# Patient Record
Sex: MALE | Race: BLACK OR AFRICAN AMERICAN | NOT HISPANIC OR LATINO
[De-identification: names, ages, dates, MRNs, and addresses within clinical notes are randomized per-mention and may not be internally consistent; named-entity substitution may affect disease eponyms.]

---

## 2017-01-25 PROBLEM — Z00.00 ENCOUNTER FOR PREVENTIVE HEALTH EXAMINATION: Status: ACTIVE | Noted: 2017-01-25

## 2017-03-23 ENCOUNTER — APPOINTMENT (OUTPATIENT)
Dept: OPHTHALMOLOGY | Facility: CLINIC | Age: 77
End: 2017-03-23

## 2017-05-16 ENCOUNTER — INPATIENT (INPATIENT)
Facility: HOSPITAL | Age: 77
LOS: 1 days | Discharge: HOME HEALTH | End: 2017-05-18
Attending: INTERNAL MEDICINE | Admitting: INTERNAL MEDICINE

## 2017-05-16 VITALS
RESPIRATION RATE: 16 BRPM | DIASTOLIC BLOOD PRESSURE: 96 MMHG | OXYGEN SATURATION: 100 % | SYSTOLIC BLOOD PRESSURE: 165 MMHG | TEMPERATURE: 98 F | HEART RATE: 80 BPM

## 2017-05-16 DIAGNOSIS — I50.9 HEART FAILURE, UNSPECIFIED: ICD-10-CM

## 2017-05-16 DIAGNOSIS — Z98.890 OTHER SPECIFIED POSTPROCEDURAL STATES: Chronic | ICD-10-CM

## 2017-05-16 DIAGNOSIS — I25.10 ATHEROSCLEROTIC HEART DISEASE OF NATIVE CORONARY ARTERY WITHOUT ANGINA PECTORIS: ICD-10-CM

## 2017-05-16 DIAGNOSIS — E78.5 HYPERLIPIDEMIA, UNSPECIFIED: ICD-10-CM

## 2017-05-16 DIAGNOSIS — E11.9 TYPE 2 DIABETES MELLITUS WITHOUT COMPLICATIONS: ICD-10-CM

## 2017-05-16 DIAGNOSIS — I48.0 PAROXYSMAL ATRIAL FIBRILLATION: ICD-10-CM

## 2017-05-16 DIAGNOSIS — Z29.9 ENCOUNTER FOR PROPHYLACTIC MEASURES, UNSPECIFIED: ICD-10-CM

## 2017-05-16 DIAGNOSIS — I10 ESSENTIAL (PRIMARY) HYPERTENSION: ICD-10-CM

## 2017-05-16 DIAGNOSIS — D50.9 IRON DEFICIENCY ANEMIA, UNSPECIFIED: ICD-10-CM

## 2017-05-16 DIAGNOSIS — Z95.5 PRESENCE OF CORONARY ANGIOPLASTY IMPLANT AND GRAFT: Chronic | ICD-10-CM

## 2017-05-16 DIAGNOSIS — I50.43 ACUTE ON CHRONIC COMBINED SYSTOLIC (CONGESTIVE) AND DIASTOLIC (CONGESTIVE) HEART FAILURE: ICD-10-CM

## 2017-05-16 LAB
ALBUMIN SERPL ELPH-MCNC: 3.6 G/DL — SIGNIFICANT CHANGE UP (ref 3.3–5)
ALP SERPL-CCNC: 53 U/L — SIGNIFICANT CHANGE UP (ref 40–120)
ALT FLD-CCNC: 21 U/L — SIGNIFICANT CHANGE UP (ref 4–41)
APTT BLD: 56.4 SEC — HIGH (ref 27.5–37.4)
AST SERPL-CCNC: 53 U/L — HIGH (ref 4–40)
BASOPHILS # BLD AUTO: 0.01 K/UL — SIGNIFICANT CHANGE UP (ref 0–0.2)
BASOPHILS NFR BLD AUTO: 0.1 % — SIGNIFICANT CHANGE UP (ref 0–2)
BILIRUB SERPL-MCNC: 0.6 MG/DL — SIGNIFICANT CHANGE UP (ref 0.2–1.2)
BUN SERPL-MCNC: 8 MG/DL — SIGNIFICANT CHANGE UP (ref 7–23)
CALCIUM SERPL-MCNC: 8 MG/DL — LOW (ref 8.4–10.5)
CHLORIDE SERPL-SCNC: 105 MMOL/L — SIGNIFICANT CHANGE UP (ref 98–107)
CK MB BLD-MCNC: 2.63 NG/ML — SIGNIFICANT CHANGE UP (ref 1–6.6)
CK MB BLD-MCNC: 2.76 NG/ML — SIGNIFICANT CHANGE UP (ref 1–6.6)
CK MB BLD-MCNC: SIGNIFICANT CHANGE UP (ref 0–2.5)
CK SERPL-CCNC: 109 U/L — SIGNIFICANT CHANGE UP (ref 30–200)
CK SERPL-CCNC: 74 U/L — SIGNIFICANT CHANGE UP (ref 30–200)
CO2 SERPL-SCNC: 22 MMOL/L — SIGNIFICANT CHANGE UP (ref 22–31)
CREAT SERPL-MCNC: 0.96 MG/DL — SIGNIFICANT CHANGE UP (ref 0.5–1.3)
EOSINOPHIL # BLD AUTO: 0.11 K/UL — SIGNIFICANT CHANGE UP (ref 0–0.5)
EOSINOPHIL NFR BLD AUTO: 1.1 % — SIGNIFICANT CHANGE UP (ref 0–6)
GLUCOSE SERPL-MCNC: 106 MG/DL — HIGH (ref 70–99)
HCT VFR BLD CALC: 37.2 % — LOW (ref 39–50)
HGB BLD-MCNC: 11.6 G/DL — LOW (ref 13–17)
IMM GRANULOCYTES NFR BLD AUTO: 0.2 % — SIGNIFICANT CHANGE UP (ref 0–1.5)
INR BLD: 3.4 — HIGH (ref 0.88–1.17)
LYMPHOCYTES # BLD AUTO: 4.28 K/UL — HIGH (ref 1–3.3)
LYMPHOCYTES # BLD AUTO: 43.9 % — SIGNIFICANT CHANGE UP (ref 13–44)
MCHC RBC-ENTMCNC: 25.3 PG — LOW (ref 27–34)
MCHC RBC-ENTMCNC: 31.2 % — LOW (ref 32–36)
MCV RBC AUTO: 81 FL — SIGNIFICANT CHANGE UP (ref 80–100)
MONOCYTES # BLD AUTO: 0.6 K/UL — SIGNIFICANT CHANGE UP (ref 0–0.9)
MONOCYTES NFR BLD AUTO: 6.2 % — SIGNIFICANT CHANGE UP (ref 2–14)
NEUTROPHILS # BLD AUTO: 4.72 K/UL — SIGNIFICANT CHANGE UP (ref 1.8–7.4)
NEUTROPHILS NFR BLD AUTO: 48.5 % — SIGNIFICANT CHANGE UP (ref 43–77)
NT-PROBNP SERPL-SCNC: 3210 PG/ML — SIGNIFICANT CHANGE UP
PLATELET # BLD AUTO: 325 K/UL — SIGNIFICANT CHANGE UP (ref 150–400)
PMV BLD: 10.9 FL — SIGNIFICANT CHANGE UP (ref 7–13)
POTASSIUM SERPL-MCNC: 5.4 MMOL/L — HIGH (ref 3.5–5.3)
POTASSIUM SERPL-SCNC: 5.4 MMOL/L — HIGH (ref 3.5–5.3)
PROT SERPL-MCNC: 7.7 G/DL — SIGNIFICANT CHANGE UP (ref 6–8.3)
PROTHROM AB SERPL-ACNC: 39 SEC — HIGH (ref 9.8–13.1)
RBC # BLD: 4.59 M/UL — SIGNIFICANT CHANGE UP (ref 4.2–5.8)
RBC # FLD: 16.8 % — HIGH (ref 10.3–14.5)
SODIUM SERPL-SCNC: 141 MMOL/L — SIGNIFICANT CHANGE UP (ref 135–145)
TROPONIN T SERPL-MCNC: < 0.06 NG/ML — SIGNIFICANT CHANGE UP (ref 0–0.06)
TROPONIN T SERPL-MCNC: < 0.06 NG/ML — SIGNIFICANT CHANGE UP (ref 0–0.06)
WBC # BLD: 9.74 K/UL — SIGNIFICANT CHANGE UP (ref 3.8–10.5)
WBC # FLD AUTO: 9.74 K/UL — SIGNIFICANT CHANGE UP (ref 3.8–10.5)

## 2017-05-16 RX ORDER — FUROSEMIDE 40 MG
40 TABLET ORAL ONCE
Qty: 0 | Refills: 0 | Status: COMPLETED | OUTPATIENT
Start: 2017-05-16 | End: 2017-05-16

## 2017-05-16 RX ORDER — LORATADINE 10 MG/1
10 TABLET ORAL DAILY
Qty: 0 | Refills: 0 | Status: DISCONTINUED | OUTPATIENT
Start: 2017-05-16 | End: 2017-05-18

## 2017-05-16 RX ORDER — CARVEDILOL PHOSPHATE 80 MG/1
6.25 CAPSULE, EXTENDED RELEASE ORAL EVERY 12 HOURS
Qty: 0 | Refills: 0 | Status: DISCONTINUED | OUTPATIENT
Start: 2017-05-16 | End: 2017-05-18

## 2017-05-16 RX ORDER — CLOPIDOGREL BISULFATE 75 MG/1
75 TABLET, FILM COATED ORAL DAILY
Qty: 0 | Refills: 0 | Status: DISCONTINUED | OUTPATIENT
Start: 2017-05-16 | End: 2017-05-18

## 2017-05-16 RX ORDER — GLUCAGON INJECTION, SOLUTION 0.5 MG/.1ML
1 INJECTION, SOLUTION SUBCUTANEOUS ONCE
Qty: 0 | Refills: 0 | Status: DISCONTINUED | OUTPATIENT
Start: 2017-05-16 | End: 2017-05-18

## 2017-05-16 RX ORDER — DEXTROSE 50 % IN WATER 50 %
25 SYRINGE (ML) INTRAVENOUS ONCE
Qty: 0 | Refills: 0 | Status: DISCONTINUED | OUTPATIENT
Start: 2017-05-16 | End: 2017-05-18

## 2017-05-16 RX ORDER — FERROUS SULFATE 325(65) MG
325 TABLET ORAL DAILY
Qty: 0 | Refills: 0 | Status: DISCONTINUED | OUTPATIENT
Start: 2017-05-16 | End: 2017-05-18

## 2017-05-16 RX ORDER — INSULIN LISPRO 100/ML
VIAL (ML) SUBCUTANEOUS
Qty: 0 | Refills: 0 | Status: DISCONTINUED | OUTPATIENT
Start: 2017-05-16 | End: 2017-05-18

## 2017-05-16 RX ORDER — CARVEDILOL PHOSPHATE 80 MG/1
6.25 CAPSULE, EXTENDED RELEASE ORAL ONCE
Qty: 0 | Refills: 0 | Status: COMPLETED | OUTPATIENT
Start: 2017-05-16 | End: 2017-05-16

## 2017-05-16 RX ORDER — DEXTROSE 50 % IN WATER 50 %
1 SYRINGE (ML) INTRAVENOUS ONCE
Qty: 0 | Refills: 0 | Status: DISCONTINUED | OUTPATIENT
Start: 2017-05-16 | End: 2017-05-18

## 2017-05-16 RX ORDER — ATORVASTATIN CALCIUM 80 MG/1
80 TABLET, FILM COATED ORAL AT BEDTIME
Qty: 0 | Refills: 0 | Status: DISCONTINUED | OUTPATIENT
Start: 2017-05-16 | End: 2017-05-18

## 2017-05-16 RX ORDER — SODIUM CHLORIDE 9 MG/ML
1000 INJECTION, SOLUTION INTRAVENOUS
Qty: 0 | Refills: 0 | Status: DISCONTINUED | OUTPATIENT
Start: 2017-05-16 | End: 2017-05-18

## 2017-05-16 RX ORDER — DEXTROSE 50 % IN WATER 50 %
12.5 SYRINGE (ML) INTRAVENOUS ONCE
Qty: 0 | Refills: 0 | Status: DISCONTINUED | OUTPATIENT
Start: 2017-05-16 | End: 2017-05-18

## 2017-05-16 RX ORDER — LISINOPRIL 2.5 MG/1
20 TABLET ORAL DAILY
Qty: 0 | Refills: 0 | Status: DISCONTINUED | OUTPATIENT
Start: 2017-05-16 | End: 2017-05-17

## 2017-05-16 RX ORDER — INSULIN LISPRO 100/ML
VIAL (ML) SUBCUTANEOUS AT BEDTIME
Qty: 0 | Refills: 0 | Status: DISCONTINUED | OUTPATIENT
Start: 2017-05-16 | End: 2017-05-18

## 2017-05-16 RX ORDER — ASPIRIN/CALCIUM CARB/MAGNESIUM 324 MG
81 TABLET ORAL DAILY
Qty: 0 | Refills: 0 | Status: DISCONTINUED | OUTPATIENT
Start: 2017-05-16 | End: 2017-05-18

## 2017-05-16 RX ORDER — FUROSEMIDE 40 MG
40 TABLET ORAL EVERY 12 HOURS
Qty: 0 | Refills: 0 | Status: DISCONTINUED | OUTPATIENT
Start: 2017-05-16 | End: 2017-05-18

## 2017-05-16 RX ADMIN — CARVEDILOL PHOSPHATE 6.25 MILLIGRAM(S): 80 CAPSULE, EXTENDED RELEASE ORAL at 21:33

## 2017-05-16 RX ADMIN — Medication 81 MILLIGRAM(S): at 18:18

## 2017-05-16 RX ADMIN — CLOPIDOGREL BISULFATE 75 MILLIGRAM(S): 75 TABLET, FILM COATED ORAL at 18:18

## 2017-05-16 RX ADMIN — Medication 40 MILLIGRAM(S): at 18:18

## 2017-05-16 RX ADMIN — ATORVASTATIN CALCIUM 80 MILLIGRAM(S): 80 TABLET, FILM COATED ORAL at 21:33

## 2017-05-16 RX ADMIN — Medication 325 MILLIGRAM(S): at 18:18

## 2017-05-16 RX ADMIN — CARVEDILOL PHOSPHATE 6.25 MILLIGRAM(S): 80 CAPSULE, EXTENDED RELEASE ORAL at 06:39

## 2017-05-16 RX ADMIN — Medication 40 MILLIGRAM(S): at 06:08

## 2017-05-16 RX ADMIN — LORATADINE 10 MILLIGRAM(S): 10 TABLET ORAL at 18:18

## 2017-05-16 RX ADMIN — LISINOPRIL 20 MILLIGRAM(S): 2.5 TABLET ORAL at 18:18

## 2017-05-16 NOTE — ED PROVIDER NOTE - ATTENDING CONTRIBUTION TO CARE
I was physically present for the E/M service provided. I agree with above history, physical, and plan which I have reviewed and edited where appropriate. I was physically present for the key portions of the service provided.    -NAD, comfortable respirations on NC, no peripheral edema, CXR report pulmonary edema  -No acute ischemia on EKG, troponin WNL x1  -Will need admission for diuresis, Lasix given in ED

## 2017-05-16 NOTE — H&P ADULT. - NEGATIVE GASTROINTESTINAL SYMPTOMS
no vomiting/no flatulence/no diarrhea/no constipation/no change in bowel habits/no abdominal pain/no melena/no hematochezia/no nausea

## 2017-05-16 NOTE — ED ADULT TRIAGE NOTE - CHIEF COMPLAINT QUOTE
Pt arrives w/ c/o shortness of breath since yesterday worsening since last night. Denies chest pain, nausea or vomiting. Pt arrives w/ c/o shortness of breath since yesterday worsening since last night. Denies chest pain, nausea or vomiting. EKG evaluated by attending and abnormal.

## 2017-05-16 NOTE — H&P ADULT. - ATTENDING COMMENTS
Pt seen and examined at bedside. Agree with assessment and plan as above  Admitted with acute on chronic systolic heart failure  IV diuresis  Tele   Trend CE/Trops  Home meds

## 2017-05-16 NOTE — ED PROVIDER NOTE - PROGRESS NOTE DETAILS
Pt stable. VSS. Labs, imaging reviewed. Pt's cardiologist does not come here, will admit to tele doc. Dr. Young and Tele PA aware.  Robert Finley MD PGY-3

## 2017-05-16 NOTE — H&P ADULT. - PROBLEM SELECTOR PLAN 1
Admit to telemetry, serial cardiac enzymes, serial EKGs  Check CBC, CMP, TSH, FLP, HgA1C, BNP  Continue Coreg, Lisinopril  Start Lasix 40mg IVP BID  Strict I&Os, monitor daily weights, 1500 cc fluid restriction, sodium restriction  Echocardiogram ordered  F/U MD note

## 2017-05-16 NOTE — H&P ADULT. - RS GEN PE MLT RESP DETAILS PC
good air movement/rales/no rhonchi/no intercostal retractions/airway patent/no wheezes/respirations non-labored

## 2017-05-16 NOTE — H&P ADULT. - PMH
CAD (coronary artery disease)    CHF (congestive heart failure)    Chronic diastolic congestive heart failure, NYHA class 3    DM (diabetes mellitus)    HLD (hyperlipidemia)    HTN (hypertension)    KIMMY (iron deficiency anemia)    PAF (paroxysmal atrial fibrillation)

## 2017-05-16 NOTE — H&P ADULT. - OTHER
Echo from April 2017 ([facility]): EF 45%. LVH. Mild decreased LV systolic function. Grade III (severe ) diastolic dysfunction. Moderate MR. Moderate pulm HTN.

## 2017-05-16 NOTE — ED PROVIDER NOTE - PMH
CAD (coronary artery disease)    CHF (congestive heart failure)    Diabetes    HLD (hyperlipidemia)    HTN (hypertension)

## 2017-05-16 NOTE — ED PROVIDER NOTE - OBJECTIVE STATEMENT
75yo male h/o DM, asthma, Afib on coumadin, CAD s/p PCI in 4/2017 [facility] on asa and plavix, CHF EF 45% on lasix, p/w increasing SOB since last night. Pt states he ran out of lasix 3 days ago and missed 3 days doses. + cough with blood tinged sputum, No chest pain, no fevers, no LE edema, no leg pain. no h/o dvt/pe  SOB worse with exertion and talking, cannot complete sentence w/o SOB  meds: asa, plavix, warfarin, lasix, coreg, lisinopril, amiodarone, 77yo male h/o DM, asthma, Afib on coumadin, CAD s/p PCI in 4/2017 [facility] on asa and plavix, CHF EF 45% on lasix, p/w increasing SOB since last night. Pt states he ran out of lasix 3 days ago and missed 3 days doses. Called PCP for refill but reports pharmacy did not receive it. + cough with blood tinged sputum, No chest pain, no fevers, no LE edema, no leg pain. no h/o dvt/pe  SOB worse with exertion and talking, cannot complete sentence w/o SOB  meds: asa, plavix, warfarin, lasix, coreg, lisinopril, amiodarone,

## 2017-05-16 NOTE — H&P ADULT. - NEGATIVE OPHTHALMOLOGIC SYMPTOMS
no loss of vision L/no blurred vision L/no blurred vision R/no diplopia/no pain L/no photophobia/no pain R/no loss of vision R

## 2017-05-16 NOTE — H&P ADULT. - PROBLEM SELECTOR PLAN 3
Monitor on telemetry, serial cardiac enzymes, serial EKGs  No active signs of ischemia  Continue ASA, Plavix, Lipitor, Lisinopril, Coreg  DASH diet

## 2017-05-16 NOTE — H&P ADULT. - ASSESSMENT
75 y/o male with a PMHx of CAD S/P stent placement to mRCA and OM1 (at [facility] in April 2017), ischemic cardiomyopathy with mild LV dysfunction (EF 45%), grade III diastolic dysfunction, HTN, HLD, DM, and KIMMY presents to ED with dyspnea on exertion, orthopnea and weight gain secondary to acute on chronic combined systolic and diastolic heart failure in the setting of missed Lasix doses.

## 2017-05-16 NOTE — H&P ADULT. - NEUROLOGICAL DETAILS
responds to verbal commands/normal strength/alert and oriented x 3/responds to pain/sensation intact/cranial nerves intact

## 2017-05-16 NOTE — H&P ADULT. - NEGATIVE NEUROLOGICAL SYMPTOMS
no vertigo/no focal seizures/no transient paralysis/no weakness/no difficulty walking/no paresthesias/no confusion/no loss of consciousness/no hemiparesis/no tremors/no loss of sensation/no syncope/no generalized seizures/no headache

## 2017-05-16 NOTE — ED ADULT NURSE NOTE - CHIEF COMPLAINT QUOTE
Pt arrives w/ c/o shortness of breath since yesterday worsening since last night. Denies chest pain, nausea or vomiting. EKG evaluated by attending and abnormal.

## 2017-05-16 NOTE — ED ADULT NURSE NOTE - OBJECTIVE STATEMENT
Pt received in #15, aaox3 with c/o sob and cough. Pt states that he missed his doses of lasix for the past 3 days, "I went to the pharmacy for my medicine that my doctor said he sent but they said they didn't get it". Pt denies cp, nausea, vomiting, dizziness or diaphoresis. Pt on CM in SR, IV established, labs sent.

## 2017-05-17 LAB
BUN SERPL-MCNC: 10 MG/DL — SIGNIFICANT CHANGE UP (ref 7–23)
CALCIUM SERPL-MCNC: 9.4 MG/DL — SIGNIFICANT CHANGE UP (ref 8.4–10.5)
CHLORIDE SERPL-SCNC: 104 MMOL/L — SIGNIFICANT CHANGE UP (ref 98–107)
CHOLEST SERPL-MCNC: 118 MG/DL — LOW (ref 120–199)
CO2 SERPL-SCNC: 26 MMOL/L — SIGNIFICANT CHANGE UP (ref 22–31)
CREAT SERPL-MCNC: 0.92 MG/DL — SIGNIFICANT CHANGE UP (ref 0.5–1.3)
GLUCOSE SERPL-MCNC: 109 MG/DL — HIGH (ref 70–99)
HBA1C BLD-MCNC: 6.2 % — HIGH (ref 4–5.6)
HCT VFR BLD CALC: 37.8 % — LOW (ref 39–50)
HDLC SERPL-MCNC: 37 MG/DL — SIGNIFICANT CHANGE UP (ref 35–55)
HGB BLD-MCNC: 11.8 G/DL — LOW (ref 13–17)
INR BLD: 2.93 — HIGH (ref 0.88–1.17)
LIPID PNL WITH DIRECT LDL SERPL: 70 MG/DL — SIGNIFICANT CHANGE UP
MAGNESIUM SERPL-MCNC: 1.8 MG/DL — SIGNIFICANT CHANGE UP (ref 1.6–2.6)
MCHC RBC-ENTMCNC: 25.1 PG — LOW (ref 27–34)
MCHC RBC-ENTMCNC: 31.2 % — LOW (ref 32–36)
MCV RBC AUTO: 80.4 FL — SIGNIFICANT CHANGE UP (ref 80–100)
PLATELET # BLD AUTO: 300 K/UL — SIGNIFICANT CHANGE UP (ref 150–400)
PMV BLD: 10.4 FL — SIGNIFICANT CHANGE UP (ref 7–13)
POTASSIUM SERPL-MCNC: 3.5 MMOL/L — SIGNIFICANT CHANGE UP (ref 3.5–5.3)
POTASSIUM SERPL-SCNC: 3.5 MMOL/L — SIGNIFICANT CHANGE UP (ref 3.5–5.3)
PROTHROM AB SERPL-ACNC: 33.6 SEC — HIGH (ref 9.8–13.1)
RBC # BLD: 4.7 M/UL — SIGNIFICANT CHANGE UP (ref 4.2–5.8)
RBC # FLD: 16.6 % — HIGH (ref 10.3–14.5)
SODIUM SERPL-SCNC: 146 MMOL/L — HIGH (ref 135–145)
TRIGL SERPL-MCNC: 68 MG/DL — SIGNIFICANT CHANGE UP (ref 10–149)
TSH SERPL-MCNC: 1.25 UIU/ML — SIGNIFICANT CHANGE UP (ref 0.27–4.2)
WBC # BLD: 8.51 K/UL — SIGNIFICANT CHANGE UP (ref 3.8–10.5)
WBC # FLD AUTO: 8.51 K/UL — SIGNIFICANT CHANGE UP (ref 3.8–10.5)

## 2017-05-17 RX ORDER — LISINOPRIL 2.5 MG/1
40 TABLET ORAL DAILY
Qty: 0 | Refills: 0 | Status: DISCONTINUED | OUTPATIENT
Start: 2017-05-17 | End: 2017-05-18

## 2017-05-17 RX ORDER — POTASSIUM CHLORIDE 20 MEQ
20 PACKET (EA) ORAL ONCE
Qty: 0 | Refills: 0 | Status: COMPLETED | OUTPATIENT
Start: 2017-05-17 | End: 2017-05-17

## 2017-05-17 RX ORDER — LISINOPRIL 2.5 MG/1
40 TABLET ORAL DAILY
Qty: 0 | Refills: 0 | Status: DISCONTINUED | OUTPATIENT
Start: 2017-05-17 | End: 2017-05-17

## 2017-05-17 RX ORDER — WARFARIN SODIUM 2.5 MG/1
3 TABLET ORAL ONCE
Qty: 0 | Refills: 0 | Status: COMPLETED | OUTPATIENT
Start: 2017-05-17 | End: 2017-05-17

## 2017-05-17 RX ADMIN — Medication 40 MILLIGRAM(S): at 05:43

## 2017-05-17 RX ADMIN — Medication 20 MILLIEQUIVALENT(S): at 12:08

## 2017-05-17 RX ADMIN — LISINOPRIL 20 MILLIGRAM(S): 2.5 TABLET ORAL at 05:43

## 2017-05-17 RX ADMIN — Medication 40 MILLIGRAM(S): at 17:34

## 2017-05-17 RX ADMIN — CLOPIDOGREL BISULFATE 75 MILLIGRAM(S): 75 TABLET, FILM COATED ORAL at 13:56

## 2017-05-17 RX ADMIN — Medication 325 MILLIGRAM(S): at 12:08

## 2017-05-17 RX ADMIN — CARVEDILOL PHOSPHATE 6.25 MILLIGRAM(S): 80 CAPSULE, EXTENDED RELEASE ORAL at 17:34

## 2017-05-17 RX ADMIN — CARVEDILOL PHOSPHATE 6.25 MILLIGRAM(S): 80 CAPSULE, EXTENDED RELEASE ORAL at 05:43

## 2017-05-17 RX ADMIN — ATORVASTATIN CALCIUM 80 MILLIGRAM(S): 80 TABLET, FILM COATED ORAL at 21:32

## 2017-05-17 RX ADMIN — WARFARIN SODIUM 3 MILLIGRAM(S): 2.5 TABLET ORAL at 17:34

## 2017-05-17 RX ADMIN — LISINOPRIL 40 MILLIGRAM(S): 2.5 TABLET ORAL at 13:56

## 2017-05-17 RX ADMIN — LORATADINE 10 MILLIGRAM(S): 10 TABLET ORAL at 12:08

## 2017-05-17 RX ADMIN — Medication 81 MILLIGRAM(S): at 12:08

## 2017-05-18 VITALS
DIASTOLIC BLOOD PRESSURE: 94 MMHG | RESPIRATION RATE: 18 BRPM | TEMPERATURE: 98 F | OXYGEN SATURATION: 100 % | SYSTOLIC BLOOD PRESSURE: 133 MMHG | HEART RATE: 74 BPM

## 2017-05-18 LAB
BASOPHILS # BLD AUTO: 0.01 K/UL — SIGNIFICANT CHANGE UP (ref 0–0.2)
BASOPHILS NFR BLD AUTO: 0.1 % — SIGNIFICANT CHANGE UP (ref 0–2)
BUN SERPL-MCNC: 14 MG/DL — SIGNIFICANT CHANGE UP (ref 7–23)
CALCIUM SERPL-MCNC: 9.3 MG/DL — SIGNIFICANT CHANGE UP (ref 8.4–10.5)
CHLORIDE SERPL-SCNC: 104 MMOL/L — SIGNIFICANT CHANGE UP (ref 98–107)
CO2 SERPL-SCNC: 27 MMOL/L — SIGNIFICANT CHANGE UP (ref 22–31)
CREAT SERPL-MCNC: 0.93 MG/DL — SIGNIFICANT CHANGE UP (ref 0.5–1.3)
EOSINOPHIL # BLD AUTO: 0.11 K/UL — SIGNIFICANT CHANGE UP (ref 0–0.5)
EOSINOPHIL NFR BLD AUTO: 1.5 % — SIGNIFICANT CHANGE UP (ref 0–6)
GLUCOSE SERPL-MCNC: 105 MG/DL — HIGH (ref 70–99)
HCT VFR BLD CALC: 38.3 % — LOW (ref 39–50)
HGB BLD-MCNC: 11.9 G/DL — LOW (ref 13–17)
IMM GRANULOCYTES NFR BLD AUTO: 0.1 % — SIGNIFICANT CHANGE UP (ref 0–1.5)
INR BLD: 2.04 — HIGH (ref 0.88–1.17)
LYMPHOCYTES # BLD AUTO: 3.16 K/UL — SIGNIFICANT CHANGE UP (ref 1–3.3)
LYMPHOCYTES # BLD AUTO: 42.2 % — SIGNIFICANT CHANGE UP (ref 13–44)
MAGNESIUM SERPL-MCNC: 1.7 MG/DL — SIGNIFICANT CHANGE UP (ref 1.6–2.6)
MCHC RBC-ENTMCNC: 24.9 PG — LOW (ref 27–34)
MCHC RBC-ENTMCNC: 31.1 % — LOW (ref 32–36)
MCV RBC AUTO: 80.3 FL — SIGNIFICANT CHANGE UP (ref 80–100)
MONOCYTES # BLD AUTO: 0.7 K/UL — SIGNIFICANT CHANGE UP (ref 0–0.9)
MONOCYTES NFR BLD AUTO: 9.3 % — SIGNIFICANT CHANGE UP (ref 2–14)
NEUTROPHILS # BLD AUTO: 3.5 K/UL — SIGNIFICANT CHANGE UP (ref 1.8–7.4)
NEUTROPHILS NFR BLD AUTO: 46.8 % — SIGNIFICANT CHANGE UP (ref 43–77)
PLATELET # BLD AUTO: 295 K/UL — SIGNIFICANT CHANGE UP (ref 150–400)
PMV BLD: 10.6 FL — SIGNIFICANT CHANGE UP (ref 7–13)
POTASSIUM SERPL-MCNC: 3.1 MMOL/L — LOW (ref 3.5–5.3)
POTASSIUM SERPL-SCNC: 3.1 MMOL/L — LOW (ref 3.5–5.3)
PROTHROM AB SERPL-ACNC: 23.2 SEC — HIGH (ref 9.8–13.1)
RBC # BLD: 4.77 M/UL — SIGNIFICANT CHANGE UP (ref 4.2–5.8)
RBC # FLD: 16.7 % — HIGH (ref 10.3–14.5)
SODIUM SERPL-SCNC: 143 MMOL/L — SIGNIFICANT CHANGE UP (ref 135–145)
WBC # BLD: 7.49 K/UL — SIGNIFICANT CHANGE UP (ref 3.8–10.5)
WBC # FLD AUTO: 7.49 K/UL — SIGNIFICANT CHANGE UP (ref 3.8–10.5)

## 2017-05-18 RX ORDER — CARVEDILOL PHOSPHATE 80 MG/1
1 CAPSULE, EXTENDED RELEASE ORAL
Qty: 60 | Refills: 0 | OUTPATIENT
Start: 2017-05-18

## 2017-05-18 RX ORDER — WARFARIN SODIUM 2.5 MG/1
1 TABLET ORAL
Qty: 0 | Refills: 0 | COMMUNITY

## 2017-05-18 RX ORDER — LORATADINE 10 MG/1
1 TABLET ORAL
Qty: 0 | Refills: 0 | COMMUNITY

## 2017-05-18 RX ORDER — ASPIRIN/CALCIUM CARB/MAGNESIUM 324 MG
1 TABLET ORAL
Qty: 0 | Refills: 0 | COMMUNITY

## 2017-05-18 RX ORDER — CLOPIDOGREL BISULFATE 75 MG/1
1 TABLET, FILM COATED ORAL
Qty: 30 | Refills: 0 | OUTPATIENT
Start: 2017-05-18

## 2017-05-18 RX ORDER — ATORVASTATIN CALCIUM 80 MG/1
1 TABLET, FILM COATED ORAL
Qty: 30 | Refills: 0
Start: 2017-05-18

## 2017-05-18 RX ORDER — WARFARIN SODIUM 2.5 MG/1
1 TABLET ORAL
Qty: 7 | Refills: 0 | OUTPATIENT
Start: 2017-05-18 | End: 2017-06-17

## 2017-05-18 RX ORDER — ASPIRIN/CALCIUM CARB/MAGNESIUM 324 MG
1 TABLET ORAL
Qty: 30 | Refills: 0 | OUTPATIENT
Start: 2017-05-18

## 2017-05-18 RX ORDER — LINAGLIPTIN 5 MG/1
1 TABLET, FILM COATED ORAL
Qty: 0 | Refills: 0 | COMMUNITY

## 2017-05-18 RX ORDER — FUROSEMIDE 40 MG
40 TABLET ORAL
Qty: 30 | Refills: 0 | OUTPATIENT
Start: 2017-05-18

## 2017-05-18 RX ORDER — POTASSIUM CHLORIDE 20 MEQ
40 PACKET (EA) ORAL EVERY 4 HOURS
Qty: 0 | Refills: 0 | Status: DISCONTINUED | OUTPATIENT
Start: 2017-05-18 | End: 2017-05-18

## 2017-05-18 RX ORDER — LISINOPRIL 2.5 MG/1
1 TABLET ORAL
Qty: 0 | Refills: 0 | COMMUNITY
Start: 2017-05-18

## 2017-05-18 RX ORDER — FERROUS SULFATE 325(65) MG
1 TABLET ORAL
Qty: 30 | Refills: 0 | OUTPATIENT
Start: 2017-05-18

## 2017-05-18 RX ORDER — FUROSEMIDE 40 MG
0 TABLET ORAL
Qty: 0 | Refills: 0 | COMMUNITY

## 2017-05-18 RX ORDER — CLOPIDOGREL BISULFATE 75 MG/1
1 TABLET, FILM COATED ORAL
Qty: 0 | Refills: 0 | COMMUNITY

## 2017-05-18 RX ORDER — CARVEDILOL PHOSPHATE 80 MG/1
1 CAPSULE, EXTENDED RELEASE ORAL
Qty: 0 | Refills: 0 | COMMUNITY

## 2017-05-18 RX ORDER — LINAGLIPTIN 5 MG/1
1 TABLET, FILM COATED ORAL
Qty: 30 | Refills: 0
Start: 2017-05-18

## 2017-05-18 RX ORDER — LISINOPRIL 2.5 MG/1
1 TABLET ORAL
Qty: 0 | Refills: 0 | COMMUNITY

## 2017-05-18 RX ORDER — WARFARIN SODIUM 2.5 MG/1
1 TABLET ORAL
Qty: 7 | Refills: 0 | OUTPATIENT
Start: 2017-05-18 | End: 2017-05-25

## 2017-05-18 RX ORDER — WARFARIN SODIUM 2.5 MG/1
5 TABLET ORAL ONCE
Qty: 0 | Refills: 0 | Status: DISCONTINUED | OUTPATIENT
Start: 2017-05-18 | End: 2017-05-18

## 2017-05-18 RX ORDER — FERROUS SULFATE 325(65) MG
1 TABLET ORAL
Qty: 0 | Refills: 0 | COMMUNITY

## 2017-05-18 RX ORDER — MAGNESIUM OXIDE 400 MG ORAL TABLET 241.3 MG
400 TABLET ORAL
Qty: 0 | Refills: 0 | Status: DISCONTINUED | OUTPATIENT
Start: 2017-05-18 | End: 2017-05-18

## 2017-05-18 RX ORDER — LISINOPRIL 2.5 MG/1
1 TABLET ORAL
Qty: 30 | Refills: 0 | OUTPATIENT
Start: 2017-05-18

## 2017-05-18 RX ORDER — ATORVASTATIN CALCIUM 80 MG/1
1 TABLET, FILM COATED ORAL
Qty: 0 | Refills: 0 | COMMUNITY

## 2017-05-18 RX ADMIN — LORATADINE 10 MILLIGRAM(S): 10 TABLET ORAL at 11:29

## 2017-05-18 RX ADMIN — Medication 325 MILLIGRAM(S): at 11:28

## 2017-05-18 RX ADMIN — CARVEDILOL PHOSPHATE 6.25 MILLIGRAM(S): 80 CAPSULE, EXTENDED RELEASE ORAL at 05:44

## 2017-05-18 RX ADMIN — Medication 81 MILLIGRAM(S): at 11:28

## 2017-05-18 RX ADMIN — Medication 40 MILLIEQUIVALENT(S): at 14:54

## 2017-05-18 RX ADMIN — Medication 1: at 11:29

## 2017-05-18 RX ADMIN — CLOPIDOGREL BISULFATE 75 MILLIGRAM(S): 75 TABLET, FILM COATED ORAL at 11:28

## 2017-05-18 RX ADMIN — LISINOPRIL 40 MILLIGRAM(S): 2.5 TABLET ORAL at 05:44

## 2017-05-18 RX ADMIN — Medication 40 MILLIGRAM(S): at 05:44

## 2017-05-18 NOTE — DISCHARGE NOTE ADULT - CARE PLAN
Principal Discharge DX:	Acute on chronic combined systolic (congestive) and diastolic (congestive) heart failure  Goal:	to prevent future episodes of exacerbation  Instructions for follow-up, activity and diet:	pt will continue Lasix and other medications as prescribed, pt will follow up with PCP in  week;  --< follow up with cardiology in 1 week  Secondary Diagnosis:	Type 2 diabetes mellitus without complication, without long-term current use of insulin  Goal:	continue current management  Instructions for follow-up, activity and diet:	ADA approved diabetic diet  Secondary Diagnosis:	Essential hypertension  Goal:	continue current management  Secondary Diagnosis:	PAF (paroxysmal atrial fibrillation)  Goal:	continue current medications,  Instructions for follow-up, activity and diet:	follow up with PCP within  week to check blood level/INR  Secondary Diagnosis:	Coronary artery disease involving native coronary artery of native heart without angina pectoris  Goal:	continue current medications, follow up with cardiology and PCP in  week

## 2017-05-18 NOTE — DISCHARGE NOTE ADULT - PATIENT PORTAL LINK FT
“You can access the FollowHealth Patient Portal, offered by [facility], by registering with the following website: [email or web address]”

## 2017-05-18 NOTE — DISCHARGE NOTE ADULT - MEDICATION SUMMARY - MEDICATIONS TO TAKE
I will START or STAY ON the medications listed below when I get home from the hospital:    aspirin 81 mg oral delayed release tablet  -- 1 tab(s) by mouth once a day  -- Indication: For Cardioprotective agent     lisinopril 40 mg oral tablet  -- 1 tab(s) by mouth once a day  -- Indication: For Hypertension, heart failure     warfarin 4 mg oral tablet  -- 1 tab(s) by mouth once a day  -- Indication: For Atrial fibrillation    Tradjenta 5 mg oral tablet  -- 1 tab(s) by mouth once a day  -- Indication: For Diabetes mellitus     atorvastatin 80 mg oral tablet  -- 1 tab(s) by mouth once a day (at bedtime)  -- Indication: For HLD (hyperlipidemia)    clopidogrel 75 mg oral tablet  -- 1 tab(s) by mouth once a day  -- Indication: For CAD (coronary artery disease)    carvedilol 6.25 mg oral tablet  -- 1 tab(s) by mouth every 12 hours  -- Indication: For CAD (coronary artery disease)    Lasix 40 mg oral tablet  -- 40 tab(s) by mouth once a day  -- Indication: For CHF (congestive heart failure)    ferrous sulfate 325 mg (65 mg elemental iron) oral delayed release tablet  -- 1 tab(s) by mouth once a day  -- Indication: For Iron supplement

## 2017-05-18 NOTE — DISCHARGE NOTE ADULT - MEDICATION SUMMARY - MEDICATIONS TO CHANGE
I will SWITCH the dose or number of times a day I take the medications listed below when I get home from the hospital:    Coumadin 5 mg oral tablet  -- 1 tab(s) by mouth once a day

## 2017-05-18 NOTE — DISCHARGE NOTE ADULT - PROVIDER TOKENS
FREE:[LAST:[Román Grossman],FIRST:[-- PCP],PHONE:[(   )    -],FAX:[(   )    -]],TOKEN:'[id number]'

## 2017-05-18 NOTE — DISCHARGE NOTE ADULT - SECONDARY DIAGNOSIS.
Type 2 diabetes mellitus without complication, without long-term current use of insulin Essential hypertension PAF (paroxysmal atrial fibrillation) Coronary artery disease involving native coronary artery of native heart without angina pectoris

## 2017-05-18 NOTE — DISCHARGE NOTE ADULT - NS AS DC HF EDUCATION INSTRUCTIONS
Call Primary Care Provider for follow-up after discharge/Monitor Weight Daily/Low salt diet/Report weight gain of 2 or more pounds in one day or 3 or more pounds in one week, worsening shortness of breath, fatigue, weakness, increased swelling of hands and feet to primary care provider/Activities as tolerated

## 2017-05-18 NOTE — DISCHARGE NOTE ADULT - HOSPITAL COURSE
75 y/o male with a PMHx of CAD S/P stent placement to mRCA and OM1 (at [facility] in April 2017), ischemic cardiomyopathy with mild LV dysfunction (EF 45%), grade III diastolic dysfunction, HTN, HLD, DM, and KIMMY presents to ED with dyspnea on exertion and orthopnea for the past 4 days. Pt went to his PCP on Friday 5/12 for a routine follow up and asked for a prescription refill on his Lasix. Pt reports that his PCP reportedly forgot to send a prescription to his pharmacy so he has been unable to take his Lasix since then. Since Saturday 5/13, pt now complains of dyspnea on minimal exertion, two pillow orthopnea, and three pound weight gain. Pt also admits to cough productive of red, frothy sputum. Pt says he feels congested. Pt does report sticking to a low salt and fluid restricted diet. Pt denies fever, chills, headache, dizziness, visual deficits, chest pain, pleuritic chest pain, palpitations, abdominal pain, N/V/D/C, hematochezia, melena, dysuria, hematuria, LOC, syncope, peripheral edema, weight gain/loss. Upon arrival to ED, EKG: NSR at 75 bpm, LAD, LAE. CE x1: Negative. CXR: Bilateral airspace opacities with small right pleural effusion but no cardiomegaly. Questionable pulmonary edema. H/H: 11.6/37.2. Glucose: 106. AST: 53. Pro BNP: 3210. Pt was given Lasix 40mg IVP and admitted to telemetry.

## 2017-05-18 NOTE — DISCHARGE NOTE ADULT - OTHER SIGNIFICANT FINDINGS
Echo from April 2017 ([facility]): EF 45%. LVH. Mild decreased LV systolic function. Grade III (severe ) diastolic dysfunction. Moderate MR. Moderate pulm HTN.   ----------  EKG: NSR at 75 bpm, LAD, LAE  CE x1: Negative  CXR: Bilateral airspace opacities with small right pleural effusion but no cardiomegaly. Questionable pulmonary edema.   H/H: 11.6/37.2  Glucose: 106  AST: 53  ProBNP: 3210  ECHO-- will be done outpatient, last ECHO was done in April 2017 in [facility].

## 2017-05-18 NOTE — DISCHARGE NOTE ADULT - MEDICATION SUMMARY - MEDICATIONS TO STOP TAKING
I will STOP taking the medications listed below when I get home from the hospital:    loratadine 10 mg oral capsule  -- 1 cap(s) by mouth once a day

## 2017-05-18 NOTE — DISCHARGE NOTE ADULT - CARE PROVIDER_API CALL
Román Grossman, -- PCP  Phone: (   )    -  Fax: (   )    -    Jerad Jesus (MARTHA), Cardiovascular Disease; Internal Medicine; Nuclear Cardiology  [address] [place] [address]  Phone: [phone]  Fax: [phone]

## 2017-05-18 NOTE — DISCHARGE NOTE ADULT - PLAN OF CARE
to prevent future episodes of exacerbation pt will continue Lasix and other medications as prescribed, pt will follow up with PCP in  week;  --< follow up with cardiology in 1 week continue current management ADA approved diabetic diet continue current medications, follow up with PCP within  week to check blood level/INR continue current medications, follow up with cardiology and PCP in  week

## 2017-06-03 ENCOUNTER — EMERGENCY (EMERGENCY)
Facility: HOSPITAL | Age: 77
LOS: 1 days | Discharge: HOME | End: 2017-06-03
Attending: EMERGENCY MEDICINE | Admitting: EMERGENCY MEDICINE
Payer: MEDICARE

## 2017-06-03 VITALS
OXYGEN SATURATION: 96 % | SYSTOLIC BLOOD PRESSURE: 176 MMHG | TEMPERATURE: 98 F | RESPIRATION RATE: 24 BRPM | DIASTOLIC BLOOD PRESSURE: 98 MMHG | HEART RATE: 83 BPM

## 2017-06-03 VITALS
OXYGEN SATURATION: 98 % | HEART RATE: 61 BPM | DIASTOLIC BLOOD PRESSURE: 79 MMHG | RESPIRATION RATE: 16 BRPM | SYSTOLIC BLOOD PRESSURE: 137 MMHG | TEMPERATURE: 98 F

## 2017-06-03 DIAGNOSIS — Z98.890 OTHER SPECIFIED POSTPROCEDURAL STATES: Chronic | ICD-10-CM

## 2017-06-03 DIAGNOSIS — Z95.5 PRESENCE OF CORONARY ANGIOPLASTY IMPLANT AND GRAFT: Chronic | ICD-10-CM

## 2017-06-03 PROBLEM — I50.9 HEART FAILURE, UNSPECIFIED: Chronic | Status: ACTIVE | Noted: 2017-05-16

## 2017-06-03 PROBLEM — E78.5 HYPERLIPIDEMIA, UNSPECIFIED: Chronic | Status: ACTIVE | Noted: 2017-05-16

## 2017-06-03 PROBLEM — I10 ESSENTIAL (PRIMARY) HYPERTENSION: Chronic | Status: ACTIVE | Noted: 2017-05-16

## 2017-06-03 PROBLEM — I25.10 ATHEROSCLEROTIC HEART DISEASE OF NATIVE CORONARY ARTERY WITHOUT ANGINA PECTORIS: Chronic | Status: ACTIVE | Noted: 2017-05-16

## 2017-06-03 LAB
ALBUMIN SERPL ELPH-MCNC: 3.6 G/DL — SIGNIFICANT CHANGE UP (ref 3.3–5)
ALP SERPL-CCNC: 63 U/L — SIGNIFICANT CHANGE UP (ref 40–120)
ALT FLD-CCNC: 17 U/L — SIGNIFICANT CHANGE UP (ref 4–41)
AST SERPL-CCNC: 26 U/L — SIGNIFICANT CHANGE UP (ref 4–40)
BASOPHILS # BLD AUTO: 0.01 K/UL — SIGNIFICANT CHANGE UP (ref 0–0.2)
BASOPHILS NFR BLD AUTO: 0.1 % — SIGNIFICANT CHANGE UP (ref 0–2)
BILIRUB SERPL-MCNC: 0.5 MG/DL — SIGNIFICANT CHANGE UP (ref 0.2–1.2)
BUN SERPL-MCNC: 10 MG/DL — SIGNIFICANT CHANGE UP (ref 7–23)
CALCIUM SERPL-MCNC: 9.2 MG/DL — SIGNIFICANT CHANGE UP (ref 8.4–10.5)
CHLORIDE SERPL-SCNC: 107 MMOL/L — SIGNIFICANT CHANGE UP (ref 98–107)
CK MB BLD-MCNC: 2.35 NG/ML — SIGNIFICANT CHANGE UP (ref 1–6.6)
CK SERPL-CCNC: 87 U/L — SIGNIFICANT CHANGE UP (ref 30–200)
CO2 SERPL-SCNC: 22 MMOL/L — SIGNIFICANT CHANGE UP (ref 22–31)
CREAT SERPL-MCNC: 0.94 MG/DL — SIGNIFICANT CHANGE UP (ref 0.5–1.3)
EOSINOPHIL # BLD AUTO: 0.12 K/UL — SIGNIFICANT CHANGE UP (ref 0–0.5)
EOSINOPHIL NFR BLD AUTO: 1.5 % — SIGNIFICANT CHANGE UP (ref 0–6)
GLUCOSE SERPL-MCNC: 124 MG/DL — HIGH (ref 70–99)
HCT VFR BLD CALC: 38.9 % — LOW (ref 39–50)
HGB BLD-MCNC: 12 G/DL — LOW (ref 13–17)
IMM GRANULOCYTES NFR BLD AUTO: 0.2 % — SIGNIFICANT CHANGE UP (ref 0–1.5)
LYMPHOCYTES # BLD AUTO: 2.66 K/UL — SIGNIFICANT CHANGE UP (ref 1–3.3)
LYMPHOCYTES # BLD AUTO: 32.3 % — SIGNIFICANT CHANGE UP (ref 13–44)
MCHC RBC-ENTMCNC: 25.1 PG — LOW (ref 27–34)
MCHC RBC-ENTMCNC: 30.8 % — LOW (ref 32–36)
MCV RBC AUTO: 81.4 FL — SIGNIFICANT CHANGE UP (ref 80–100)
MONOCYTES # BLD AUTO: 0.52 K/UL — SIGNIFICANT CHANGE UP (ref 0–0.9)
MONOCYTES NFR BLD AUTO: 6.3 % — SIGNIFICANT CHANGE UP (ref 2–14)
NEUTROPHILS # BLD AUTO: 4.91 K/UL — SIGNIFICANT CHANGE UP (ref 1.8–7.4)
NEUTROPHILS NFR BLD AUTO: 59.6 % — SIGNIFICANT CHANGE UP (ref 43–77)
NT-PROBNP SERPL-SCNC: 2273 PG/ML — SIGNIFICANT CHANGE UP
PLATELET # BLD AUTO: 261 K/UL — SIGNIFICANT CHANGE UP (ref 150–400)
PMV BLD: 11.6 FL — SIGNIFICANT CHANGE UP (ref 7–13)
POTASSIUM SERPL-MCNC: 4 MMOL/L — SIGNIFICANT CHANGE UP (ref 3.5–5.3)
POTASSIUM SERPL-SCNC: 4 MMOL/L — SIGNIFICANT CHANGE UP (ref 3.5–5.3)
PROT SERPL-MCNC: 7.5 G/DL — SIGNIFICANT CHANGE UP (ref 6–8.3)
RBC # BLD: 4.78 M/UL — SIGNIFICANT CHANGE UP (ref 4.2–5.8)
RBC # FLD: 15.8 % — HIGH (ref 10.3–14.5)
SODIUM SERPL-SCNC: 145 MMOL/L — SIGNIFICANT CHANGE UP (ref 135–145)
TROPONIN T SERPL-MCNC: < 0.06 NG/ML — SIGNIFICANT CHANGE UP (ref 0–0.06)
WBC # BLD: 8.24 K/UL — SIGNIFICANT CHANGE UP (ref 3.8–10.5)
WBC # FLD AUTO: 8.24 K/UL — SIGNIFICANT CHANGE UP (ref 3.8–10.5)

## 2017-06-03 PROCEDURE — 99053 MED SERV 10PM-8AM 24 HR FAC: CPT

## 2017-06-03 PROCEDURE — 71010: CPT | Mod: 26

## 2017-06-03 PROCEDURE — 99285 EMERGENCY DEPT VISIT HI MDM: CPT | Mod: 25

## 2017-06-03 RX ORDER — FUROSEMIDE 40 MG
40 TABLET ORAL ONCE
Qty: 0 | Refills: 0 | Status: COMPLETED | OUTPATIENT
Start: 2017-06-03 | End: 2017-06-03

## 2017-06-03 RX ORDER — NITROGLYCERIN 6.5 MG
0.4 CAPSULE, EXTENDED RELEASE ORAL ONCE
Qty: 0 | Refills: 0 | Status: COMPLETED | OUTPATIENT
Start: 2017-06-03 | End: 2017-06-03

## 2017-06-03 RX ORDER — NITROGLYCERIN 6.5 MG
0.3 CAPSULE, EXTENDED RELEASE ORAL ONCE
Qty: 0 | Refills: 0 | Status: DISCONTINUED | OUTPATIENT
Start: 2017-06-03 | End: 2017-06-03

## 2017-06-03 RX ADMIN — Medication 40 MILLIGRAM(S): at 06:21

## 2017-06-03 RX ADMIN — Medication 0.4 MILLIGRAM(S): at 06:21

## 2017-06-03 NOTE — ED ADULT TRIAGE NOTE - CHIEF COMPLAINT QUOTE
Pt st" I can't breath started last night 2am I began coughing and bringing up pink sputum....getting worse...I was just here 2 weeks ago for the same...I have heart failure." Hx of Afib, COPD, HTN, DM

## 2017-06-03 NOTE — ED PROVIDER NOTE - OBJECTIVE STATEMENT
76M hx of CAD s/p stents ([facility] April 2017), CHF, HTN, DM and HLD presents to ED with LOBO and orthopnea that started this morning. Pt was admitted to hospital for similar sxs 2 weeks ago. Pt has been coughing for past 5 days, productive clear sputum with specs of blood. No chest pain, back pain or abdominal pain. No d/c/n/v/f or chills. No calf swelling, no hx of blood clots, no recent surgery. 76M hx of CAD s/p stents ([facility] April 2017), CHF, HTN, DM and HLD presents to ED with LOBO and orthopnea that started this morning. Pt was admitted to hospital for similar sxs 2 weeks ago. Pt has been coughing for past 5 days, productive clear sputum with specs of blood. No chest pain, back pain or abdominal pain. No d/c/n/v/f or chills. No calf swelling, no hx of blood clots, no recent surgery.    Cardiologist - Dr. Jesus [phone]

## 2017-06-03 NOTE — ED ADULT NURSE NOTE - OBJECTIVE STATEMENT
Pt received A&Ox3, NAD to ED Tr B with c/o intermit SOB & productive cough x few weeks worsening today. States blood tinged sputum with bad coughing spell yesterday. States hx of CHF, COPD. RR equal & unlabored. Labs sent. Spouse at bedside. Report to primary RN.

## 2017-06-03 NOTE — ED PROVIDER NOTE - PROGRESS NOTE DETAILS
Cardiologist called and left msg. Cardiologist was informed of the discharge plan and outpatient follow up. The cardiologist was upset about the call early in the morning and hanged up. Kelvin: Assumed care at sign out. Pt resting comfortably. BNP and CXR improved from prior. Okay for dc. Will follow up with his cardiologist.

## 2017-06-03 NOTE — ED PROVIDER NOTE - ATTENDING CONTRIBUTION TO CARE
agree with resident note   76M hx of CAD s/p stents ([facility] April 2017), CHF, HTN, DM and HLD who presents for leg swelling and mild SOB.  Was recently discharged for similar symptoms.  Denies fever, chest pain    PE: well appearing, VSS, CTAB/L; s1 s2 no m/r/g abd soft/NT/ND exT: 2+ pitting edema

## 2017-06-13 ENCOUNTER — INPATIENT (INPATIENT)
Facility: HOSPITAL | Age: 77
LOS: 2 days | Discharge: HOME HEALTH | End: 2017-06-16
Attending: INTERNAL MEDICINE | Admitting: INTERNAL MEDICINE
Payer: MEDICARE

## 2017-06-13 VITALS
TEMPERATURE: 98 F | SYSTOLIC BLOOD PRESSURE: 182 MMHG | DIASTOLIC BLOOD PRESSURE: 110 MMHG | RESPIRATION RATE: 24 BRPM | OXYGEN SATURATION: 97 % | HEART RATE: 93 BPM

## 2017-06-13 DIAGNOSIS — Z95.5 PRESENCE OF CORONARY ANGIOPLASTY IMPLANT AND GRAFT: Chronic | ICD-10-CM

## 2017-06-13 DIAGNOSIS — I48.0 PAROXYSMAL ATRIAL FIBRILLATION: ICD-10-CM

## 2017-06-13 DIAGNOSIS — Z98.890 OTHER SPECIFIED POSTPROCEDURAL STATES: Chronic | ICD-10-CM

## 2017-06-13 DIAGNOSIS — E11.9 TYPE 2 DIABETES MELLITUS WITHOUT COMPLICATIONS: ICD-10-CM

## 2017-06-13 DIAGNOSIS — I50.43 ACUTE ON CHRONIC COMBINED SYSTOLIC (CONGESTIVE) AND DIASTOLIC (CONGESTIVE) HEART FAILURE: ICD-10-CM

## 2017-06-13 DIAGNOSIS — I25.10 ATHEROSCLEROTIC HEART DISEASE OF NATIVE CORONARY ARTERY WITHOUT ANGINA PECTORIS: ICD-10-CM

## 2017-06-13 LAB
ALBUMIN SERPL ELPH-MCNC: 4.1 G/DL — SIGNIFICANT CHANGE UP (ref 3.3–5)
ALP SERPL-CCNC: 111 U/L — SIGNIFICANT CHANGE UP (ref 40–120)
ALT FLD-CCNC: 55 U/L — HIGH (ref 4–41)
APPEARANCE UR: CLEAR — SIGNIFICANT CHANGE UP
APTT BLD: 47.6 SEC — HIGH (ref 27.5–37.4)
AST SERPL-CCNC: 48 U/L — HIGH (ref 4–40)
BASE EXCESS BLDV CALC-SCNC: -4.5 MMOL/L — SIGNIFICANT CHANGE UP
BASE EXCESS BLDV CALC-SCNC: 1.9 MMOL/L — SIGNIFICANT CHANGE UP
BASOPHILS # BLD AUTO: 0.02 K/UL — SIGNIFICANT CHANGE UP (ref 0–0.2)
BASOPHILS NFR BLD AUTO: 0.1 % — SIGNIFICANT CHANGE UP (ref 0–2)
BILIRUB SERPL-MCNC: 0.4 MG/DL — SIGNIFICANT CHANGE UP (ref 0.2–1.2)
BILIRUB UR-MCNC: NEGATIVE — SIGNIFICANT CHANGE UP
BLOOD GAS VENOUS - CREATININE: 0.79 MG/DL — SIGNIFICANT CHANGE UP (ref 0.5–1.3)
BLOOD GAS VENOUS - CREATININE: 0.93 MG/DL — SIGNIFICANT CHANGE UP (ref 0.5–1.3)
BLOOD UR QL VISUAL: NEGATIVE — SIGNIFICANT CHANGE UP
BUN SERPL-MCNC: 11 MG/DL — SIGNIFICANT CHANGE UP (ref 7–23)
CALCIUM SERPL-MCNC: 8.3 MG/DL — LOW (ref 8.4–10.5)
CHLORIDE BLDV-SCNC: 111 MMOL/L — HIGH (ref 96–108)
CHLORIDE BLDV-SCNC: 114 MMOL/L — HIGH (ref 96–108)
CHLORIDE SERPL-SCNC: 106 MMOL/L — SIGNIFICANT CHANGE UP (ref 98–107)
CK MB BLD-MCNC: 3.09 NG/ML — SIGNIFICANT CHANGE UP (ref 1–6.6)
CK MB BLD-MCNC: SIGNIFICANT CHANGE UP (ref 0–2.5)
CK SERPL-CCNC: 93 U/L — SIGNIFICANT CHANGE UP (ref 30–200)
CK SERPL-CCNC: 95 U/L — SIGNIFICANT CHANGE UP (ref 30–200)
CO2 SERPL-SCNC: 24 MMOL/L — SIGNIFICANT CHANGE UP (ref 22–31)
COLOR SPEC: SIGNIFICANT CHANGE UP
CREAT SERPL-MCNC: 0.88 MG/DL — SIGNIFICANT CHANGE UP (ref 0.5–1.3)
EOSINOPHIL # BLD AUTO: 0.18 K/UL — SIGNIFICANT CHANGE UP (ref 0–0.5)
EOSINOPHIL NFR BLD AUTO: 1.3 % — SIGNIFICANT CHANGE UP (ref 0–6)
GAS PNL BLDV: 139 MMOL/L — SIGNIFICANT CHANGE UP (ref 136–146)
GAS PNL BLDV: 142 MMOL/L — SIGNIFICANT CHANGE UP (ref 136–146)
GLUCOSE BLDV-MCNC: 174 — HIGH (ref 70–99)
GLUCOSE BLDV-MCNC: 98 — SIGNIFICANT CHANGE UP (ref 70–99)
GLUCOSE SERPL-MCNC: 113 MG/DL — HIGH (ref 70–99)
GLUCOSE UR-MCNC: NEGATIVE — SIGNIFICANT CHANGE UP
HCO3 BLDV-SCNC: 17 MMOL/L — LOW (ref 20–27)
HCO3 BLDV-SCNC: 25 MMOL/L — SIGNIFICANT CHANGE UP (ref 20–27)
HCT VFR BLD CALC: 45.4 % — SIGNIFICANT CHANGE UP (ref 39–50)
HCT VFR BLDV CALC: 39.5 % — SIGNIFICANT CHANGE UP (ref 39–51)
HCT VFR BLDV CALC: 44.4 % — SIGNIFICANT CHANGE UP (ref 39–51)
HGB BLD-MCNC: 13.9 G/DL — SIGNIFICANT CHANGE UP (ref 13–17)
HGB BLDV-MCNC: 12.8 G/DL — LOW (ref 13–17)
HGB BLDV-MCNC: 14.5 G/DL — SIGNIFICANT CHANGE UP (ref 13–17)
IMM GRANULOCYTES NFR BLD AUTO: 0.1 % — SIGNIFICANT CHANGE UP (ref 0–1.5)
INR BLD: 1.81 — HIGH (ref 0.88–1.17)
KETONES UR-MCNC: NEGATIVE — SIGNIFICANT CHANGE UP
LACTATE BLDV-MCNC: 1.7 MMOL/L — SIGNIFICANT CHANGE UP (ref 0.5–2)
LACTATE BLDV-MCNC: 3.2 MMOL/L — HIGH (ref 0.5–2)
LEUKOCYTE ESTERASE UR-ACNC: NEGATIVE — SIGNIFICANT CHANGE UP
LYMPHOCYTES # BLD AUTO: 48.7 % — HIGH (ref 13–44)
LYMPHOCYTES # BLD AUTO: 6.82 K/UL — HIGH (ref 1–3.3)
MAGNESIUM SERPL-MCNC: 2.1 MG/DL — SIGNIFICANT CHANGE UP (ref 1.6–2.6)
MCHC RBC-ENTMCNC: 25.4 PG — LOW (ref 27–34)
MCHC RBC-ENTMCNC: 30.6 % — LOW (ref 32–36)
MCV RBC AUTO: 83 FL — SIGNIFICANT CHANGE UP (ref 80–100)
MONOCYTES # BLD AUTO: 0.83 K/UL — SIGNIFICANT CHANGE UP (ref 0–0.9)
MONOCYTES NFR BLD AUTO: 5.9 % — SIGNIFICANT CHANGE UP (ref 2–14)
NEUTROPHILS # BLD AUTO: 6.14 K/UL — SIGNIFICANT CHANGE UP (ref 1.8–7.4)
NEUTROPHILS NFR BLD AUTO: 43.9 % — SIGNIFICANT CHANGE UP (ref 43–77)
NITRITE UR-MCNC: NEGATIVE — SIGNIFICANT CHANGE UP
NT-PROBNP SERPL-SCNC: 2598 PG/ML — SIGNIFICANT CHANGE UP
PCO2 BLDV: 50 MMHG — SIGNIFICANT CHANGE UP (ref 41–51)
PCO2 BLDV: 78 MMHG — HIGH (ref 41–51)
PH BLDV: 7.11 PH — CRITICAL LOW (ref 7.32–7.43)
PH BLDV: 7.35 PH — SIGNIFICANT CHANGE UP (ref 7.32–7.43)
PH UR: 6.5 — SIGNIFICANT CHANGE UP (ref 4.6–8)
PHOSPHATE SERPL-MCNC: 2.2 MG/DL — LOW (ref 2.5–4.5)
PLATELET # BLD AUTO: 266 K/UL — SIGNIFICANT CHANGE UP (ref 150–400)
PMV BLD: 11 FL — SIGNIFICANT CHANGE UP (ref 7–13)
PO2 BLDV: 35 MMHG — SIGNIFICANT CHANGE UP (ref 35–40)
PO2 BLDV: 40 MMHG — SIGNIFICANT CHANGE UP (ref 35–40)
POTASSIUM BLDV-SCNC: 4.3 MMOL/L — SIGNIFICANT CHANGE UP (ref 3.4–4.5)
POTASSIUM BLDV-SCNC: 7.1 MMOL/L — CRITICAL HIGH (ref 3.4–4.5)
POTASSIUM SERPL-MCNC: 3.7 MMOL/L — SIGNIFICANT CHANGE UP (ref 3.5–5.3)
POTASSIUM SERPL-MCNC: 4.1 MMOL/L — SIGNIFICANT CHANGE UP (ref 3.5–5.3)
POTASSIUM SERPL-SCNC: 3.7 MMOL/L — SIGNIFICANT CHANGE UP (ref 3.5–5.3)
POTASSIUM SERPL-SCNC: 4.1 MMOL/L — SIGNIFICANT CHANGE UP (ref 3.5–5.3)
PROT SERPL-MCNC: 8.2 G/DL — SIGNIFICANT CHANGE UP (ref 6–8.3)
PROT UR-MCNC: NEGATIVE — SIGNIFICANT CHANGE UP
PROTHROM AB SERPL-ACNC: 20.5 SEC — HIGH (ref 9.8–13.1)
RBC # BLD: 5.47 M/UL — SIGNIFICANT CHANGE UP (ref 4.2–5.8)
RBC # FLD: 15.8 % — HIGH (ref 10.3–14.5)
RBC CASTS # UR COMP ASSIST: SIGNIFICANT CHANGE UP (ref 0–?)
SAO2 % BLDV: 47.5 % — LOW (ref 60–85)
SAO2 % BLDV: 55 % — LOW (ref 60–85)
SODIUM SERPL-SCNC: 144 MMOL/L — SIGNIFICANT CHANGE UP (ref 135–145)
SP GR SPEC: 1 — SIGNIFICANT CHANGE UP (ref 1–1.03)
SQUAMOUS # UR AUTO: SIGNIFICANT CHANGE UP
TROPONIN T SERPL-MCNC: < 0.06 NG/ML — SIGNIFICANT CHANGE UP (ref 0–0.06)
TROPONIN T SERPL-MCNC: < 0.06 NG/ML — SIGNIFICANT CHANGE UP (ref 0–0.06)
UROBILINOGEN FLD QL: NORMAL E.U. — SIGNIFICANT CHANGE UP (ref 0.1–0.2)
WBC # BLD: 14.01 K/UL — HIGH (ref 3.8–10.5)
WBC # FLD AUTO: 14.01 K/UL — HIGH (ref 3.8–10.5)
WBC UR QL: SIGNIFICANT CHANGE UP (ref 0–?)

## 2017-06-13 PROCEDURE — 71010: CPT | Mod: 26

## 2017-06-13 RX ORDER — LISINOPRIL 2.5 MG/1
40 TABLET ORAL DAILY
Qty: 0 | Refills: 0 | Status: DISCONTINUED | OUTPATIENT
Start: 2017-06-13 | End: 2017-06-16

## 2017-06-13 RX ORDER — INSULIN LISPRO 100/ML
VIAL (ML) SUBCUTANEOUS
Qty: 0 | Refills: 0 | Status: DISCONTINUED | OUTPATIENT
Start: 2017-06-13 | End: 2017-06-16

## 2017-06-13 RX ORDER — FUROSEMIDE 40 MG
40 TABLET ORAL
Qty: 0 | Refills: 0 | Status: DISCONTINUED | OUTPATIENT
Start: 2017-06-13 | End: 2017-06-16

## 2017-06-13 RX ORDER — FERROUS SULFATE 325(65) MG
325 TABLET ORAL DAILY
Qty: 0 | Refills: 0 | Status: DISCONTINUED | OUTPATIENT
Start: 2017-06-13 | End: 2017-06-16

## 2017-06-13 RX ORDER — DEXTROSE 50 % IN WATER 50 %
25 SYRINGE (ML) INTRAVENOUS ONCE
Qty: 0 | Refills: 0 | Status: DISCONTINUED | OUTPATIENT
Start: 2017-06-13 | End: 2017-06-16

## 2017-06-13 RX ORDER — SODIUM CHLORIDE 9 MG/ML
1000 INJECTION, SOLUTION INTRAVENOUS
Qty: 0 | Refills: 0 | Status: DISCONTINUED | OUTPATIENT
Start: 2017-06-13 | End: 2017-06-16

## 2017-06-13 RX ORDER — ASPIRIN/CALCIUM CARB/MAGNESIUM 324 MG
81 TABLET ORAL DAILY
Qty: 0 | Refills: 0 | Status: DISCONTINUED | OUTPATIENT
Start: 2017-06-13 | End: 2017-06-16

## 2017-06-13 RX ORDER — ATORVASTATIN CALCIUM 80 MG/1
80 TABLET, FILM COATED ORAL AT BEDTIME
Qty: 0 | Refills: 0 | Status: DISCONTINUED | OUTPATIENT
Start: 2017-06-13 | End: 2017-06-16

## 2017-06-13 RX ORDER — DEXTROSE 50 % IN WATER 50 %
1 SYRINGE (ML) INTRAVENOUS ONCE
Qty: 0 | Refills: 0 | Status: DISCONTINUED | OUTPATIENT
Start: 2017-06-13 | End: 2017-06-16

## 2017-06-13 RX ORDER — FUROSEMIDE 40 MG
80 TABLET ORAL ONCE
Qty: 0 | Refills: 0 | Status: COMPLETED | OUTPATIENT
Start: 2017-06-13 | End: 2017-06-13

## 2017-06-13 RX ORDER — DEXTROSE 50 % IN WATER 50 %
12.5 SYRINGE (ML) INTRAVENOUS ONCE
Qty: 0 | Refills: 0 | Status: DISCONTINUED | OUTPATIENT
Start: 2017-06-13 | End: 2017-06-16

## 2017-06-13 RX ORDER — CLOPIDOGREL BISULFATE 75 MG/1
75 TABLET, FILM COATED ORAL DAILY
Qty: 0 | Refills: 0 | Status: DISCONTINUED | OUTPATIENT
Start: 2017-06-13 | End: 2017-06-16

## 2017-06-13 RX ORDER — CARVEDILOL PHOSPHATE 80 MG/1
6.25 CAPSULE, EXTENDED RELEASE ORAL EVERY 12 HOURS
Qty: 0 | Refills: 0 | Status: DISCONTINUED | OUTPATIENT
Start: 2017-06-13 | End: 2017-06-16

## 2017-06-13 RX ORDER — GLUCAGON INJECTION, SOLUTION 0.5 MG/.1ML
1 INJECTION, SOLUTION SUBCUTANEOUS ONCE
Qty: 0 | Refills: 0 | Status: DISCONTINUED | OUTPATIENT
Start: 2017-06-13 | End: 2017-06-16

## 2017-06-13 RX ORDER — NITROGLYCERIN 6.5 MG
0.4 CAPSULE, EXTENDED RELEASE ORAL ONCE
Qty: 0 | Refills: 0 | Status: COMPLETED | OUTPATIENT
Start: 2017-06-13 | End: 2017-06-13

## 2017-06-13 RX ORDER — WARFARIN SODIUM 2.5 MG/1
5 TABLET ORAL ONCE
Qty: 0 | Refills: 0 | Status: COMPLETED | OUTPATIENT
Start: 2017-06-13 | End: 2017-06-13

## 2017-06-13 RX ADMIN — Medication 0.4 MILLIGRAM(S): at 03:38

## 2017-06-13 RX ADMIN — Medication 325 MILLIGRAM(S): at 12:42

## 2017-06-13 RX ADMIN — Medication 80 MILLIGRAM(S): at 03:38

## 2017-06-13 RX ADMIN — ATORVASTATIN CALCIUM 80 MILLIGRAM(S): 80 TABLET, FILM COATED ORAL at 21:20

## 2017-06-13 RX ADMIN — Medication 40 MILLIGRAM(S): at 21:20

## 2017-06-13 RX ADMIN — Medication 40 MILLIGRAM(S): at 12:42

## 2017-06-13 RX ADMIN — CLOPIDOGREL BISULFATE 75 MILLIGRAM(S): 75 TABLET, FILM COATED ORAL at 12:42

## 2017-06-13 RX ADMIN — WARFARIN SODIUM 5 MILLIGRAM(S): 2.5 TABLET ORAL at 18:00

## 2017-06-13 RX ADMIN — LISINOPRIL 40 MILLIGRAM(S): 2.5 TABLET ORAL at 12:42

## 2017-06-13 RX ADMIN — Medication 81 MILLIGRAM(S): at 12:42

## 2017-06-13 RX ADMIN — CARVEDILOL PHOSPHATE 6.25 MILLIGRAM(S): 80 CAPSULE, EXTENDED RELEASE ORAL at 17:59

## 2017-06-13 NOTE — ED ADULT NURSE REASSESSMENT NOTE - NS ED NURSE REASSESS COMMENT FT1
[place] placed per MD orders. Patient tolerated procedure well.  Procedure performed under sterile technique.  Will continue to monitor patient.

## 2017-06-13 NOTE — ED PROVIDER NOTE - PROGRESS NOTE DETAILS
Dr. Mulligan: Pt much more comfortable after BIPAP and diuresis, seen by CCU NP, recommend admission to telemetry. Pt was admitted to Dr. Young on May 16, d/w Dr. Young, pt accepted to his service. Verbal sign out given to the tele PA.

## 2017-06-13 NOTE — H&P ADULT - PROBLEM SELECTOR PLAN 1
Admit to Telemetry, serial CE's, EKG's, FLP, continue IV Lasix, monitor daily weights, strict I's & O's. F/U Cardiology note

## 2017-06-13 NOTE — ED PROVIDER NOTE - CRITICAL CARE PROVIDED
direct patient care (not related to procedure)/additional history taking/documentation/consult w/ pt's family directly relating to pts condition/interpretation of diagnostic studies

## 2017-06-13 NOTE — H&P ADULT - HISTORY OF PRESENT ILLNESS
77 yo M s/p recent admission to [facility] for CHF from 5/16-18 now p/w SOB. Pt went to sleep ok, woke up at 2am secondary to dyspnea. Pt felt he could not breathe, also felt diaphoretic, slight wheezing and was coughing. No CP, palp's, LE edema. Pt admits he has been coughing occasionally for a long time now and noticed phlegm is sometimes blood tinged since he started coumadin in 4/17. Pt admits he sometimes gets SOB when he goes to sleep. + Orthopnea, sleeps with 4 pillows since last month, denies LOBO, weight loss or weight gain. Pt also admits that he feels like he is not making as much urine since his last hospitalization. Pt is complaint with his lasix & low salt diet reportedly. Pt was started on BIPAP by EMS and tx'd with IV Lasix.

## 2017-06-13 NOTE — ED PROVIDER NOTE - ATTENDING CONTRIBUTION TO CARE
ED Attending Dr. Kohli: 77 yo male with CAD s/p stents, afib (paroxysmal) on warfarin, CHF, HTN, DM, in ED with SOB x 24 hours.  Arrived ED tachypneic and tachycardic, in moderate respiratory distress and diaphoretic.  Heart tachycardic, lungs moderate rales bilaterally, abd NTND, extremities without swelling.  Immediately given nitro sublingual and furosemide and started on BiPAP.

## 2017-06-13 NOTE — ED ADULT NURSE NOTE - OBJECTIVE STATEMENT
Patient is a 77 y/o male, awake, A&O x 3, presents complaining of SOB.  Patient appears uncomfortable, using accessory muscles and audile wheezing noted.  Patient placed on NR @ 15L and Dr. Arriaga notified.  MD at bedside, RT placed patient on CPAP.  Patient tolerating CPAP well.  Per patient wife, patient started experiencing SOB this morning.  Patient denies chest pain, nausea, vomiting or dizziness.  Patient has a history of CAD, CHF, DM, and HTN.  Patient denies being placed on CPAP in the past or similar symptoms.  Vitals taken, IV placed, labs drawn and sent and will continue to monitor patient.

## 2017-06-13 NOTE — ED ADULT TRIAGE NOTE - CHIEF COMPLAINT QUOTE
pt comes to ED the SOB. pt has hx of heart failure. pt states he was seen here 2 weeks ago for similar symptoms. pt appears tachypneic in triage. O2 provided. pt denies CP EKG performed in triage

## 2017-06-13 NOTE — H&P ADULT - ATTENDING COMMENTS
Pt seen and examined at bedside. Agree with assessment and plan as above  Admitted with acute on chronic systolic heart failure  IV lasix  Monitor uO and Cr  Home meds

## 2017-06-13 NOTE — ED PROVIDER NOTE - MEDICAL DECISION MAKING DETAILS
76M w/ HFrEF (EF 45%), CAD s/p MI & stents, pAFib, p/w acute hypoxic respiratory failure 2/2 suspected HFrEF exacerbation w/ likely concurrent flash pulmonary edema in setting of HTN emergency - CBC, CMP, Pro-BNP, cardiac enzymes, s/p Lasix 80, started on Bipap - will likely need cardiology/CCU eval vs. MICU eval

## 2017-06-13 NOTE — ED PROVIDER NOTE - OBJECTIVE STATEMENT
76M w/ HFrEF (EF 45% in April 2017), CAD s/p MI x 4 (last in Feb 2017 in [place]), s/p stents x 2 (performed at [facility] in March 2017), pAFib on coumadin, p/w acute onset shortness of breath beginning in the AM. Pt. denies any CP, dizziness/LOC, palpitations. His last heart failure exacerbation was in May 2017, for which he was admitted here at [facility]. He has never needed BiPap, or CCU admission for management. Pt. endorses severe SOB, denies any syncope. Pt. noted to be speaking in two or three word sentences during initial evaluation, w/ SBP in 200s. Pt. was given Lasix 80 IV x 1, Nitroglycerin 0.4 x 1 sublingually, and started on BiPap.

## 2017-06-13 NOTE — H&P ADULT - NEGATIVE ENMT SYMPTOMS
no tinnitus/no nasal discharge/no vertigo/no sinus symptoms/no dysphagia/no nasal congestion/no hearing difficulty/no throat pain/no ear pain

## 2017-06-14 LAB
ALBUMIN SERPL ELPH-MCNC: 3.8 G/DL — SIGNIFICANT CHANGE UP (ref 3.3–5)
ALP SERPL-CCNC: 92 U/L — SIGNIFICANT CHANGE UP (ref 40–120)
ALT FLD-CCNC: 37 U/L — SIGNIFICANT CHANGE UP (ref 4–41)
AST SERPL-CCNC: 27 U/L — SIGNIFICANT CHANGE UP (ref 4–40)
BASOPHILS # BLD AUTO: 0.01 K/UL — SIGNIFICANT CHANGE UP (ref 0–0.2)
BASOPHILS NFR BLD AUTO: 0.1 % — SIGNIFICANT CHANGE UP (ref 0–2)
BILIRUB SERPL-MCNC: 0.7 MG/DL — SIGNIFICANT CHANGE UP (ref 0.2–1.2)
BUN SERPL-MCNC: 17 MG/DL — SIGNIFICANT CHANGE UP (ref 7–23)
CALCIUM SERPL-MCNC: 8.2 MG/DL — LOW (ref 8.4–10.5)
CHLORIDE SERPL-SCNC: 103 MMOL/L — SIGNIFICANT CHANGE UP (ref 98–107)
CHOLEST SERPL-MCNC: 122 MG/DL — SIGNIFICANT CHANGE UP (ref 120–199)
CO2 SERPL-SCNC: 23 MMOL/L — SIGNIFICANT CHANGE UP (ref 22–31)
CREAT SERPL-MCNC: 1.04 MG/DL — SIGNIFICANT CHANGE UP (ref 0.5–1.3)
EOSINOPHIL # BLD AUTO: 0.08 K/UL — SIGNIFICANT CHANGE UP (ref 0–0.5)
EOSINOPHIL NFR BLD AUTO: 1 % — SIGNIFICANT CHANGE UP (ref 0–6)
GLUCOSE SERPL-MCNC: 114 MG/DL — HIGH (ref 70–99)
HBA1C BLD-MCNC: 6 % — HIGH (ref 4–5.6)
HCT VFR BLD CALC: 41.8 % — SIGNIFICANT CHANGE UP (ref 39–50)
HDLC SERPL-MCNC: 34 MG/DL — LOW (ref 35–55)
HGB BLD-MCNC: 12.9 G/DL — LOW (ref 13–17)
IMM GRANULOCYTES NFR BLD AUTO: 0.1 % — SIGNIFICANT CHANGE UP (ref 0–1.5)
INR BLD: 2.07 — HIGH (ref 0.88–1.17)
LIPID PNL WITH DIRECT LDL SERPL: 73 MG/DL — SIGNIFICANT CHANGE UP
LYMPHOCYTES # BLD AUTO: 3.39 K/UL — HIGH (ref 1–3.3)
LYMPHOCYTES # BLD AUTO: 41 % — SIGNIFICANT CHANGE UP (ref 13–44)
MAGNESIUM SERPL-MCNC: 1.9 MG/DL — SIGNIFICANT CHANGE UP (ref 1.6–2.6)
MCHC RBC-ENTMCNC: 25.1 PG — LOW (ref 27–34)
MCHC RBC-ENTMCNC: 30.9 % — LOW (ref 32–36)
MCV RBC AUTO: 81.3 FL — SIGNIFICANT CHANGE UP (ref 80–100)
MONOCYTES # BLD AUTO: 0.69 K/UL — SIGNIFICANT CHANGE UP (ref 0–0.9)
MONOCYTES NFR BLD AUTO: 8.3 % — SIGNIFICANT CHANGE UP (ref 2–14)
NEUTROPHILS # BLD AUTO: 4.09 K/UL — SIGNIFICANT CHANGE UP (ref 1.8–7.4)
NEUTROPHILS NFR BLD AUTO: 49.5 % — SIGNIFICANT CHANGE UP (ref 43–77)
PHOSPHATE SERPL-MCNC: 1.9 MG/DL — LOW (ref 2.5–4.5)
PLATELET # BLD AUTO: 250 K/UL — SIGNIFICANT CHANGE UP (ref 150–400)
PMV BLD: 11 FL — SIGNIFICANT CHANGE UP (ref 7–13)
POTASSIUM SERPL-MCNC: 3.7 MMOL/L — SIGNIFICANT CHANGE UP (ref 3.5–5.3)
POTASSIUM SERPL-SCNC: 3.7 MMOL/L — SIGNIFICANT CHANGE UP (ref 3.5–5.3)
PROT SERPL-MCNC: 7.7 G/DL — SIGNIFICANT CHANGE UP (ref 6–8.3)
PROTHROM AB SERPL-ACNC: 23.5 SEC — HIGH (ref 9.8–13.1)
RBC # BLD: 5.14 M/UL — SIGNIFICANT CHANGE UP (ref 4.2–5.8)
RBC # FLD: 15.9 % — HIGH (ref 10.3–14.5)
SODIUM SERPL-SCNC: 142 MMOL/L — SIGNIFICANT CHANGE UP (ref 135–145)
SPECIMEN SOURCE: SIGNIFICANT CHANGE UP
TRIGL SERPL-MCNC: 71 MG/DL — SIGNIFICANT CHANGE UP (ref 10–149)
WBC # BLD: 8.27 K/UL — SIGNIFICANT CHANGE UP (ref 3.8–10.5)
WBC # FLD AUTO: 8.27 K/UL — SIGNIFICANT CHANGE UP (ref 3.8–10.5)

## 2017-06-14 RX ORDER — DOCUSATE SODIUM 100 MG
100 CAPSULE ORAL THREE TIMES A DAY
Qty: 0 | Refills: 0 | Status: DISCONTINUED | OUTPATIENT
Start: 2017-06-14 | End: 2017-06-16

## 2017-06-14 RX ORDER — WARFARIN SODIUM 2.5 MG/1
5 TABLET ORAL ONCE
Qty: 0 | Refills: 0 | Status: COMPLETED | OUTPATIENT
Start: 2017-06-14 | End: 2017-06-14

## 2017-06-14 RX ORDER — POTASSIUM CHLORIDE 20 MEQ
20 PACKET (EA) ORAL ONCE
Qty: 0 | Refills: 0 | Status: COMPLETED | OUTPATIENT
Start: 2017-06-14 | End: 2017-06-14

## 2017-06-14 RX ORDER — SENNA PLUS 8.6 MG/1
2 TABLET ORAL AT BEDTIME
Qty: 0 | Refills: 0 | Status: DISCONTINUED | OUTPATIENT
Start: 2017-06-14 | End: 2017-06-16

## 2017-06-14 RX ADMIN — WARFARIN SODIUM 5 MILLIGRAM(S): 2.5 TABLET ORAL at 17:09

## 2017-06-14 RX ADMIN — CARVEDILOL PHOSPHATE 6.25 MILLIGRAM(S): 80 CAPSULE, EXTENDED RELEASE ORAL at 06:06

## 2017-06-14 RX ADMIN — CARVEDILOL PHOSPHATE 6.25 MILLIGRAM(S): 80 CAPSULE, EXTENDED RELEASE ORAL at 17:10

## 2017-06-14 RX ADMIN — Medication 40 MILLIGRAM(S): at 06:06

## 2017-06-14 RX ADMIN — LISINOPRIL 40 MILLIGRAM(S): 2.5 TABLET ORAL at 06:06

## 2017-06-14 RX ADMIN — Medication 81 MILLIGRAM(S): at 13:10

## 2017-06-14 RX ADMIN — Medication 100 MILLIGRAM(S): at 21:46

## 2017-06-14 RX ADMIN — Medication 40 MILLIGRAM(S): at 17:10

## 2017-06-14 RX ADMIN — Medication 100 MILLIGRAM(S): at 13:10

## 2017-06-14 RX ADMIN — Medication 325 MILLIGRAM(S): at 13:11

## 2017-06-14 RX ADMIN — SENNA PLUS 2 TABLET(S): 8.6 TABLET ORAL at 21:46

## 2017-06-14 RX ADMIN — ATORVASTATIN CALCIUM 80 MILLIGRAM(S): 80 TABLET, FILM COATED ORAL at 21:46

## 2017-06-14 RX ADMIN — Medication: at 13:11

## 2017-06-14 RX ADMIN — CLOPIDOGREL BISULFATE 75 MILLIGRAM(S): 75 TABLET, FILM COATED ORAL at 13:10

## 2017-06-14 RX ADMIN — Medication 20 MILLIEQUIVALENT(S): at 13:10

## 2017-06-14 NOTE — DISCHARGE NOTE ADULT - PATIENT PORTAL LINK FT
“You can access the Follow[employer] Patient Portal, offered by [facility], by registering with the following website: [email or web address]”

## 2017-06-14 NOTE — DISCHARGE NOTE ADULT - PLAN OF CARE
s/p IV lasix Please follow up with your primary care provider within 2 weeks call for an appointment

## 2017-06-14 NOTE — DISCHARGE NOTE ADULT - MEDICATION SUMMARY - MEDICATIONS TO CHANGE
I will SWITCH the dose or number of times a day I take the medications listed below when I get home from the hospital:    Lasix 40 mg oral tablet  -- 40 tab(s) by mouth once a day

## 2017-06-14 NOTE — DISCHARGE NOTE ADULT - CARE PROVIDER_API CALL
Wilfrid Young (), Cardiology; Internal Medicine  [address] [place] [address] [address]  [place] [address]  Phone: [phone]  Fax: [phone]

## 2017-06-14 NOTE — DIETITIAN INITIAL EVALUATION ADULT. - OTHER INFO
Pt states that his appetite has been good and he has been eating well. Pt states that he knows and follows his diet at home. Reviewed diet with Pt and wife who both verbalized good understanding. Pt has no complaints of GI distress nor of difficulty chewing or swallowing.

## 2017-06-14 NOTE — DISCHARGE NOTE ADULT - HOSPITAL COURSE
This is a 77 yo Madmitted with CHF , acute on chronic  systolic. Patient ahd a EKG with NSR. CXR with mild pulmonary edema. UA negative. Patient continued the coumadin for the atrial fib. Patient stable for discharge to home as per Dr Young on 6/16 the lasix home dose was increased to 80mg daily, spironolactone and potassium added.

## 2017-06-14 NOTE — PROGRESS NOTE ADULT - SUBJECTIVE AND OBJECTIVE BOX
Subjective: Patient seen and examined. No new events except as noted.   feeling better     REVIEW OF SYSTEMS:    CONSTITUTIONAL: No weakness, fevers or chills  EYES/ENT: No visual changes;  No vertigo or throat pain   NECK: No pain or stiffness  RESPIRATORY: +shortness of breath  CARDIOVASCULAR: No chest pain or palpitations  GASTROINTESTINAL: No abdominal or epigastric pain. No nausea, vomiting, or hematemesis; No diarrhea or constipation. No melena or hematochezia.  GENITOURINARY: No dysuria, frequency or hematuria  NEUROLOGICAL: No numbness or weakness  SKIN: No itching, burning, rashes, or lesions   All other review of systems is negative unless indicated above.    MEDICATIONS:  MEDICATIONS  (STANDING):  lisinopril 40milliGRAM(s) Oral daily  atorvastatin 80milliGRAM(s) Oral at bedtime  clopidogrel Tablet 75milliGRAM(s) Oral daily  carvedilol 6.25milliGRAM(s) Oral every 12 hours  aspirin enteric coated 81milliGRAM(s) Oral daily  ferrous    sulfate 325milliGRAM(s) Oral daily  furosemide   Injectable 40milliGRAM(s) IV Push two times a day  insulin lispro (HumaLOG) corrective regimen sliding scale  SubCutaneous three times a day before meals  dextrose 5%. 1000milliLiter(s) IV Continuous <Continuous>  dextrose 50% Injectable 12.5Gram(s) IV Push once  dextrose 50% Injectable 25Gram(s) IV Push once  dextrose 50% Injectable 25Gram(s) IV Push once  docusate sodium 100milliGRAM(s) Oral three times a day  senna 2Tablet(s) Oral at bedtime      PHYSICAL EXAM:  T(C): 36.7, Max: 36.8 (06-14 @ 06:04)  HR: 65 (61 - 66)  BP: 124/91 (121/84 - 130/83)  RR: 17 (17 - 18)  SpO2: 100% (99% - 100%)  Wt(kg): --  I&O's Summary  I & Os for 24h ending 14 Jun 2017 07:00  =============================================  IN: 200 ml / OUT: 2000 ml / NET: -1800 ml    I & Os for current day (as of 14 Jun 2017 18:46)  =============================================  IN: 640 ml / OUT: 800 ml / NET: -160 ml    Height (cm): 170.2 (06-13 @ 21:06)  Weight (kg): 66.7 (06-13 @ 21:06)  BMI (kg/m2): 23 (06-13 @ 21:06)  BSA (m2): 1.77 (06-13 @ 21:06)    Appearance: Normal	  HEENT:   Normal oral mucosa, PERRL, EOMI	  Lymphatic: No lymphadenopathy , no edema  Cardiovascular: Normal S1 S2, No JVD, No murmurs , Peripheral pulses palpable 2+ bilaterally  Respiratory: crackles bilateral bases	  Gastrointestinal:  Soft, Non-tender, + BS	  Skin: No rashes, No ecchymoses, No cyanosis, warm to touch  Musculoskeletal: Normal range of motion, normal strength  Psychiatry:  Mood & affect appropriate  Ext: No edema      LABS:    CARDIAC MARKERS:  CARDIAC MARKERS ( 13 Jun 2017 12:35 )  x     / < 0.06 ng/mL / 93 u/L / x     / x      CARDIAC MARKERS ( 13 Jun 2017 06:27 )  x     / < 0.06 ng/mL / 95 u/L / 3.09 ng/mL / x                                    12.9   8.27  )-----------( 250      ( 14 Jun 2017 05:15 )             41.8     06-14    142  |  103  |  17  ----------------------------<  114<H>  3.7   |  23  |  1.04    Ca    8.2<L>      14 Jun 2017 05:15  Phos  1.9     06-14  Mg     1.9     06-14    TPro  7.7  /  Alb  3.8  /  TBili  0.7  /  DBili  x   /  AST  27  /  ALT  37  /  AlkPhos  92  06-14    proBNP:   Lipid Profile:   HgA1c: Hemoglobin A1C, Whole Blood: 6.0 % (06-14 @ 05:15)    TSH:           TELEMETRY: 	nsr    ECG:  	  RADIOLOGY:   DIAGNOSTIC TESTING:  [ ] Echocardiogram:  [ ]  Catheterization:  [ ] Stress Test:    OTHER:

## 2017-06-14 NOTE — DISCHARGE NOTE ADULT - MEDICATION SUMMARY - MEDICATIONS TO TAKE
I will START or STAY ON the medications listed below when I get home from the hospital:    spironolactone 25 mg oral tablet  -- 1 tab(s) by mouth once a day  -- Indication: For HTN    aspirin 81 mg oral delayed release tablet  -- 1 tab(s) by mouth once a day  -- Indication: For Heart    lisinopril 40 mg oral tablet  -- 1 tab(s) by mouth once a day  -- Indication: For HTN    warfarin 4 mg oral tablet  -- 1 tab(s) by mouth once  -- Indication: For Blood thinner    Tradjenta 5 mg oral tablet  -- 1 tab(s) by mouth once a day  -- Indication: For Diabetes    atorvastatin 80 mg oral tablet  -- 1 tab(s) by mouth once a day (at bedtime)  -- Indication: For Cholesterol    clopidogrel 75 mg oral tablet  -- 1 tab(s) by mouth once a day  -- Indication: For heart    carvedilol 6.25 mg oral tablet  -- 1 tab(s) by mouth every 12 hours  -- Indication: For HTN    furosemide 80 mg oral tablet  -- 1 tab(s) by mouth once a day  -- Indication: For HTN    ferrous sulfate 325 mg (65 mg elemental iron) oral delayed release tablet  -- 1 tab(s) by mouth once a day  -- Indication: For supplement    potassium chloride 20 mEq oral tablet, extended release  -- 1 tab(s) by mouth 2 times a day  -- Indication: For Supplement

## 2017-06-14 NOTE — DISCHARGE NOTE ADULT - HOME CARE AGENCY
[place] [phone], initial visit will be upon discharge home, a nurse will call to arrange home visit.

## 2017-06-14 NOTE — DISCHARGE NOTE ADULT - CARE PLAN
Principal Discharge DX:	Acute systolic congestive heart failure  Goal:	s/p IV lasix  Instructions for follow-up, activity and diet:	Please follow up with your primary care provider within 2 weeks call for an appointment

## 2017-06-15 LAB
BASOPHILS # BLD AUTO: 0.02 K/UL — SIGNIFICANT CHANGE UP (ref 0–0.2)
BASOPHILS NFR BLD AUTO: 0.3 % — SIGNIFICANT CHANGE UP (ref 0–2)
BUN SERPL-MCNC: 18 MG/DL — SIGNIFICANT CHANGE UP (ref 7–23)
CALCIUM SERPL-MCNC: 8.4 MG/DL — SIGNIFICANT CHANGE UP (ref 8.4–10.5)
CHLORIDE SERPL-SCNC: 103 MMOL/L — SIGNIFICANT CHANGE UP (ref 98–107)
CO2 SERPL-SCNC: 21 MMOL/L — LOW (ref 22–31)
CREAT SERPL-MCNC: 0.87 MG/DL — SIGNIFICANT CHANGE UP (ref 0.5–1.3)
EOSINOPHIL # BLD AUTO: 0.13 K/UL — SIGNIFICANT CHANGE UP (ref 0–0.5)
EOSINOPHIL NFR BLD AUTO: 1.6 % — SIGNIFICANT CHANGE UP (ref 0–6)
GLUCOSE SERPL-MCNC: 106 MG/DL — HIGH (ref 70–99)
HCT VFR BLD CALC: 41.3 % — SIGNIFICANT CHANGE UP (ref 39–50)
HGB BLD-MCNC: 12.9 G/DL — LOW (ref 13–17)
IMM GRANULOCYTES NFR BLD AUTO: 0.3 % — SIGNIFICANT CHANGE UP (ref 0–1.5)
INR BLD: 2.24 — HIGH (ref 0.88–1.17)
LYMPHOCYTES # BLD AUTO: 3.97 K/UL — HIGH (ref 1–3.3)
LYMPHOCYTES # BLD AUTO: 49.7 % — HIGH (ref 13–44)
MCHC RBC-ENTMCNC: 25.2 PG — LOW (ref 27–34)
MCHC RBC-ENTMCNC: 31.2 % — LOW (ref 32–36)
MCV RBC AUTO: 80.8 FL — SIGNIFICANT CHANGE UP (ref 80–100)
MONOCYTES # BLD AUTO: 0.7 K/UL — SIGNIFICANT CHANGE UP (ref 0–0.9)
MONOCYTES NFR BLD AUTO: 8.8 % — SIGNIFICANT CHANGE UP (ref 2–14)
NEUTROPHILS # BLD AUTO: 3.15 K/UL — SIGNIFICANT CHANGE UP (ref 1.8–7.4)
NEUTROPHILS NFR BLD AUTO: 39.3 % — LOW (ref 43–77)
PLATELET # BLD AUTO: 261 K/UL — SIGNIFICANT CHANGE UP (ref 150–400)
PMV BLD: 11.1 FL — SIGNIFICANT CHANGE UP (ref 7–13)
POTASSIUM SERPL-MCNC: 3.5 MMOL/L — SIGNIFICANT CHANGE UP (ref 3.5–5.3)
POTASSIUM SERPL-SCNC: 3.5 MMOL/L — SIGNIFICANT CHANGE UP (ref 3.5–5.3)
PROTHROM AB SERPL-ACNC: 25.5 SEC — HIGH (ref 9.8–13.1)
RBC # BLD: 5.11 M/UL — SIGNIFICANT CHANGE UP (ref 4.2–5.8)
RBC # FLD: 15.7 % — HIGH (ref 10.3–14.5)
SODIUM SERPL-SCNC: 140 MMOL/L — SIGNIFICANT CHANGE UP (ref 135–145)
WBC # BLD: 7.99 K/UL — SIGNIFICANT CHANGE UP (ref 3.8–10.5)
WBC # FLD AUTO: 7.99 K/UL — SIGNIFICANT CHANGE UP (ref 3.8–10.5)

## 2017-06-15 RX ORDER — POTASSIUM CHLORIDE 20 MEQ
20 PACKET (EA) ORAL ONCE
Qty: 0 | Refills: 0 | Status: COMPLETED | OUTPATIENT
Start: 2017-06-15 | End: 2017-06-15

## 2017-06-15 RX ORDER — WARFARIN SODIUM 2.5 MG/1
4 TABLET ORAL ONCE
Qty: 0 | Refills: 0 | Status: COMPLETED | OUTPATIENT
Start: 2017-06-15 | End: 2017-06-15

## 2017-06-15 RX ADMIN — Medication 100 MILLIGRAM(S): at 22:31

## 2017-06-15 RX ADMIN — WARFARIN SODIUM 4 MILLIGRAM(S): 2.5 TABLET ORAL at 17:01

## 2017-06-15 RX ADMIN — Medication 20 MILLIEQUIVALENT(S): at 10:55

## 2017-06-15 RX ADMIN — CARVEDILOL PHOSPHATE 6.25 MILLIGRAM(S): 80 CAPSULE, EXTENDED RELEASE ORAL at 05:32

## 2017-06-15 RX ADMIN — Medication 40 MILLIGRAM(S): at 05:33

## 2017-06-15 RX ADMIN — SENNA PLUS 2 TABLET(S): 8.6 TABLET ORAL at 22:31

## 2017-06-15 RX ADMIN — Medication 325 MILLIGRAM(S): at 10:56

## 2017-06-15 RX ADMIN — Medication 100 MILLIGRAM(S): at 05:32

## 2017-06-15 RX ADMIN — LISINOPRIL 40 MILLIGRAM(S): 2.5 TABLET ORAL at 05:32

## 2017-06-15 RX ADMIN — CLOPIDOGREL BISULFATE 75 MILLIGRAM(S): 75 TABLET, FILM COATED ORAL at 10:56

## 2017-06-15 RX ADMIN — CARVEDILOL PHOSPHATE 6.25 MILLIGRAM(S): 80 CAPSULE, EXTENDED RELEASE ORAL at 16:59

## 2017-06-15 RX ADMIN — Medication 81 MILLIGRAM(S): at 10:56

## 2017-06-15 RX ADMIN — Medication 100 MILLIGRAM(S): at 10:57

## 2017-06-15 RX ADMIN — ATORVASTATIN CALCIUM 80 MILLIGRAM(S): 80 TABLET, FILM COATED ORAL at 22:31

## 2017-06-15 RX ADMIN — Medication 40 MILLIGRAM(S): at 16:59

## 2017-06-15 NOTE — PROGRESS NOTE ADULT - PROBLEM SELECTOR PROBLEM 1
Acute on chronic combined systolic and diastolic congestive heart failure
Acute on chronic combined systolic and diastolic congestive heart failure

## 2017-06-15 NOTE — PROGRESS NOTE ADULT - SUBJECTIVE AND OBJECTIVE BOX
Subjective: Patient seen and examined. No new events except as noted.     REVIEW OF SYSTEMS:    CONSTITUTIONAL: No weakness, fevers or chills  EYES/ENT: No visual changes;  No vertigo or throat pain   NECK: No pain or stiffness  RESPIRATORY: No cough, wheezing, hemoptysis; No shortness of breath  CARDIOVASCULAR: No chest pain or palpitations  GASTROINTESTINAL: No abdominal or epigastric pain. No nausea, vomiting, or hematemesis; No diarrhea or constipation. No melena or hematochezia.  GENITOURINARY: No dysuria, frequency or hematuria  NEUROLOGICAL: No numbness or weakness  SKIN: No itching, burning, rashes, or lesions   All other review of systems is negative unless indicated above.    MEDICATIONS:  MEDICATIONS  (STANDING):  lisinopril 40milliGRAM(s) Oral daily  atorvastatin 80milliGRAM(s) Oral at bedtime  clopidogrel Tablet 75milliGRAM(s) Oral daily  carvedilol 6.25milliGRAM(s) Oral every 12 hours  aspirin enteric coated 81milliGRAM(s) Oral daily  ferrous    sulfate 325milliGRAM(s) Oral daily  furosemide   Injectable 40milliGRAM(s) IV Push two times a day  insulin lispro (HumaLOG) corrective regimen sliding scale  SubCutaneous three times a day before meals  dextrose 5%. 1000milliLiter(s) IV Continuous <Continuous>  dextrose 50% Injectable 12.5Gram(s) IV Push once  dextrose 50% Injectable 25Gram(s) IV Push once  dextrose 50% Injectable 25Gram(s) IV Push once  docusate sodium 100milliGRAM(s) Oral three times a day  senna 2Tablet(s) Oral at bedtime      PHYSICAL EXAM:  T(C): 36.3, Max: 36.9 (06-14 @ 21:44)  HR: 55 (55 - 65)  BP: 103/66 (103/66 - 124/91)  RR: 17 (16 - 18)  SpO2: 100% (100% - 100%)  Wt(kg): --  I&O's Summary  I & Os for 24h ending 15 Asael 2017 07:00  =============================================  IN: 947 ml / OUT: 1500 ml / NET: -553 ml    I & Os for current day (as of 15 Asael 2017 12:04)  =============================================  IN: 320 ml / OUT: 500 ml / NET: -180 ml        Appearance: Normal	  HEENT:   Normal oral mucosa, PERRL, EOMI	  Lymphatic: No lymphadenopathy , no edema  Cardiovascular: Normal S1 S2, No JVD, No murmurs , Peripheral pulses palpable 2+ bilaterally  Respiratory: Lungs clear to auscultation, normal effort 	  Gastrointestinal:  Soft, Non-tender, + BS	  Skin: No rashes, No ecchymoses, No cyanosis, warm to touch  Musculoskeletal: Normal range of motion, normal strength  Psychiatry:  Mood & affect appropriate  Ext: No edema      LABS:    CARDIAC MARKERS:  CARDIAC MARKERS ( 13 Jun 2017 12:35 )  x     / < 0.06 ng/mL / 93 u/L / x     / x      CARDIAC MARKERS ( 13 Jun 2017 06:27 )  x     / < 0.06 ng/mL / 95 u/L / 3.09 ng/mL / x                                    12.9   7.99  )-----------( 261      ( 15 Asael 2017 05:45 )             41.3     06-15    140  |  103  |  18  ----------------------------<  106<H>  3.5   |  21<L>  |  0.87    Ca    8.4      15 Asael 2017 05:45  Phos  1.9     06-14  Mg     1.9     06-14    TPro  7.7  /  Alb  3.8  /  TBili  0.7  /  DBili  x   /  AST  27  /  ALT  37  /  AlkPhos  92  06-14    proBNP:   Lipid Profile:   HgA1c:   TSH:           TELEMETRY: 	sr    ECG:  	  RADIOLOGY:   DIAGNOSTIC TESTING:  [ ] Echocardiogram:  [ ]  Catheterization:  [ ] Stress Test:    OTHER:

## 2017-06-15 NOTE — PROGRESS NOTE ADULT - PROBLEM SELECTOR PLAN 1
Continue with IV lasix as ordered  Likely will change to PO lasix tomorrow  Monitor UO
Continue with IV lasix as ordered  Monitor UO

## 2017-06-16 VITALS
RESPIRATION RATE: 16 BRPM | SYSTOLIC BLOOD PRESSURE: 107 MMHG | TEMPERATURE: 97 F | HEART RATE: 61 BPM | DIASTOLIC BLOOD PRESSURE: 64 MMHG | OXYGEN SATURATION: 100 %

## 2017-06-16 LAB
BUN SERPL-MCNC: 17 MG/DL — SIGNIFICANT CHANGE UP (ref 7–23)
CALCIUM SERPL-MCNC: 7.8 MG/DL — LOW (ref 8.4–10.5)
CHLORIDE SERPL-SCNC: 106 MMOL/L — SIGNIFICANT CHANGE UP (ref 98–107)
CO2 SERPL-SCNC: 22 MMOL/L — SIGNIFICANT CHANGE UP (ref 22–31)
CREAT SERPL-MCNC: 0.87 MG/DL — SIGNIFICANT CHANGE UP (ref 0.5–1.3)
GLUCOSE SERPL-MCNC: 100 MG/DL — HIGH (ref 70–99)
HCT VFR BLD CALC: 42.9 % — SIGNIFICANT CHANGE UP (ref 39–50)
HGB BLD-MCNC: 13.3 G/DL — SIGNIFICANT CHANGE UP (ref 13–17)
INR BLD: 2.49 — HIGH (ref 0.88–1.17)
MCHC RBC-ENTMCNC: 25 PG — LOW (ref 27–34)
MCHC RBC-ENTMCNC: 31 % — LOW (ref 32–36)
MCV RBC AUTO: 80.8 FL — SIGNIFICANT CHANGE UP (ref 80–100)
PLATELET # BLD AUTO: 278 K/UL — SIGNIFICANT CHANGE UP (ref 150–400)
PMV BLD: 10.5 FL — SIGNIFICANT CHANGE UP (ref 7–13)
POTASSIUM SERPL-MCNC: 3.3 MMOL/L — LOW (ref 3.5–5.3)
POTASSIUM SERPL-SCNC: 3.3 MMOL/L — LOW (ref 3.5–5.3)
PROTHROM AB SERPL-ACNC: 28.4 SEC — HIGH (ref 9.8–13.1)
RBC # BLD: 5.31 M/UL — SIGNIFICANT CHANGE UP (ref 4.2–5.8)
RBC # FLD: 15.7 % — HIGH (ref 10.3–14.5)
SODIUM SERPL-SCNC: 143 MMOL/L — SIGNIFICANT CHANGE UP (ref 135–145)
WBC # BLD: 7.3 K/UL — SIGNIFICANT CHANGE UP (ref 3.8–10.5)
WBC # FLD AUTO: 7.3 K/UL — SIGNIFICANT CHANGE UP (ref 3.8–10.5)

## 2017-06-16 RX ORDER — WARFARIN SODIUM 2.5 MG/1
1 TABLET ORAL
Qty: 0 | Refills: 0 | COMMUNITY
Start: 2017-06-16

## 2017-06-16 RX ORDER — FUROSEMIDE 40 MG
1 TABLET ORAL
Qty: 30 | Refills: 0
Start: 2017-06-16 | End: 2017-07-16

## 2017-06-16 RX ORDER — WARFARIN SODIUM 2.5 MG/1
4 TABLET ORAL ONCE
Qty: 0 | Refills: 0 | Status: DISCONTINUED | OUTPATIENT
Start: 2017-06-16 | End: 2017-06-16

## 2017-06-16 RX ORDER — FUROSEMIDE 40 MG
80 TABLET ORAL DAILY
Qty: 0 | Refills: 0 | Status: DISCONTINUED | OUTPATIENT
Start: 2017-06-16 | End: 2017-06-16

## 2017-06-16 RX ORDER — SPIRONOLACTONE 25 MG/1
25 TABLET, FILM COATED ORAL DAILY
Qty: 0 | Refills: 0 | Status: DISCONTINUED | OUTPATIENT
Start: 2017-06-16 | End: 2017-06-16

## 2017-06-16 RX ORDER — POTASSIUM CHLORIDE 20 MEQ
40 PACKET (EA) ORAL ONCE
Qty: 0 | Refills: 0 | Status: COMPLETED | OUTPATIENT
Start: 2017-06-16 | End: 2017-06-16

## 2017-06-16 RX ORDER — POTASSIUM CHLORIDE 20 MEQ
1 PACKET (EA) ORAL
Qty: 60 | Refills: 0
Start: 2017-06-16 | End: 2017-07-16

## 2017-06-16 RX ORDER — ASPIRIN/CALCIUM CARB/MAGNESIUM 324 MG
1 TABLET ORAL
Qty: 0 | Refills: 0 | DISCHARGE
Start: 2017-06-16

## 2017-06-16 RX ORDER — SPIRONOLACTONE 25 MG/1
1 TABLET, FILM COATED ORAL
Qty: 30 | Refills: 0
Start: 2017-06-16 | End: 2017-07-16

## 2017-06-16 RX ORDER — LISINOPRIL 2.5 MG/1
1 TABLET ORAL
Qty: 0 | Refills: 0 | DISCHARGE
Start: 2017-06-16

## 2017-06-16 RX ORDER — CARVEDILOL PHOSPHATE 80 MG/1
1 CAPSULE, EXTENDED RELEASE ORAL
Qty: 0 | Refills: 0 | DISCHARGE
Start: 2017-06-16

## 2017-06-16 RX ORDER — CLOPIDOGREL BISULFATE 75 MG/1
1 TABLET, FILM COATED ORAL
Qty: 0 | Refills: 0 | DISCHARGE
Start: 2017-06-16

## 2017-06-16 RX ADMIN — SPIRONOLACTONE 25 MILLIGRAM(S): 25 TABLET, FILM COATED ORAL at 11:26

## 2017-06-16 RX ADMIN — Medication 100 MILLIGRAM(S): at 05:31

## 2017-06-16 RX ADMIN — CARVEDILOL PHOSPHATE 6.25 MILLIGRAM(S): 80 CAPSULE, EXTENDED RELEASE ORAL at 05:31

## 2017-06-16 RX ADMIN — Medication 80 MILLIGRAM(S): at 11:26

## 2017-06-16 RX ADMIN — CLOPIDOGREL BISULFATE 75 MILLIGRAM(S): 75 TABLET, FILM COATED ORAL at 11:26

## 2017-06-16 RX ADMIN — LISINOPRIL 40 MILLIGRAM(S): 2.5 TABLET ORAL at 05:31

## 2017-06-16 RX ADMIN — Medication 325 MILLIGRAM(S): at 11:26

## 2017-06-16 RX ADMIN — Medication 81 MILLIGRAM(S): at 11:26

## 2017-06-16 RX ADMIN — Medication 40 MILLIEQUIVALENT(S): at 11:26

## 2017-06-16 RX ADMIN — Medication 40 MILLIGRAM(S): at 05:31

## 2017-06-18 LAB — BACTERIA BLD CULT: SIGNIFICANT CHANGE UP

## 2017-08-24 ENCOUNTER — APPOINTMENT (OUTPATIENT)
Dept: OPHTHALMOLOGY | Facility: CLINIC | Age: 77
End: 2017-08-24

## 2018-06-26 ENCOUNTER — INPATIENT (INPATIENT)
Facility: HOSPITAL | Age: 78
LOS: 1 days | Discharge: HOME | End: 2018-06-28
Attending: INTERNAL MEDICINE | Admitting: INTERNAL MEDICINE
Payer: MEDICARE

## 2018-06-26 VITALS
HEART RATE: 80 BPM | RESPIRATION RATE: 20 BRPM | SYSTOLIC BLOOD PRESSURE: 154 MMHG | TEMPERATURE: 98 F | OXYGEN SATURATION: 100 % | DIASTOLIC BLOOD PRESSURE: 88 MMHG

## 2018-06-26 DIAGNOSIS — I48.0 PAROXYSMAL ATRIAL FIBRILLATION: ICD-10-CM

## 2018-06-26 DIAGNOSIS — D50.9 IRON DEFICIENCY ANEMIA, UNSPECIFIED: ICD-10-CM

## 2018-06-26 DIAGNOSIS — I50.9 HEART FAILURE, UNSPECIFIED: ICD-10-CM

## 2018-06-26 DIAGNOSIS — E78.5 HYPERLIPIDEMIA, UNSPECIFIED: ICD-10-CM

## 2018-06-26 DIAGNOSIS — Z29.9 ENCOUNTER FOR PROPHYLACTIC MEASURES, UNSPECIFIED: ICD-10-CM

## 2018-06-26 DIAGNOSIS — R07.9 CHEST PAIN, UNSPECIFIED: ICD-10-CM

## 2018-06-26 DIAGNOSIS — E11.9 TYPE 2 DIABETES MELLITUS WITHOUT COMPLICATIONS: ICD-10-CM

## 2018-06-26 DIAGNOSIS — Z95.5 PRESENCE OF CORONARY ANGIOPLASTY IMPLANT AND GRAFT: Chronic | ICD-10-CM

## 2018-06-26 DIAGNOSIS — I25.10 ATHEROSCLEROTIC HEART DISEASE OF NATIVE CORONARY ARTERY WITHOUT ANGINA PECTORIS: ICD-10-CM

## 2018-06-26 DIAGNOSIS — Z98.890 OTHER SPECIFIED POSTPROCEDURAL STATES: Chronic | ICD-10-CM

## 2018-06-26 DIAGNOSIS — R07.89 OTHER CHEST PAIN: ICD-10-CM

## 2018-06-26 DIAGNOSIS — I10 ESSENTIAL (PRIMARY) HYPERTENSION: ICD-10-CM

## 2018-06-26 LAB
ALBUMIN SERPL ELPH-MCNC: 3.4 G/DL — SIGNIFICANT CHANGE UP (ref 3.3–5)
ALP SERPL-CCNC: 58 U/L — SIGNIFICANT CHANGE UP (ref 40–120)
ALT FLD-CCNC: 23 U/L — SIGNIFICANT CHANGE UP (ref 4–41)
APTT BLD: 67 SEC — HIGH (ref 27.5–37.4)
AST SERPL-CCNC: 28 U/L — SIGNIFICANT CHANGE UP (ref 4–40)
BASE EXCESS BLDA CALC-SCNC: -10 MMOL/L — SIGNIFICANT CHANGE UP
BASE EXCESS BLDA CALC-SCNC: -2.2 MMOL/L — SIGNIFICANT CHANGE UP
BASOPHILS # BLD AUTO: 0.01 K/UL — SIGNIFICANT CHANGE UP (ref 0–0.2)
BASOPHILS NFR BLD AUTO: 0.1 % — SIGNIFICANT CHANGE UP (ref 0–2)
BILIRUB SERPL-MCNC: 0.3 MG/DL — SIGNIFICANT CHANGE UP (ref 0.2–1.2)
BUN SERPL-MCNC: 7 MG/DL — SIGNIFICANT CHANGE UP (ref 7–23)
CA-I BLDA-SCNC: 1.11 MMOL/L — LOW (ref 1.15–1.29)
CALCIUM SERPL-MCNC: 7.9 MG/DL — LOW (ref 8.4–10.5)
CHLORIDE SERPL-SCNC: 108 MMOL/L — HIGH (ref 98–107)
CO2 SERPL-SCNC: 20 MMOL/L — LOW (ref 22–31)
CREAT SERPL-MCNC: 0.8 MG/DL — SIGNIFICANT CHANGE UP (ref 0.5–1.3)
EOSINOPHIL # BLD AUTO: 0.1 K/UL — SIGNIFICANT CHANGE UP (ref 0–0.5)
EOSINOPHIL NFR BLD AUTO: 1.3 % — SIGNIFICANT CHANGE UP (ref 0–6)
GLUCOSE BLDA-MCNC: 143 MG/DL — HIGH (ref 70–99)
GLUCOSE BLDA-MCNC: 231 MG/DL — HIGH (ref 70–99)
GLUCOSE SERPL-MCNC: 98 MG/DL — SIGNIFICANT CHANGE UP (ref 70–99)
HCO3 BLDA-SCNC: 15 MMOL/L — LOW (ref 22–26)
HCO3 BLDA-SCNC: 23 MMOL/L — SIGNIFICANT CHANGE UP (ref 22–26)
HCT VFR BLD CALC: 33.4 % — LOW (ref 39–50)
HCT VFR BLDA CALC: 37.2 % — LOW (ref 39–51)
HCT VFR BLDA CALC: 37.9 % — LOW (ref 39–51)
HGB BLD-MCNC: 10 G/DL — LOW (ref 13–17)
HGB BLDA-MCNC: 12.1 G/DL — LOW (ref 13–17)
HGB BLDA-MCNC: 12.3 G/DL — LOW (ref 13–17)
IMM GRANULOCYTES # BLD AUTO: 0.01 # — SIGNIFICANT CHANGE UP
IMM GRANULOCYTES NFR BLD AUTO: 0.1 % — SIGNIFICANT CHANGE UP (ref 0–1.5)
INR BLD: 3.37 — HIGH (ref 0.88–1.17)
LACTATE BLDA-SCNC: 1.3 MMOL/L — SIGNIFICANT CHANGE UP (ref 0.5–2)
LYMPHOCYTES # BLD AUTO: 3.69 K/UL — HIGH (ref 1–3.3)
LYMPHOCYTES # BLD AUTO: 46.8 % — HIGH (ref 13–44)
MCHC RBC-ENTMCNC: 24.8 PG — LOW (ref 27–34)
MCHC RBC-ENTMCNC: 29.9 % — LOW (ref 32–36)
MCV RBC AUTO: 82.7 FL — SIGNIFICANT CHANGE UP (ref 80–100)
MONOCYTES # BLD AUTO: 0.78 K/UL — SIGNIFICANT CHANGE UP (ref 0–0.9)
MONOCYTES NFR BLD AUTO: 9.9 % — SIGNIFICANT CHANGE UP (ref 2–14)
NEUTROPHILS # BLD AUTO: 3.29 K/UL — SIGNIFICANT CHANGE UP (ref 1.8–7.4)
NEUTROPHILS NFR BLD AUTO: 41.8 % — LOW (ref 43–77)
NRBC # FLD: 0 — SIGNIFICANT CHANGE UP
NT-PROBNP SERPL-SCNC: 715.7 PG/ML — SIGNIFICANT CHANGE UP
PCO2 BLDA: 32 MMHG — LOW (ref 35–48)
PCO2 BLDA: 70 MMHG — CRITICAL HIGH (ref 35–48)
PH BLDA: 7.06 PH — CRITICAL LOW (ref 7.35–7.45)
PH BLDA: 7.44 PH — SIGNIFICANT CHANGE UP (ref 7.35–7.45)
PLATELET # BLD AUTO: 256 K/UL — SIGNIFICANT CHANGE UP (ref 150–400)
PMV BLD: 10.8 FL — SIGNIFICANT CHANGE UP (ref 7–13)
PO2 BLDA: 117 MMHG — HIGH (ref 83–108)
PO2 BLDA: 222 MMHG — HIGH (ref 83–108)
POTASSIUM BLDA-SCNC: 4.3 MMOL/L — SIGNIFICANT CHANGE UP (ref 3.4–4.5)
POTASSIUM BLDA-SCNC: 4.8 MMOL/L — HIGH (ref 3.4–4.5)
POTASSIUM SERPL-MCNC: 4.8 MMOL/L — SIGNIFICANT CHANGE UP (ref 3.5–5.3)
POTASSIUM SERPL-SCNC: 4.8 MMOL/L — SIGNIFICANT CHANGE UP (ref 3.5–5.3)
PROT SERPL-MCNC: 6.7 G/DL — SIGNIFICANT CHANGE UP (ref 6–8.3)
PROTHROM AB SERPL-ACNC: 38.3 SEC — HIGH (ref 9.8–13.1)
RBC # BLD: 4.04 M/UL — LOW (ref 4.2–5.8)
RBC # FLD: 16.3 % — HIGH (ref 10.3–14.5)
SAO2 % BLDA: 96.4 % — SIGNIFICANT CHANGE UP (ref 95–99)
SAO2 % BLDA: 99.6 % — HIGH (ref 95–99)
SODIUM BLDA-SCNC: 138 MMOL/L — SIGNIFICANT CHANGE UP (ref 136–146)
SODIUM BLDA-SCNC: 141 MMOL/L — SIGNIFICANT CHANGE UP (ref 136–146)
SODIUM SERPL-SCNC: 141 MMOL/L — SIGNIFICANT CHANGE UP (ref 135–145)
TROPONIN T, HIGH SENSITIVITY: 14 NG/L — SIGNIFICANT CHANGE UP (ref ?–14)
TROPONIN T, HIGH SENSITIVITY: 15 NG/L — SIGNIFICANT CHANGE UP (ref ?–14)
WBC # BLD: 7.88 K/UL — SIGNIFICANT CHANGE UP (ref 3.8–10.5)
WBC # FLD AUTO: 7.88 K/UL — SIGNIFICANT CHANGE UP (ref 3.8–10.5)

## 2018-06-26 PROCEDURE — 93016 CV STRESS TEST SUPVJ ONLY: CPT | Mod: GC

## 2018-06-26 PROCEDURE — 78452 HT MUSCLE IMAGE SPECT MULT: CPT | Mod: 26

## 2018-06-26 PROCEDURE — 71045 X-RAY EXAM CHEST 1 VIEW: CPT | Mod: 26,77

## 2018-06-26 PROCEDURE — 93306 TTE W/DOPPLER COMPLETE: CPT | Mod: 26

## 2018-06-26 PROCEDURE — 93018 CV STRESS TEST I&R ONLY: CPT | Mod: GC

## 2018-06-26 PROCEDURE — 71045 X-RAY EXAM CHEST 1 VIEW: CPT | Mod: 26

## 2018-06-26 PROCEDURE — 93010 ELECTROCARDIOGRAM REPORT: CPT

## 2018-06-26 RX ORDER — BUMETANIDE 0.25 MG/ML
4 INJECTION INTRAMUSCULAR; INTRAVENOUS ONCE
Qty: 0 | Refills: 0 | Status: DISCONTINUED | OUTPATIENT
Start: 2018-06-26 | End: 2018-06-26

## 2018-06-26 RX ORDER — BUMETANIDE 0.25 MG/ML
2 INJECTION INTRAMUSCULAR; INTRAVENOUS ONCE
Qty: 0 | Refills: 0 | Status: COMPLETED | OUTPATIENT
Start: 2018-06-26 | End: 2018-06-26

## 2018-06-26 RX ORDER — DEXTROSE 50 % IN WATER 50 %
25 SYRINGE (ML) INTRAVENOUS ONCE
Qty: 0 | Refills: 0 | Status: DISCONTINUED | OUTPATIENT
Start: 2018-06-26 | End: 2018-06-28

## 2018-06-26 RX ORDER — FUROSEMIDE 40 MG
60 TABLET ORAL ONCE
Qty: 0 | Refills: 0 | Status: DISCONTINUED | OUTPATIENT
Start: 2018-06-26 | End: 2018-06-26

## 2018-06-26 RX ORDER — INSULIN LISPRO 100/ML
VIAL (ML) SUBCUTANEOUS
Qty: 0 | Refills: 0 | Status: DISCONTINUED | OUTPATIENT
Start: 2018-06-26 | End: 2018-06-28

## 2018-06-26 RX ORDER — DEXTROSE 50 % IN WATER 50 %
15 SYRINGE (ML) INTRAVENOUS ONCE
Qty: 0 | Refills: 0 | Status: DISCONTINUED | OUTPATIENT
Start: 2018-06-26 | End: 2018-06-28

## 2018-06-26 RX ORDER — GLUCAGON INJECTION, SOLUTION 0.5 MG/.1ML
1 INJECTION, SOLUTION SUBCUTANEOUS ONCE
Qty: 0 | Refills: 0 | Status: DISCONTINUED | OUTPATIENT
Start: 2018-06-26 | End: 2018-06-28

## 2018-06-26 RX ORDER — INSULIN LISPRO 100/ML
VIAL (ML) SUBCUTANEOUS AT BEDTIME
Qty: 0 | Refills: 0 | Status: DISCONTINUED | OUTPATIENT
Start: 2018-06-26 | End: 2018-06-28

## 2018-06-26 RX ORDER — FERROUS SULFATE 325(65) MG
325 TABLET ORAL DAILY
Qty: 0 | Refills: 0 | Status: DISCONTINUED | OUTPATIENT
Start: 2018-06-26 | End: 2018-06-28

## 2018-06-26 RX ORDER — NITROGLYCERIN 6.5 MG
5 CAPSULE, EXTENDED RELEASE ORAL
Qty: 50 | Refills: 0 | Status: DISCONTINUED | OUTPATIENT
Start: 2018-06-26 | End: 2018-06-26

## 2018-06-26 RX ORDER — SPIRONOLACTONE 25 MG/1
25 TABLET, FILM COATED ORAL DAILY
Qty: 0 | Refills: 0 | Status: DISCONTINUED | OUTPATIENT
Start: 2018-06-26 | End: 2018-06-28

## 2018-06-26 RX ORDER — ISOSORBIDE MONONITRATE 60 MG/1
30 TABLET, EXTENDED RELEASE ORAL DAILY
Qty: 0 | Refills: 0 | Status: DISCONTINUED | OUTPATIENT
Start: 2018-06-26 | End: 2018-06-28

## 2018-06-26 RX ORDER — HYDRALAZINE HCL 50 MG
10 TABLET ORAL ONCE
Qty: 0 | Refills: 0 | Status: DISCONTINUED | OUTPATIENT
Start: 2018-06-26 | End: 2018-06-26

## 2018-06-26 RX ORDER — FUROSEMIDE 40 MG
80 TABLET ORAL DAILY
Qty: 0 | Refills: 0 | Status: DISCONTINUED | OUTPATIENT
Start: 2018-06-26 | End: 2018-06-26

## 2018-06-26 RX ORDER — DEXTROSE 50 % IN WATER 50 %
12.5 SYRINGE (ML) INTRAVENOUS ONCE
Qty: 0 | Refills: 0 | Status: DISCONTINUED | OUTPATIENT
Start: 2018-06-26 | End: 2018-06-28

## 2018-06-26 RX ORDER — ASPIRIN/CALCIUM CARB/MAGNESIUM 324 MG
81 TABLET ORAL DAILY
Qty: 0 | Refills: 0 | Status: DISCONTINUED | OUTPATIENT
Start: 2018-06-27 | End: 2018-06-28

## 2018-06-26 RX ORDER — ATORVASTATIN CALCIUM 80 MG/1
80 TABLET, FILM COATED ORAL AT BEDTIME
Qty: 0 | Refills: 0 | Status: DISCONTINUED | OUTPATIENT
Start: 2018-06-26 | End: 2018-06-28

## 2018-06-26 RX ORDER — CARVEDILOL PHOSPHATE 80 MG/1
6.25 CAPSULE, EXTENDED RELEASE ORAL EVERY 12 HOURS
Qty: 0 | Refills: 0 | Status: DISCONTINUED | OUTPATIENT
Start: 2018-06-26 | End: 2018-06-28

## 2018-06-26 RX ORDER — ASPIRIN/CALCIUM CARB/MAGNESIUM 324 MG
162 TABLET ORAL ONCE
Qty: 0 | Refills: 0 | Status: COMPLETED | OUTPATIENT
Start: 2018-06-26 | End: 2018-06-26

## 2018-06-26 RX ORDER — SODIUM CHLORIDE 9 MG/ML
1000 INJECTION, SOLUTION INTRAVENOUS
Qty: 0 | Refills: 0 | Status: DISCONTINUED | OUTPATIENT
Start: 2018-06-26 | End: 2018-06-26

## 2018-06-26 RX ORDER — CLOPIDOGREL BISULFATE 75 MG/1
75 TABLET, FILM COATED ORAL DAILY
Qty: 0 | Refills: 0 | Status: DISCONTINUED | OUTPATIENT
Start: 2018-06-26 | End: 2018-06-28

## 2018-06-26 RX ORDER — LISINOPRIL 2.5 MG/1
40 TABLET ORAL DAILY
Qty: 0 | Refills: 0 | Status: DISCONTINUED | OUTPATIENT
Start: 2018-06-26 | End: 2018-06-28

## 2018-06-26 RX ORDER — BUMETANIDE 0.25 MG/ML
1 INJECTION INTRAMUSCULAR; INTRAVENOUS
Qty: 10 | Refills: 0 | Status: DISCONTINUED | OUTPATIENT
Start: 2018-06-26 | End: 2018-06-27

## 2018-06-26 RX ADMIN — ISOSORBIDE MONONITRATE 30 MILLIGRAM(S): 60 TABLET, EXTENDED RELEASE ORAL at 10:35

## 2018-06-26 RX ADMIN — SPIRONOLACTONE 25 MILLIGRAM(S): 25 TABLET, FILM COATED ORAL at 10:35

## 2018-06-26 RX ADMIN — LISINOPRIL 40 MILLIGRAM(S): 2.5 TABLET ORAL at 10:35

## 2018-06-26 RX ADMIN — BUMETANIDE 2 MILLIGRAM(S): 0.25 INJECTION INTRAMUSCULAR; INTRAVENOUS at 20:43

## 2018-06-26 RX ADMIN — CLOPIDOGREL BISULFATE 75 MILLIGRAM(S): 75 TABLET, FILM COATED ORAL at 10:35

## 2018-06-26 RX ADMIN — ATORVASTATIN CALCIUM 80 MILLIGRAM(S): 80 TABLET, FILM COATED ORAL at 22:25

## 2018-06-26 RX ADMIN — BUMETANIDE 10 MG/HR: 0.25 INJECTION INTRAMUSCULAR; INTRAVENOUS at 20:46

## 2018-06-26 RX ADMIN — Medication 162 MILLIGRAM(S): at 04:22

## 2018-06-26 RX ADMIN — Medication 325 MILLIGRAM(S): at 10:35

## 2018-06-26 RX ADMIN — CARVEDILOL PHOSPHATE 6.25 MILLIGRAM(S): 80 CAPSULE, EXTENDED RELEASE ORAL at 17:21

## 2018-06-26 NOTE — H&P ADULT - PROBLEM SELECTOR PLAN 3
Monitor on telemetry, no signs of ischemia on EKG  Cardiac enzymes x 2 negative  Pharm NST ordered  Echocardiogram ordered  Continue ASA, Plavix, Lipitor, Coreg, Lisinopril  DASH diet

## 2018-06-26 NOTE — CHART NOTE - NSCHARTNOTEFT_GEN_A_CORE
Called by RN For patient hypertensive to 197/115 and SOB with belly breathing. Advised to call RRT. during RRT patient recieived Lasix 80 IV, Nitro SL, and was started on BiPAP. Patients breathing and mental status improved after treatment. CCU called and accepted to CCU for further care and monitoring.  Will follow and update Dr. Lau.

## 2018-06-26 NOTE — H&P ADULT - NEGATIVE NEUROLOGICAL SYMPTOMS
no tremors/no vertigo/no loss of sensation/no syncope/no generalized seizures/no difficulty walking/no headache/no confusion/no transient paralysis/no weakness/no focal seizures/no paresthesias/no loss of consciousness/no hemiparesis

## 2018-06-26 NOTE — CHART NOTE - NSCHARTNOTEFT_GEN_A_CORE
Reason for CCU Consult: shortness of breath, new bipap    HISTORY OF PRESENT ILLNESS:        Allergies  No Known Allergies/ Intolerances        PAST MEDICAL & SURGICAL HISTORY:  Chronic diastolic congestive heart failure, NYHA class 3  PAF (paroxysmal atrial fibrillation)  KIMMY (iron deficiency anemia)  DM (diabetes mellitus)  HLD (hyperlipidemia)  HTN (hypertension)  CHF (congestive heart failure)  CAD (coronary artery disease)  S/P coronary artery stent placement: 2x in 4/17 in [place]  S/P hernia repair: Lt      MEDICATIONS  (STANDING):  aspirin enteric coated 81 milliGRAM(s) Oral daily  atorvastatin 80 milliGRAM(s) Oral at bedtime  buMETAnide Infusion 1 mG/Hr (10 mL/Hr) IV Continuous <Continuous>  buMETAnide Injectable 4 milliGRAM(s) IV Push once  carvedilol 6.25 milliGRAM(s) Oral every 12 hours  clopidogrel Tablet 75 milliGRAM(s) Oral daily  dextrose 5%. 1000 milliLiter(s) (50 mL/Hr) IV Continuous <Continuous>  dextrose 50% Injectable 12.5 Gram(s) IV Push once  dextrose 50% Injectable 25 Gram(s) IV Push once  dextrose 50% Injectable 25 Gram(s) IV Push once  ferrous    sulfate 325 milliGRAM(s) Oral daily  furosemide    Tablet 80 milliGRAM(s) Oral daily  insulin lispro (HumaLOG) corrective regimen sliding scale   SubCutaneous three times a day before meals  insulin lispro (HumaLOG) corrective regimen sliding scale   SubCutaneous at bedtime  isosorbide   mononitrate ER Tablet (IMDUR) 30 milliGRAM(s) Oral daily  lisinopril 40 milliGRAM(s) Oral daily  spironolactone 25 milliGRAM(s) Oral daily    MEDICATIONS  (PRN):  dextrose 40% Gel 15 Gram(s) Oral once PRN Blood Glucose LESS THAN 70 milliGRAM(s)/deciliter  glucagon  Injectable 1 milliGRAM(s) IntraMuscular once PRN Glucose LESS THAN 70 milligrams/deciliter      Drug Dosing Weight  Height (cm): 170.18 (26 Jun 2018 10:01)  Weight (kg): 71.2 (26 Jun 2018 10:01)  BMI (kg/m2): 24.6 (26 Jun 2018 10:01)  BSA (m2): 1.82 (26 Jun 2018 10:01)    FAMILY HISTORY:  No pertinent family history in first degree relatives      SOCIAL HISTORY:  Smoker[ ]  Non smoker[ ]  Alcohol [ ]      ADVANCE DIRECTIVES:    REVIEW OF SYSTEMS:    CONSTITUTIONAL: No fevers, No chills, No fatigue, No weight gain  EYES: No vision changes   ENT: No congestion, No ear pain, No sore throat.  NECK: No pain, No stiffness  RESPIRATORY: No shortness of breath, No cough, No wheezing, No hemoptysis  CARDIOVASCULAR: No chest pain. No palpitations, No LOBO, No orthopnea, No paroxysmal nocturnal dyspnea, No pleuritic pain  GASTROINTESTINAL: No abdominal pain, No nausea, No vomiting, No hematemesis, No diarrhea No constipation. No melena  GENITOURINARY: No dysuria, No frequency, No incontinence, No hematuria  NEUROLOGICAL: No dizziness, No lightheadedness, No syncope, No LOC, No headache, No numbness or weakness  EXTREMITIES: No Edema, No joint pain, No joint swelling.  PSYCHIATRIC: No anxiety, No depression  SKIN: No diaphoresis. No itching, No rashes, No pressure ulcers    All other review of systems is negative unless indicated above.    PHYSICAL EXAM:    Appearance: NAD, no distress  HEENT:   Normal oral mucosa, PERRL, EOMI  Cardiovascular: Regular rate and rhythm, Normal S1 S2, No JVD, No murmurs, No edema  Respiratory: Lungs clear to auscultation. No rales, No rhonchi, No wheezing. No tenderness to palpation  Gastrointestinal:  Soft, Non-tender, + BS  Neurologic: Non-focal, A&Ox3  Skin: Warm and dry, No rashes, No ecchymoses, No cyanosis  Extremities: No clubbing, cyanosis or edema  Vascular: Peripheral pulses palpable 2+ bilaterally  Psychiatry: Mood & affect appropriate      	    		            ICU Vital Signs Last 24 Hrs  T(C): 36.7 (26 Jun 2018 15:00), Max: 36.7 (26 Jun 2018 03:33)  T(F): 98 (26 Jun 2018 15:00), Max: 98 (26 Jun 2018 03:33)  HR: 98 (26 Jun 2018 18:58) (69 - 106)  BP: 138/71 (26 Jun 2018 18:58) (138/71 - 220/116)  BP(mean): --  ABP: --  ABP(mean): --  RR: 30 (26 Jun 2018 18:58) (16 - 30)  SpO2: 100% (26 Jun 2018 18:58) (92% - 100%)      ABG - ( 26 Jun 2018 18:20 )  pH, Arterial: 7.06  pH, Blood: x     /  pCO2: 70    /  pO2: 117   / HCO3: 15    / Base Excess: -10.0 /  SaO2: 96.4                LABS:  CBC Full  -  ( 26 Jun 2018 04:20 )  WBC Count : 7.88 K/uL  Hemoglobin : 10.0 g/dL  Hematocrit : 33.4 %  Platelet Count - Automated : 256 K/uL  Mean Cell Volume : 82.7 fL  Mean Cell Hemoglobin : 24.8 pg  Mean Cell Hemoglobin Concentration : 29.9 %  Auto Neutrophil # : 3.29 K/uL  Auto Lymphocyte # : 3.69 K/uL  Auto Monocyte # : 0.78 K/uL  Auto Eosinophil # : 0.10 K/uL  Auto Basophil # : 0.01 K/uL  Auto Neutrophil % : 41.8 %  Auto Lymphocyte % : 46.8 %  Auto Monocyte % : 9.9 %  Auto Eosinophil % : 1.3 %  Auto Basophil % : 0.1 %    06-26    141  |  108<H>  |  7   ----------------------------<  98  4.8   |  20<L>  |  0.80    Ca    7.9<L>      26 Jun 2018 07:25    TPro  6.7  /  Alb  3.4  /  TBili  0.3  /  DBili  x   /  AST  28  /  ALT  23  /  AlkPhos  58  06-26        CAPILLARY BLOOD GLUCOSE      POCT Blood Glucose.: 117 mg/dL (26 Jun 2018 17:30)    PT/INR - ( 26 Jun 2018 04:20 )   PT: 38.3 SEC;   INR: 3.37          PTT - ( 26 Jun 2018 04:20 )  PTT:67.0 SEC      I&O's Detail    26 Jun 2018 07:01  -  26 Jun 2018 19:37  --------------------------------------------------------  IN:  Total IN: 0 mL    OUT:    Indwelling Catheter - Urethral: 250 mL  Total OUT: 250 mL    Total NET: -250 mL          EKG:    ECHO      RADIOLOGY STUDIES    CXR:     CT SCAN:     ULTRASOUND:     ASSESSMENT      PLAN          Case discussed with Cardiology fellow

## 2018-06-26 NOTE — ED PROVIDER NOTE - OBJECTIVE STATEMENT
patient complaining of chest pain and right arm pain that woke him up from sleep.  associated with shortness of breath. resolved after taking 2 nitro.  chest pain free since.  +h/o cad with stents. reports adherence with medications.  denies active bleeding.

## 2018-06-26 NOTE — H&P ADULT - NEGATIVE OPHTHALMOLOGIC SYMPTOMS
no blurred vision R/no blurred vision L/no pain R/no loss of vision L/no diplopia/no loss of vision R/no pain L/no photophobia

## 2018-06-26 NOTE — H&P ADULT - PROBLEM SELECTOR PLAN 8
Hg slightly lower than last admission in the setting of non-compliance with ferrous sulfate  Monitor CBC  Resume ferrous sulfate

## 2018-06-26 NOTE — H&P ADULT - NEGATIVE GASTROINTESTINAL SYMPTOMS
no constipation/no abdominal pain/no vomiting/no change in bowel habits/no melena/no hematochezia/no nausea/no diarrhea/no flatulence

## 2018-06-26 NOTE — H&P ADULT - PROBLEM SELECTOR PLAN 4
Monitor on telemetry, currently NSR on tele  Continue Coreg for rate control  Dose Coumadin for INR goal 2.0-3.0 (hold today given INR 3.37)  CHADS score 4

## 2018-06-26 NOTE — H&P ADULT - RS GEN PE MLT RESP DETAILS PC
airway patent/breath sounds equal/no rhonchi/no wheezes/clear to auscultation bilaterally/respirations non-labored/good air movement/no chest wall tenderness/no intercostal retractions

## 2018-06-26 NOTE — ED ADULT NURSE NOTE - OBJECTIVE STATEMENT
rec'd pt. a&ox3, with hx of HTN/DM/KIMMY/CHF/afib and cardiac stents, came in c/o palpitations and chest pressure upon waking up around 2:30am today. pt. also reports had episode of SOB, no n/v nor episode of diaphoresis. pt. took 3 NTG at home PTA with relief. pt. currently denies any palpitations nor chest pain at this time. hooked on cardiac monitor noted to be on NSR with some PVCs. MD seen pt. labs drawn and sent. awaits further eval. will continue to monitor

## 2018-06-26 NOTE — ED ADULT NURSE REASSESSMENT NOTE - NS ED NURSE REASSESS COMMENT FT1
received report from BRIGITTE Coulter, pt A&Ox3 denies cp sob at this time nsr on monitor pending bed assignment,

## 2018-06-26 NOTE — CONSULT NOTE ADULT - SUBJECTIVE AND OBJECTIVE BOX
77 yo man w/ hx of CAD s/p stent (RASHEEDA to RCA/OM1 4/2017), ischemic cardiomyopathy with mild LV dysfunction (EF 45%), PAF on Coumadin, HTN, HLD, DM, who presented for atypical chest pain, dyspnea, orthopnea.  Patient admitted to the general medicine service and underwent TTE w/ nuc stress test; found to have non-reversible severe defects in the infero-inferolateral walls as well as severe MR.  A few hours ago, BP shot up to the 190s/110s and developed worsening resp distress requiring BIPAP.  CCU consulted for further management.        PMH:   Chronic diastolic congestive heart failure, NYHA class 3  PAF (paroxysmal atrial fibrillation)  KIMMY (iron deficiency anemia)  DM (diabetes mellitus)  HLD (hyperlipidemia)  HTN (hypertension)  Diabetes  CHF (congestive heart failure)  CAD (coronary artery disease)      PSH:   S/P coronary artery stent placement  S/P hernia repair  H/O hernia repair      Medications:   aspirin enteric coated 81 milliGRAM(s) Oral daily  atorvastatin 80 milliGRAM(s) Oral at bedtime  buMETAnide Infusion 1 mG/Hr IV Continuous <Continuous>  buMETAnide Injectable 4 milliGRAM(s) IV Push once  carvedilol 6.25 milliGRAM(s) Oral every 12 hours  clopidogrel Tablet 75 milliGRAM(s) Oral daily  dextrose 40% Gel 15 Gram(s) Oral once PRN  dextrose 5%. 1000 milliLiter(s) IV Continuous <Continuous>  dextrose 50% Injectable 12.5 Gram(s) IV Push once  dextrose 50% Injectable 25 Gram(s) IV Push once  dextrose 50% Injectable 25 Gram(s) IV Push once  ferrous    sulfate 325 milliGRAM(s) Oral daily  furosemide    Tablet 80 milliGRAM(s) Oral daily  glucagon  Injectable 1 milliGRAM(s) IntraMuscular once PRN  insulin lispro (HumaLOG) corrective regimen sliding scale   SubCutaneous three times a day before meals  insulin lispro (HumaLOG) corrective regimen sliding scale   SubCutaneous at bedtime  isosorbide   mononitrate ER Tablet (IMDUR) 30 milliGRAM(s) Oral daily  lisinopril 40 milliGRAM(s) Oral daily  spironolactone 25 milliGRAM(s) Oral daily      Allergies:  No Known Allergies      FAMILY HISTORY:  No pertinent family history in first degree relatives      Social History:  Smoking History:  Alcohol Use:  Drug Use:    Review of Systems:  REVIEW OF SYSTEMS:    CONSTITUTIONAL: No weakness, fevers or chills  EYES/ENT: No visual changes;  No dysphagia  NECK: No pain or stiffness  RESPIRATORY: +shortness of breath, orthopnea, PND   CARDIOVASCULAR: No chest pain or palpitations; No lower extremity edema  GASTROINTESTINAL: No abdominal or epigastric pain. No nausea, vomiting, or hematemesis; No diarrhea or constipation. No melena or hematochezia.  BACK: No back pain  GENITOURINARY: No dysuria, frequency or hematuria  NEUROLOGICAL: No numbness or weakness  SKIN: No itching, burning, rashes, or lesions   All other review of systems is negative unless indicated above.    Physical Exam:  T(F): 98 (06-26), Max: 98 (06-26)  HR: 98 (06-26) (69 - 106)  BP: 138/71 (06-26) (138/71 - 220/116)  RR: 30 (06-26)  SpO2: 100% (06-26)    GENERAL: No acute distress, well-developed  HEAD:  Atraumatic, Normocephalic  ENT: EOMI, PERRLA, conjunctiva and sclera clear, Neck supple, +JVP 12 cm H20   CHEST/LUNG: Diffuse crackles w/ no wheezing/rhonchi   BACK: No spinal tenderness  HEART: Regular rate and rhythm; III/VI holosystolic murmur   ABDOMEN: Soft, Nontender, Nondistended; Bowel sounds present  EXTREMITIES:  No clubbing, cyanosis, or edema  PSYCH: Nl behavior, nl affect  NEUROLOGY: AAOx3, non-focal, cranial nerves intact  SKIN: Normal color, No rashes or lesions    ECG: sinus rhythm, Q waves inferiorly, no acute ischemic changes     Echo:      < from: Transthoracic Echocardiogram (06.26.18 @ 09:37) >  1. Tethered mitral valve leaflets with normal opening.  Severe mitral regurgitation.  2. Mild segmental left ventricular systolic dysfunction.  The inferior wall is hypokinetic.  3. Moderate diastolic dysfunction (Stage II).  4. Normal right ventricular size and function.    < end of copied text >      Stress Testing:    Cath:    Interpretation of Telemetry:    Imaging:    Labs: Personally reviewed                        10.0   7.88  )-----------( 256      ( 26 Jun 2018 04:20 )             33.4     06-26    141  |  108<H>  |  7   ----------------------------<  98  4.8   |  20<L>  |  0.80    Ca    7.9<L>      26 Jun 2018 07:25    TPro  6.7  /  Alb  3.4  /  TBili  0.3  /  DBili  x   /  AST  28  /  ALT  23  /  AlkPhos  58  06-26    PT/INR - ( 26 Jun 2018 04:20 )   PT: 38.3 SEC;   INR: 3.37          PTT - ( 26 Jun 2018 04:20 )  PTT:67.0 SEC      Serum Pro-Brain Natriuretic Peptide: 715.7 pg/mL (06-26 @ 04:20)    Assessment     77 yo man w/ hx of CAD s/p stent (RASHEEDA to RCA/OM1 4/2017), ischemic cardiomyopathy with mild LV dysfunction (EF 45%), PAF on Coumadin, HTN, HLD, DM, who presented for ADHF in the setting of severe MR.  Likely flash pulmonary edema in the setting of HTN emergency.  Received IV lasix x 1 dose w /poor response.     RECS  - admit to CCU   - give IV bumex 4 mg x 1 dose; start bumex GTT at 1 mg/hr   - afterload reduce w/ nitro GTT   - cont BIPAP; repeat ABG in 1 hour     Monitor closely in the CCU     ROSALVA SO

## 2018-06-26 NOTE — H&P ADULT - PROBLEM SELECTOR PLAN 7
Start insulin sliding scale  Hold oral hypoglycemics  Check HgA1C  Monitor finger sticks  Diabetic diet

## 2018-06-26 NOTE — ED ADULT TRIAGE NOTE - CHIEF COMPLAINT QUOTE
Pt. had right sided  chest pain that started at 0230. Woke the pt. out of sleep. Took  NTG, pain is relieved now. Pt. has history of cardiac stents March 2017. Looks well at triage.

## 2018-06-26 NOTE — H&P ADULT - PROBLEM SELECTOR PLAN 1
Admit to telemetry, no signs of ischemia on EKG  Cardiac enzymes x 2 negative  Pharm NST ordered  Echocardiogram ordered  Continue ASA, Plavix, Lipitor, Coreg, Lisinopril  DASH diet  F/U MD note

## 2018-06-26 NOTE — ED ADULT NURSE REASSESSMENT NOTE - NS ED NURSE REASSESS COMMENT FT1
pt. a&ox3, in NAD at this time, chest pain comes and goes, no pain at this time. pt. admitted, awaits bed. will continue to monitor

## 2018-06-26 NOTE — CHART NOTE - NSCHARTNOTEFT_GEN_A_CORE
MAR RRT Note    CC: RRT called for pt with hypertension and dyspnea  HPI: 75 yo man w/ hx of CAD s/p stent (RASHEEDA to RCA/OM1 4/2017), ischemic cardiomyopathy with mild LV dysfunction (EF 45%), PAF on Coumadin, HTN, HLD, DM initially presented with chest pain and LOBO. Had stress test today that was abnormal. TTE showed severe MR. Pt was reportedly feeling well and breathing on room air earlier in the day. RRT called when BP found to be 220/120 and pt w/ respiratory rate of 30's and hypoxia. Upon arriving to patient he was very tachypneic and lethargic, unable to give complaints. There was difficulty measuring spO2. BP was found to be 240/140. Pt was given sublingual nitro tab and placed on 100% NRB and eventually bipap. Pt was also given lasix 80 mg. Eventually able to get  pulse ox that showed 100% on bipap at 14/7 and 100%. Mental status improved and BP improved with nitro to 150/90.     REVIEW OF SYSTEMS:  unable to obtain given respiratory status.    VITALS:  T(C): 36.4 (06-26-18 @ 19:44), Max: 36.7 (06-26-18 @ 03:33)  HR: 94 (06-26-18 @ 19:44) (69 - 106)  BP: 110/76 (06-26-18 @ 19:44) (110/76 - 220/116)  RR: 30 (06-26-18 @ 19:44) (16 - 30)  SpO2: 100% (06-26-18 @ 19:44) (92% - 100%)  Wt(kg): --    PHYSICAL EXAM:  General: severe distress, tachypnea, lethargy  Eyes: PERRLA, EOMI. Conjunctiva and sclera clear.  Lungs:diffuse rales  Heart:  sinus tachycardia, +systlic mumur.   Abdomen: Soft, NT/ND. Normoactive BS x 4 quadrants.  Neurological:  unable to answer questions. moved all extremities on own.   Extremities:  2+ B/L peripheral pulses. No clubbing, cyanosis, or edema.    LABS:  CAPILLARY BLOOD GLUCOSE      POCT Blood Glucose.: 117 mg/dL (26 Jun 2018 17:30)                          10.0   7.88  )-----------( 256      ( 26 Jun 2018 04:20 )             33.4     06-26    141  |  108<H>  |  7   ----------------------------<  98  4.8   |  20<L>  |  0.80    Ca    7.9<L>      26 Jun 2018 07:25    TPro  6.7  /  Alb  3.4  /  TBili  0.3  /  DBili  x   /  AST  28  /  ALT  23  /  AlkPhos  58  06-26        PT/INR - ( 26 Jun 2018 04:20 )   PT: 38.3 SEC;   INR: 3.37          PTT - ( 26 Jun 2018 04:20 )  PTT:67.0 SEC  LIVER FUNCTIONS - ( 26 Jun 2018 07:25 )  Alb: 3.4 g/dL / Pro: 6.7 g/dL / ALK PHOS: 58 u/L / ALT: 23 u/L / AST: 28 u/L / GGT: x              CXR: flash pulm edema.                          ASSESSMENT:  75 yo man w/ hx of CAD s/p stent (RASHEEDA to RCA/OM1 4/2017), ischemic cardiomyopathy with mild LV dysfunction (EF 45%), PAF on Coumadin, HTN, HLD, DM, who presented for ADHF in the setting of severe MR.  Likely flash pulmonary edema in the setting of HTN emergency.    PLAN:  - continue patient on bipap, CCU consulted, follow up recs  -place garcia and keep on strict I's and O's and daily waits  -keep pt on good afterload reduction, consider standing hydralazine and isordil  -continue telemetry and pulse ox monitoring  -f/u labs and recs as per primary care team.   - Will continue to follow up      Russell Ku MD  Internal Medicine, PGY-3  [facility]  [phone]

## 2018-06-26 NOTE — H&P ADULT - ASSESSMENT
75 y/o male with a PMHx of CAD S/P stent placement to mRCA and OM1 (at [facility] in April 2017), ischemic cardiomyopathy with mild LV dysfunction (EF 45%), grade III diastolic dysfunction, PAF on Coumadin, HTN, HLD, DM, and KIMMY presents to ED with chest pain and dyspnea on exertion.

## 2018-06-26 NOTE — H&P ADULT - PROBLEM SELECTOR PLAN 2
Pt appears euvolemic on exam with no signs of fluid overload (no JVD, rales or pitting edema, CXR clear and proBNP unimpressive)  Continue Lasix, Spironolactone, Lisinopril, Coreg  Strict I&Os, monitor daily weights, 1500 cc fluid restriction, sodium restriction

## 2018-06-26 NOTE — H&P ADULT - HISTORY OF PRESENT ILLNESS
75 y/o male with a PMHx of CAD S/P stent placement to mRCA and OM1 (at [facility] in April 2017), ischemic cardiomyopathy with mild LV dysfunction (EF 45%), grade III diastolic dysfunction, PAF on Coumadin, HTN, HLD, DM, and KIMMY presents to ED with chest pain and dyspnea on exertion for the past week. Pt reports that he has been experiencing intermittent, non-pleuritic, non-exertional, non-radiating and non-reproducible right sided chest pain described as a cramping sensation by his armpit. Pt admits that his chest pain is made worse when he lifts his right arm. Pt believes something is wrong with his "stent." Pt woke up this morning with chest pain at 2AM and took a sublingual nitro and his pain resolved but he wanted to get checked out. Pt also admits to decreased exercise tolerance and dyspnea on exertion over the past week. Pt normally ambulates for a couple miles but now has shortness of breath after a couple of blocks. Pt denies fever, chills, recent travel, headache, dizziness, visual deficits, orthopnea, paroxysmal nocturnal dyspnea, palpitations, abdominal pain, N/V/D/C, hematochezia, melena, dysuria, hematuria, LOC, syncope, peripheral edema. Upon arrival to ED, EKG: NSR at 73 bpm with PVCs, LAE, IRBBB, TWI V6. CE x2: Trop 15-->14. H/H: 10.0/33.4. ProBNP: 715.7. CXR: No acute pulmonary disease. Pt is now admitted to telemetry.

## 2018-06-26 NOTE — H&P ADULT - NSHPLABSRESULTS_GEN_ALL_CORE
EKG: NSR at 73 bpm with PVCs, LAE, IRBBB, TWI V6  CE x2: Trop 15-->14  H/H: 10.0/33.4  ProBNP: 715.7    6/26 CXR: No acute pulmonary disease.

## 2018-06-26 NOTE — ED PROVIDER NOTE - PROGRESS NOTE DETAILS
cards: Dr. Salazar ([facility]) not affiliated, page tele doc d/w Dr. Scott, text page telepa

## 2018-06-27 DIAGNOSIS — E78.5 HYPERLIPIDEMIA, UNSPECIFIED: ICD-10-CM

## 2018-06-27 DIAGNOSIS — J81.0 ACUTE PULMONARY EDEMA: ICD-10-CM

## 2018-06-27 DIAGNOSIS — E11.8 TYPE 2 DIABETES MELLITUS WITH UNSPECIFIED COMPLICATIONS: ICD-10-CM

## 2018-06-27 DIAGNOSIS — I34.0 NONRHEUMATIC MITRAL (VALVE) INSUFFICIENCY: ICD-10-CM

## 2018-06-27 DIAGNOSIS — I16.1 HYPERTENSIVE EMERGENCY: ICD-10-CM

## 2018-06-27 DIAGNOSIS — I10 ESSENTIAL (PRIMARY) HYPERTENSION: ICD-10-CM

## 2018-06-27 LAB
ALBUMIN SERPL ELPH-MCNC: 4 G/DL — SIGNIFICANT CHANGE UP (ref 3.3–5)
ALP SERPL-CCNC: 80 U/L — SIGNIFICANT CHANGE UP (ref 40–120)
ALT FLD-CCNC: 29 U/L — SIGNIFICANT CHANGE UP (ref 4–41)
ANISOCYTOSIS BLD QL: SLIGHT — SIGNIFICANT CHANGE UP
AST SERPL-CCNC: 26 U/L — SIGNIFICANT CHANGE UP (ref 4–40)
BASOPHILS # BLD AUTO: 0.02 K/UL — SIGNIFICANT CHANGE UP (ref 0–0.2)
BASOPHILS NFR BLD AUTO: 0.1 % — SIGNIFICANT CHANGE UP (ref 0–2)
BASOPHILS NFR SPEC: 0 % — SIGNIFICANT CHANGE UP (ref 0–2)
BILIRUB SERPL-MCNC: 0.5 MG/DL — SIGNIFICANT CHANGE UP (ref 0.2–1.2)
BLASTS # FLD: 0 % — SIGNIFICANT CHANGE UP (ref 0–0)
BUN SERPL-MCNC: 13 MG/DL — SIGNIFICANT CHANGE UP (ref 7–23)
BUN SERPL-MCNC: 14 MG/DL — SIGNIFICANT CHANGE UP (ref 7–23)
BURR CELLS BLD QL SMEAR: PRESENT — SIGNIFICANT CHANGE UP
CALCIUM SERPL-MCNC: 7.8 MG/DL — LOW (ref 8.4–10.5)
CALCIUM SERPL-MCNC: 8.3 MG/DL — LOW (ref 8.4–10.5)
CHLORIDE SERPL-SCNC: 105 MMOL/L — SIGNIFICANT CHANGE UP (ref 98–107)
CHLORIDE SERPL-SCNC: 105 MMOL/L — SIGNIFICANT CHANGE UP (ref 98–107)
CHOLEST SERPL-MCNC: 159 MG/DL — SIGNIFICANT CHANGE UP (ref 120–199)
CO2 SERPL-SCNC: 21 MMOL/L — LOW (ref 22–31)
CO2 SERPL-SCNC: 24 MMOL/L — SIGNIFICANT CHANGE UP (ref 22–31)
CREAT SERPL-MCNC: 0.87 MG/DL — SIGNIFICANT CHANGE UP (ref 0.5–1.3)
CREAT SERPL-MCNC: 1 MG/DL — SIGNIFICANT CHANGE UP (ref 0.5–1.3)
ELLIPTOCYTES BLD QL SMEAR: SLIGHT — SIGNIFICANT CHANGE UP
EOSINOPHIL # BLD AUTO: 0.02 K/UL — SIGNIFICANT CHANGE UP (ref 0–0.5)
EOSINOPHIL NFR BLD AUTO: 0.1 % — SIGNIFICANT CHANGE UP (ref 0–6)
EOSINOPHIL NFR FLD: 0.9 % — SIGNIFICANT CHANGE UP (ref 0–6)
GIANT PLATELETS BLD QL SMEAR: PRESENT — SIGNIFICANT CHANGE UP
GLUCOSE SERPL-MCNC: 136 MG/DL — HIGH (ref 70–99)
GLUCOSE SERPL-MCNC: 140 MG/DL — HIGH (ref 70–99)
HBA1C BLD-MCNC: 6.5 % — HIGH (ref 4–5.6)
HCT VFR BLD CALC: 36.5 % — LOW (ref 39–50)
HCT VFR BLD CALC: 38 % — LOW (ref 39–50)
HDLC SERPL-MCNC: 57 MG/DL — HIGH (ref 35–55)
HGB BLD-MCNC: 11.4 G/DL — LOW (ref 13–17)
HGB BLD-MCNC: 12.2 G/DL — LOW (ref 13–17)
IMM GRANULOCYTES # BLD AUTO: 0.04 # — SIGNIFICANT CHANGE UP
IMM GRANULOCYTES NFR BLD AUTO: 0.3 % — SIGNIFICANT CHANGE UP (ref 0–1.5)
INR BLD: 2.07 — HIGH (ref 0.88–1.17)
LIPID PNL WITH DIRECT LDL SERPL: 98 MG/DL — SIGNIFICANT CHANGE UP
LYMPHOCYTES # BLD AUTO: 16.8 % — SIGNIFICANT CHANGE UP (ref 13–44)
LYMPHOCYTES # BLD AUTO: 2.26 K/UL — SIGNIFICANT CHANGE UP (ref 1–3.3)
LYMPHOCYTES NFR SPEC AUTO: 8.8 % — LOW (ref 13–44)
MAGNESIUM SERPL-MCNC: 1.9 MG/DL — SIGNIFICANT CHANGE UP (ref 1.6–2.6)
MAGNESIUM SERPL-MCNC: 2 MG/DL — SIGNIFICANT CHANGE UP (ref 1.6–2.6)
MCHC RBC-ENTMCNC: 24.8 PG — LOW (ref 27–34)
MCHC RBC-ENTMCNC: 25.1 PG — LOW (ref 27–34)
MCHC RBC-ENTMCNC: 31.2 % — LOW (ref 32–36)
MCHC RBC-ENTMCNC: 32.1 % — SIGNIFICANT CHANGE UP (ref 32–36)
MCV RBC AUTO: 77.4 FL — LOW (ref 80–100)
MCV RBC AUTO: 80.2 FL — SIGNIFICANT CHANGE UP (ref 80–100)
METAMYELOCYTES # FLD: 0.9 % — SIGNIFICANT CHANGE UP (ref 0–1)
MICROCYTES BLD QL: SLIGHT — SIGNIFICANT CHANGE UP
MONOCYTES # BLD AUTO: 0.83 K/UL — SIGNIFICANT CHANGE UP (ref 0–0.9)
MONOCYTES NFR BLD AUTO: 6.2 % — SIGNIFICANT CHANGE UP (ref 2–14)
MONOCYTES NFR BLD: 2.7 % — SIGNIFICANT CHANGE UP (ref 2–9)
MYELOCYTES NFR BLD: 0 % — SIGNIFICANT CHANGE UP (ref 0–0)
NEUTROPHIL AB SER-ACNC: 77 % — SIGNIFICANT CHANGE UP (ref 43–77)
NEUTROPHILS # BLD AUTO: 10.3 K/UL — HIGH (ref 1.8–7.4)
NEUTROPHILS NFR BLD AUTO: 76.5 % — SIGNIFICANT CHANGE UP (ref 43–77)
NEUTS BAND # BLD: 0 % — SIGNIFICANT CHANGE UP (ref 0–6)
NRBC # FLD: 0 — SIGNIFICANT CHANGE UP
NRBC # FLD: 0 — SIGNIFICANT CHANGE UP
OTHER - HEMATOLOGY %: 0 — SIGNIFICANT CHANGE UP
OVALOCYTES BLD QL SMEAR: SLIGHT — SIGNIFICANT CHANGE UP
PHOSPHATE SERPL-MCNC: 2.7 MG/DL — SIGNIFICANT CHANGE UP (ref 2.5–4.5)
PLATELET # BLD AUTO: 288 K/UL — SIGNIFICANT CHANGE UP (ref 150–400)
PLATELET # BLD AUTO: 311 K/UL — SIGNIFICANT CHANGE UP (ref 150–400)
PLATELET COUNT - ESTIMATE: NORMAL — SIGNIFICANT CHANGE UP
PMV BLD: 11.2 FL — SIGNIFICANT CHANGE UP (ref 7–13)
PMV BLD: 11.5 FL — SIGNIFICANT CHANGE UP (ref 7–13)
POIKILOCYTOSIS BLD QL AUTO: SIGNIFICANT CHANGE UP
POLYCHROMASIA BLD QL SMEAR: SLIGHT — SIGNIFICANT CHANGE UP
POTASSIUM SERPL-MCNC: 4 MMOL/L — SIGNIFICANT CHANGE UP (ref 3.5–5.3)
POTASSIUM SERPL-MCNC: 4 MMOL/L — SIGNIFICANT CHANGE UP (ref 3.5–5.3)
POTASSIUM SERPL-SCNC: 4 MMOL/L — SIGNIFICANT CHANGE UP (ref 3.5–5.3)
POTASSIUM SERPL-SCNC: 4 MMOL/L — SIGNIFICANT CHANGE UP (ref 3.5–5.3)
PROMYELOCYTES # FLD: 0 % — SIGNIFICANT CHANGE UP (ref 0–0)
PROT SERPL-MCNC: 7.9 G/DL — SIGNIFICANT CHANGE UP (ref 6–8.3)
PROTHROM AB SERPL-ACNC: 24.2 SEC — HIGH (ref 9.8–13.1)
RBC # BLD: 4.55 M/UL — SIGNIFICANT CHANGE UP (ref 4.2–5.8)
RBC # BLD: 4.91 M/UL — SIGNIFICANT CHANGE UP (ref 4.2–5.8)
RBC # FLD: 16 % — HIGH (ref 10.3–14.5)
RBC # FLD: 16.1 % — HIGH (ref 10.3–14.5)
SCHISTOCYTES BLD QL AUTO: SLIGHT — SIGNIFICANT CHANGE UP
SODIUM SERPL-SCNC: 140 MMOL/L — SIGNIFICANT CHANGE UP (ref 135–145)
SODIUM SERPL-SCNC: 144 MMOL/L — SIGNIFICANT CHANGE UP (ref 135–145)
TARGETS BLD QL SMEAR: SLIGHT — SIGNIFICANT CHANGE UP
TRIGL SERPL-MCNC: 58 MG/DL — SIGNIFICANT CHANGE UP (ref 10–149)
TSH SERPL-MCNC: 0.48 UIU/ML — SIGNIFICANT CHANGE UP (ref 0.27–4.2)
VARIANT LYMPHS # BLD: 9.7 % — SIGNIFICANT CHANGE UP
WBC # BLD: 13.47 K/UL — HIGH (ref 3.8–10.5)
WBC # BLD: 13.57 K/UL — HIGH (ref 3.8–10.5)
WBC # FLD AUTO: 13.47 K/UL — HIGH (ref 3.8–10.5)
WBC # FLD AUTO: 13.57 K/UL — HIGH (ref 3.8–10.5)

## 2018-06-27 PROCEDURE — 93306 TTE W/DOPPLER COMPLETE: CPT | Mod: 26

## 2018-06-27 RX ORDER — WARFARIN SODIUM 2.5 MG/1
5 TABLET ORAL ONCE
Qty: 0 | Refills: 0 | Status: COMPLETED | OUTPATIENT
Start: 2018-06-27 | End: 2018-06-27

## 2018-06-27 RX ORDER — MAGNESIUM SULFATE 500 MG/ML
1 VIAL (ML) INJECTION ONCE
Qty: 0 | Refills: 0 | Status: COMPLETED | OUTPATIENT
Start: 2018-06-27 | End: 2018-06-27

## 2018-06-27 RX ORDER — BUMETANIDE 0.25 MG/ML
2 INJECTION INTRAMUSCULAR; INTRAVENOUS ONCE
Qty: 0 | Refills: 0 | Status: COMPLETED | OUTPATIENT
Start: 2018-06-27 | End: 2018-06-27

## 2018-06-27 RX ORDER — BUMETANIDE 0.25 MG/ML
2 INJECTION INTRAMUSCULAR; INTRAVENOUS
Qty: 0 | Refills: 0 | Status: DISCONTINUED | OUTPATIENT
Start: 2018-06-27 | End: 2018-06-27

## 2018-06-27 RX ORDER — BUMETANIDE 0.25 MG/ML
2 INJECTION INTRAMUSCULAR; INTRAVENOUS ONCE
Qty: 0 | Refills: 0 | Status: DISCONTINUED | OUTPATIENT
Start: 2018-06-27 | End: 2018-06-27

## 2018-06-27 RX ADMIN — Medication 81 MILLIGRAM(S): at 17:03

## 2018-06-27 RX ADMIN — LISINOPRIL 40 MILLIGRAM(S): 2.5 TABLET ORAL at 06:10

## 2018-06-27 RX ADMIN — Medication 325 MILLIGRAM(S): at 17:04

## 2018-06-27 RX ADMIN — SPIRONOLACTONE 25 MILLIGRAM(S): 25 TABLET, FILM COATED ORAL at 06:10

## 2018-06-27 RX ADMIN — Medication 100 GRAM(S): at 07:34

## 2018-06-27 RX ADMIN — CLOPIDOGREL BISULFATE 75 MILLIGRAM(S): 75 TABLET, FILM COATED ORAL at 17:03

## 2018-06-27 RX ADMIN — CARVEDILOL PHOSPHATE 6.25 MILLIGRAM(S): 80 CAPSULE, EXTENDED RELEASE ORAL at 17:04

## 2018-06-27 RX ADMIN — ATORVASTATIN CALCIUM 80 MILLIGRAM(S): 80 TABLET, FILM COATED ORAL at 21:38

## 2018-06-27 RX ADMIN — Medication 1: at 18:04

## 2018-06-27 RX ADMIN — CARVEDILOL PHOSPHATE 6.25 MILLIGRAM(S): 80 CAPSULE, EXTENDED RELEASE ORAL at 06:10

## 2018-06-27 RX ADMIN — BUMETANIDE 2 MILLIGRAM(S): 0.25 INJECTION INTRAMUSCULAR; INTRAVENOUS at 18:25

## 2018-06-27 RX ADMIN — WARFARIN SODIUM 5 MILLIGRAM(S): 2.5 TABLET ORAL at 18:42

## 2018-06-27 NOTE — PROGRESS NOTE ADULT - PROBLEM SELECTOR PLAN 1
- s/p bumex bolus and drip  - resolving - s/p bumex bolus and drip  - resolving  - c/w coreg 6.25mg  - c/w lisinopril 40mg  - c/w spironolactone 25mg   - c/w imdur 30mg

## 2018-06-27 NOTE — CONSULT NOTE ADULT - SUBJECTIVE AND OBJECTIVE BOX
76 y/o male from home with a PMHx of CAD S/P stent placement to mRCA and OM1 (at [facility] in April 2017), ischemic cardiomyopathy with mild LV dysfunction (EF 45%), grade III diastolic dysfunction, PAF on Coumadin, HTN, HLD, DM, and KIMMY presents to ED with chest pain and dyspnea on exertion for the past week. Pt reports that he has been experiencing intermittent, non-pleuritic, non-exertional, non-radiating and non-reproducible right sided chest pain described as a cramping sensation by his armpit. Pt admits that his chest pain is made worse when he lifts his right arm. Pt believes something is wrong with his "stent." Pt woke up this morning with chest pain at 2AM and took a sublingual nitro and his pain resolved but he wanted to get checked out. Pt also admits to decreased exercise tolerance and dyspnea on exertion over the past week. Pt normally ambulates for a couple miles but now has shortness of breath after a couple of blocks. Pt denies fever, chills, recent travel, headache, dizziness, visual deficits, orthopnea, paroxysmal nocturnal dyspnea, palpitations, abdominal pain, N/V/D/C, hematochezia, melena, dysuria, hematuria, LOC, syncope, peripheral edema. Upon arrival to ED, EKG: NSR at 73 bpm with PVCs, LAE, IRBBB, TWI V6. CE x2: Trop 15-->14. H/H: 10.0/33.4. ProBNP: 715.7. CXR: No acute pulmonary disease. Pt is now admitted to telemetry. (26 Jun 2018 10:01)      PAST MEDICAL & SURGICAL HISTORY:  Chronic diastolic congestive heart failure, NYHA class 3  PAF (paroxysmal atrial fibrillation)  KIMMY (iron deficiency anemia)  DM (diabetes mellitus)  HLD (hyperlipidemia)  HTN (hypertension)  CHF (congestive heart failure)  CAD (coronary artery disease)  S/P coronary artery stent placement: 2x in 4/17 in [place]  S/P hernia repair: Lt      MEDICATIONS  (STANDING):  aspirin enteric coated 81 milliGRAM(s) Oral daily  atorvastatin 80 milliGRAM(s) Oral at bedtime  buMETAnide 2 milliGRAM(s) Oral once  carvedilol 6.25 milliGRAM(s) Oral every 12 hours  clopidogrel Tablet 75 milliGRAM(s) Oral daily  dextrose 50% Injectable 12.5 Gram(s) IV Push once  dextrose 50% Injectable 25 Gram(s) IV Push once  dextrose 50% Injectable 25 Gram(s) IV Push once  ferrous    sulfate 325 milliGRAM(s) Oral daily  insulin lispro (HumaLOG) corrective regimen sliding scale   SubCutaneous three times a day before meals  insulin lispro (HumaLOG) corrective regimen sliding scale   SubCutaneous at bedtime  isosorbide   mononitrate ER Tablet (IMDUR) 30 milliGRAM(s) Oral daily  lisinopril 40 milliGRAM(s) Oral daily  spironolactone 25 milliGRAM(s) Oral daily    MEDICATIONS  (PRN):  dextrose 40% Gel 15 Gram(s) Oral once PRN Blood Glucose LESS THAN 70 milliGRAM(s)/deciliter  glucagon  Injectable 1 milliGRAM(s) IntraMuscular once PRN Glucose LESS THAN 70 milligrams/deciliter      FAMILY HISTORY: Denies  SOCIAL HISTORY: Denies    REVIEW OF SYSTEMS: as above, otherwise (-)  CONSTITUTIONAL: no fevers, (+) malaise  EYES: No acute change in vision.  ENT:  No difficulty hearing, tinnitus, vertigo  NECK: No pain or stiffness  RESPIRATORY: shortness of breath  CARDIOVASCULAR: chest pain  GASTROINTESTINAL: No abdominal pain, N/V, hematemesis, diarrhea, melena, or hematochezia.  GENITOURINARY: No dysuria, frequency, hematuria  NEUROLOGICAL: No headaches, acute loss of strength, numbness, or tremors  SKIN: No rashes or lesions   LYMPH NODES: No enlarged glands  ENDOCRINE: No heat or cold intolerance  MUSCULOSKELETAL: No arthralgia, myalgia 	    Vital Signs Last 24 Hrs  T(C): 36.7 (27 Jun 2018 08:00), Max: 36.7 (26 Jun 2018 15:00)  T(F): 98 (27 Jun 2018 08:00), Max: 98 (26 Jun 2018 15:00)  HR: 72 (27 Jun 2018 11:00) (65 - 106)  BP: 114/71 (27 Jun 2018 10:00) (91/64 - 220/116)  BP(mean): 81 (27 Jun 2018 10:00) (71 - 96)  RR: 19 (27 Jun 2018 11:00) (13 - 30)  SpO2: 98% (27 Jun 2018 11:00) (92% - 100%)    Constitutional: WD, WN, NAD  HEENT: NC, AT  Neck:  Supple. No JVD, bruits or thyromegaly  Respiratory:  Adequate airflow b/l. No rhonchi. Not using accessory muscles of respiration.   Cardiovascular:  RRR. systolic murmur, No R/G. 2+ radial pulses b/l.   Gastrointestinal: Soft, NT, ND, normoactive bowel sounds.  No organomegaly, rigidity, or RT.  Extremities: WWP without cyanosis, clubbing or edema.  Neurological:  Alert and awake.  Crude sensation intact.  No acute motor deficits.       LABS:                        11.4   13.57 )-----------( 288      ( 27 Jun 2018 05:00 )             36.5     06-27    144  |  105  |  14  ----------------------------<  136<H>  4.0   |  24  |  1.00    Ca    7.8<L>      27 Jun 2018 05:00  Phos  2.7     06-26  Mg     1.9     06-27    TPro  7.9  /  Alb  4.0  /  TBili  0.5  /  DBili  x   /  AST  26  /  ALT  29  /  AlkPhos  80  06-26        PT/INR - ( 27 Jun 2018 05:00 )   PT: 24.2 SEC;   INR: 2.07          PTT - ( 26 Jun 2018 04:20 )  PTT:67.0 SEC    CAPILLARY BLOOD GLUCOSE      POCT Blood Glucose.: 137 mg/dL (27 Jun 2018 11:03)  POCT Blood Glucose.: 134 mg/dL (26 Jun 2018 22:26)  POCT Blood Glucose.: 117 mg/dL (26 Jun 2018 17:30)        RADIOLOGY & ADDITIONAL STUDIES:  < from: Xray Chest 1 View-PORTABLE IMMEDIATE (06.26.18 @ 18:31) >    EXAM:  XR CHEST PORTABLE IMMED 1V        PROCEDURE DATE:  Jun 26 2018         INTERPRETATION:  TIME OF EXAM: June 26, 2018 at 6:24 PM.    CLINICAL INFORMATION: Shortness of breath. Evaluate for flash edema.    COMPARISON:  June 26, 2018 at 4:37 AM.    TECHNIQUE:   AP Portable chest x-ray.    INTERPRETATION:     The heart is not enlarged.  Right apical scar is unchanged.  There is new bilateral perihilar airspace opacity, worse on the right may   be due to alveolar pulmonary edema.  There is a trace right pleural effusion. No left pleural effusion is seen.              IMPRESSION:  New bilateral perihilar airspace opacity, worse on the   right, which may be due to alveolar pulmonary edema.    Trace right pleural effusion.      < end of copied text >

## 2018-06-27 NOTE — PROGRESS NOTE ADULT - PROBLEM SELECTOR PLAN 3
- patient had severe regurg during episode of hypertensive emergency  - post-diuresis echo improved from severe to moderate regurg  - mitral clip not indicated at this time

## 2018-06-27 NOTE — PROGRESS NOTE ADULT - ASSESSMENT
77 y/o male with a PMHx of CAD S/P stent placement to mRCA and OM1 (at [facility] in April 2017), ischemic cardiomyopathy with mild LV dysfunction (EF 45%), grade III diastolic dysfunction, PAF on Coumadin, HTN, HLD, DM, and KIMMY presents to ED with chest pain and dyspnea on exertion. Patient was placed on telemetry. Night of admission patient began to have severe SOB and an RRT was called which showed uncontrolled HTN, dyspnea, and hypoxia with CXray positve for pulmonary edema. Patient was transferred to the CCU for further care. In the CCU patient received BiPap with Bumex bolus followed by drip. Echo at this time showed severe mitral regurgitation. After large volume diuresis patient was weaned off BiPap and satted well on room air. Echo after diuresis showed improvement of mitral regurgitation from severe to moderate.      Overnight, s/p RRT for uncontrolled HTN, dyspnea, and hypoxia. CXR (+) pulmonary edema.  Transferred to CCU for further care.     - Hypertensive Emergency w/ Acute Flash Pulmonary Edema:        - improving, off bipap, improved bp and clinical status, maintaining adequate oxygen saturation       -  c/w tele, c/w iv diuresis and antihypertensvie rx, repeat TTE, cardiology and ccu care appreciated     - Severe MVR      - Repeat TTE    - Acute diastolic CHF Exacberation        - C/w cardiac meds, tele, fluid restriction, daily weight, strict i/o    - Iron Deficiency Anemia       - Ferrous sulfate     - HLD       - Statin 75 y/o male with a PMHx of CAD S/P stent placement to mRCA and OM1 (at [facility] in April 2017), ischemic cardiomyopathy with mild LV dysfunction (EF 45%), grade III diastolic dysfunction, PAF on Coumadin, HTN, HLD, DM, and KIMMY presents to ED with chest pain and dyspnea on exertion. Patient was placed on telemetry. Night of admission patient began to have severe SOB and an RRT was called which showed uncontrolled HTN, dyspnea, and hypoxia with CXray positve for pulmonary edema. Patient was transferred to the CCU for further care. In the CCU patient received BiPap with Bumex bolus followed by drip. Echo at this time showed severe mitral regurgitation. After large volume diuresis patient was weaned off BiPap and satted well on room air. Echo after diuresis showed improvement of mitral regurgitation from severe to moderate.

## 2018-06-27 NOTE — PROGRESS NOTE ADULT - SUBJECTIVE AND OBJECTIVE BOX
Tele:    Overnight:    MEDICATIONS  (STANDING):  aspirin enteric coated 81 milliGRAM(s) Oral daily  atorvastatin 80 milliGRAM(s) Oral at bedtime  buMETAnide Infusion 1 mG/Hr (10 mL/Hr) IV Continuous <Continuous>  carvedilol 6.25 milliGRAM(s) Oral every 12 hours  clopidogrel Tablet 75 milliGRAM(s) Oral daily  dextrose 50% Injectable 12.5 Gram(s) IV Push once  dextrose 50% Injectable 25 Gram(s) IV Push once  dextrose 50% Injectable 25 Gram(s) IV Push once  ferrous    sulfate 325 milliGRAM(s) Oral daily  insulin lispro (HumaLOG) corrective regimen sliding scale   SubCutaneous three times a day before meals  insulin lispro (HumaLOG) corrective regimen sliding scale   SubCutaneous at bedtime  isosorbide   mononitrate ER Tablet (IMDUR) 30 milliGRAM(s) Oral daily  lisinopril 40 milliGRAM(s) Oral daily  spironolactone 25 milliGRAM(s) Oral daily    MEDICATIONS  (PRN):  dextrose 40% Gel 15 Gram(s) Oral once PRN Blood Glucose LESS THAN 70 milliGRAM(s)/deciliter  glucagon  Injectable 1 milliGRAM(s) IntraMuscular once PRN Glucose LESS THAN 70 milligrams/deciliter          Vital Signs Last 24 Hrs  T(C): 36.3 (27 Jun 2018 04:00), Max: 36.7 (26 Jun 2018 15:00)  T(F): 97.4 (27 Jun 2018 04:00), Max: 98 (26 Jun 2018 15:00)  HR: 70 (27 Jun 2018 07:30) (65 - 106)  BP: 106/67 (27 Jun 2018 07:00) (91/64 - 220/116)  BP(mean): 75 (27 Jun 2018 07:00) (71 - 96)  RR: 21 (27 Jun 2018 07:30) (13 - 30)  SpO2: 99% (27 Jun 2018 07:30) (92% - 100%)  I&O's Detail    26 Jun 2018 07:01  -  27 Jun 2018 07:00  --------------------------------------------------------  IN:  Total IN: 0 mL    OUT:    Indwelling Catheter - Urethral: 1850 mL  Total OUT: 1850 mL    Total NET: -1850 mL            Physical exam:   Gen-  Resp-   CV-   ABD-  EXT-  Neuro-                            11.4   13.57 )-----------( 288      ( 27 Jun 2018 05:00 )             36.5     PT/INR - ( 27 Jun 2018 05:00 )   PT: 24.2 SEC;   INR: 2.07          PTT - ( 26 Jun 2018 04:20 )  PTT:67.0 SEC  27 Jun 2018 05:00    144    |  105    |  14     ----------------------------<  136<H>  4.0     |  24     |  1.00   26 Jun 2018 23:10    140    |  105    |  13     ----------------------------<  140<H>  4.0     |  21<L>  |  0.87     Ca    7.8<L>      27 Jun 2018 05:00  Ca    8.3<L>      26 Jun 2018 23:10  Phos  2.7       26 Jun 2018 23:10  Mg     1.9       27 Jun 2018 05:00  Mg     2.0       26 Jun 2018 23:10    TPro  7.9    /  Alb  4.0    /  TBili  0.5    /  DBili  x      /  AST  26     /  ALT  29     /  AlkPhos  80     26 Jun 2018 23:10  TPro  6.7    /  Alb  3.4    /  TBili  0.3    /  DBili  x      /  AST  28     /  ALT  23     /  AlkPhos  58     26 Jun 2018 07:25      Hemoglobin A1C, Whole Blood: 6.5 % (06-27-18 @ 05:00)        Diagnostic testing: Tele: multiform PVCs, PVC pairs    Overnight: patient was taken off bipap last night and placed on bumex drip for diuresis. Patient reports symptom relief and denies chest pain/SOB/abdomina pain/dizziness. Patient endorses some blood tinged cough.     MEDICATIONS  (STANDING):  aspirin enteric coated 81 milliGRAM(s) Oral daily  atorvastatin 80 milliGRAM(s) Oral at bedtime  buMETAnide Infusion 1 mG/Hr (10 mL/Hr) IV Continuous <Continuous>  carvedilol 6.25 milliGRAM(s) Oral every 12 hours  clopidogrel Tablet 75 milliGRAM(s) Oral daily  dextrose 50% Injectable 12.5 Gram(s) IV Push once  dextrose 50% Injectable 25 Gram(s) IV Push once  dextrose 50% Injectable 25 Gram(s) IV Push once  ferrous    sulfate 325 milliGRAM(s) Oral daily  insulin lispro (HumaLOG) corrective regimen sliding scale   SubCutaneous three times a day before meals  insulin lispro (HumaLOG) corrective regimen sliding scale   SubCutaneous at bedtime  isosorbide   mononitrate ER Tablet (IMDUR) 30 milliGRAM(s) Oral daily  lisinopril 40 milliGRAM(s) Oral daily  spironolactone 25 milliGRAM(s) Oral daily    MEDICATIONS  (PRN):  dextrose 40% Gel 15 Gram(s) Oral once PRN Blood Glucose LESS THAN 70 milliGRAM(s)/deciliter  glucagon  Injectable 1 milliGRAM(s) IntraMuscular once PRN Glucose LESS THAN 70 milligrams/deciliter    Vital Signs Last 24 Hrs  T(C): 36.3 (27 Jun 2018 04:00), Max: 36.7 (26 Jun 2018 15:00)  T(F): 97.4 (27 Jun 2018 04:00), Max: 98 (26 Jun 2018 15:00)  HR: 70 (27 Jun 2018 07:30) (65 - 106)  BP: 106/67 (27 Jun 2018 07:00) (91/64 - 220/116)  BP(mean): 75 (27 Jun 2018 07:00) (71 - 96)  RR: 21 (27 Jun 2018 07:30) (13 - 30)  SpO2: 99% (27 Jun 2018 07:30) (92% - 100%)  I&O's Detail    26 Jun 2018 07:01  -  27 Jun 2018 07:00  --------------------------------------------------------  IN:  Total IN: 0 mL    OUT:    Indwelling Catheter - Urethral: 1850 mL  Total OUT: 1850 mL    Total NET: -1850 mL        Physical exam:   Gen- no acute distress  Neck- no JVD  Resp- Crackles in bilateral posterior lung fields  CV- Irregular rhythm. S1, S2. No murmurs/rubs/gallops.  ABD- soft, nontender, nondistended  EXT-2+ pulses DP  Neuro- A&Ox3  Psych- pleasant affect  Skin- warm, dry                             11.4   13.57 )-----------( 288      ( 27 Jun 2018 05:00 )             36.5     PT/INR - ( 27 Jun 2018 05:00 )   PT: 24.2 SEC;   INR: 2.07          PTT - ( 26 Jun 2018 04:20 )  PTT:67.0 SEC  27 Jun 2018 05:00    144    |  105    |  14     ----------------------------<  136<H>  4.0     |  24     |  1.00   26 Jun 2018 23:10    140    |  105    |  13     ----------------------------<  140<H>  4.0     |  21<L>  |  0.87     Ca    7.8<L>      27 Jun 2018 05:00  Ca    8.3<L>      26 Jun 2018 23:10  Phos  2.7       26 Jun 2018 23:10  Mg     1.9       27 Jun 2018 05:00  Mg     2.0       26 Jun 2018 23:10    TPro  7.9    /  Alb  4.0    /  TBili  0.5    /  DBili  x      /  AST  26     /  ALT  29     /  AlkPhos  80     26 Jun 2018 23:10  TPro  6.7    /  Alb  3.4    /  TBili  0.3    /  DBili  x      /  AST  28     /  ALT  23     /  AlkPhos  58     26 Jun 2018 07:25      Hemoglobin A1C, Whole Blood: 6.5 % (06-27-18 @ 05:00)        Diagnostic testing:

## 2018-06-27 NOTE — PROGRESS NOTE ADULT - SUBJECTIVE AND OBJECTIVE BOX
Progress Note:   · Provider Specialty	CCU	      · Subjective and Objective: 	  Tele: multiform PVCs, PVC pairs    Overnight: patient was taken off bipap last night and placed on bumex drip for diuresis. Patient reports symptom relief and denies chest pain/SOB/abdomina pain/dizziness. Patient endorses some blood tinged cough.     MEDICATIONS  (STANDING):  aspirin enteric coated 81 milliGRAM(s) Oral daily  atorvastatin 80 milliGRAM(s) Oral at bedtime  buMETAnide Infusion 1 mG/Hr (10 mL/Hr) IV Continuous <Continuous>  carvedilol 6.25 milliGRAM(s) Oral every 12 hours  clopidogrel Tablet 75 milliGRAM(s) Oral daily  dextrose 50% Injectable 12.5 Gram(s) IV Push once  dextrose 50% Injectable 25 Gram(s) IV Push once  dextrose 50% Injectable 25 Gram(s) IV Push once  ferrous    sulfate 325 milliGRAM(s) Oral daily  insulin lispro (HumaLOG) corrective regimen sliding scale   SubCutaneous three times a day before meals  insulin lispro (HumaLOG) corrective regimen sliding scale   SubCutaneous at bedtime  isosorbide   mononitrate ER Tablet (IMDUR) 30 milliGRAM(s) Oral daily  lisinopril 40 milliGRAM(s) Oral daily  spironolactone 25 milliGRAM(s) Oral daily    MEDICATIONS  (PRN):  dextrose 40% Gel 15 Gram(s) Oral once PRN Blood Glucose LESS THAN 70 milliGRAM(s)/deciliter  glucagon  Injectable 1 milliGRAM(s) IntraMuscular once PRN Glucose LESS THAN 70 milligrams/deciliter    Vital Signs Last 24 Hrs  T(C): 36.3 (27 Jun 2018 04:00), Max: 36.7 (26 Jun 2018 15:00)  T(F): 97.4 (27 Jun 2018 04:00), Max: 98 (26 Jun 2018 15:00)  HR: 70 (27 Jun 2018 07:30) (65 - 106)  BP: 106/67 (27 Jun 2018 07:00) (91/64 - 220/116)  BP(mean): 75 (27 Jun 2018 07:00) (71 - 96)  RR: 21 (27 Jun 2018 07:30) (13 - 30)  SpO2: 99% (27 Jun 2018 07:30) (92% - 100%)  I&O's Detail    26 Jun 2018 07:01  -  27 Jun 2018 07:00  --------------------------------------------------------  IN:  Total IN: 0 mL    OUT:    Indwelling Catheter - Urethral: 1850 mL  Total OUT: 1850 mL    Total NET: -1850 mL        Physical exam:   Gen- no acute distress  Neck- no JVD  Resp- Crackles in bilateral posterior lung fields  CV- Irregular rhythm. S1, S2. No murmurs/rubs/gallops.  ABD- soft, nontender, nondistended  EXT-2+ pulses DP  Neuro- A&Ox3  Psych- pleasant affect  Skin- warm, dry                             11.4   13.57 )-----------( 288      ( 27 Jun 2018 05:00 )             36.5     PT/INR - ( 27 Jun 2018 05:00 )   PT: 24.2 SEC;   INR: 2.07          PTT - ( 26 Jun 2018 04:20 )  PTT:67.0 SEC  27 Jun 2018 05:00    144    |  105    |  14     ----------------------------<  136<H>  4.0     |  24     |  1.00   26 Jun 2018 23:10    140    |  105    |  13     ----------------------------<  140<H>  4.0     |  21<L>  |  0.87     Ca    7.8<L>      27 Jun 2018 05:00  Ca    8.3<L>      26 Jun 2018 23:10  Phos  2.7       26 Jun 2018 23:10  Mg     1.9       27 Jun 2018 05:00  Mg     2.0       26 Jun 2018 23:10    TPro  7.9    /  Alb  4.0    /  TBili  0.5    /  DBili  x      /  AST  26     /  ALT  29     /  AlkPhos  80     26 Jun 2018 23:10  TPro  6.7    /  Alb  3.4    /  TBili  0.3    /  DBili  x      /  AST  28     /  ALT  23     /  AlkPhos  58     26 Jun 2018 07:25      Hemoglobin A1C, Whole Blood: 6.5 % (06-27-18 @ 05:00)        Diagnostic testing:      Assessment and Plan:   · Assessment		  77 y/o male with a PMHx of CAD S/P stent placement to mRCA and OM1 (at [facility] in April 2017), ischemic cardiomyopathy with mild LV dysfunction (EF 45%), grade III diastolic dysfunction, PAF on Coumadin, HTN, HLD, DM, and KIMMY presents to ED with chest pain and dyspnea on exertion. Patient was placed on telemetry. Night of admission patient began to have severe SOB and an RRT was called which showed uncontrolled HTN, dyspnea, and hypoxia with CXray positve for pulmonary edema. Patient was transferred to the CCU for further care. In the CCU patient received BiPap with Bumex bolus followed by drip. Echo at this time showed severe mitral regurgitation. After large volume diuresis patient was weaned off BiPap and satted well on room air. Echo after diuresis showed improvement of mitral regurgitation from severe to moderate.      Overnight, s/p RRT for uncontrolled HTN, dyspnea, and hypoxia. CXR (+) pulmonary edema.  Transferred to CCU for further care.     - Hypertensive Emergency w/ Acute Flash Pulmonary Edema:        - improving, off bipap, improved bp and clinical status, maintaining adequate oxygen saturation       -  c/w tele, c/w iv diuresis and antihypertensvie rx, repeat TTE,   - switch bumex to home dose Lasix 80mg PO daily    - Severe MVR      - Repeat TTE with moderate MR in the setting of controlled BP    - Acute diastolic CHF Exacberation        - C/w cardiac meds, tele, fluid restriction, daily weight, strict i/o    - Iron Deficiency Anemia       - Ferrous sulfate     - HLD       - Statin

## 2018-06-27 NOTE — PROGRESS NOTE ADULT - PROBLEM SELECTOR PLAN 2
- s/p bipap  - s/p bumex bolus and drip  - resolving   - reassess for SOB, O2 sats - s/p bipap  - s/p bumex bolus and drip  - resolving   - reassess for SOB, O2 sats  - c/w coreg 6.25mg  - c/w lisinopril 40mg  - c/w spironolactone 25mg   - c/w imdur 30mg

## 2018-06-27 NOTE — CONSULT NOTE ADULT - ASSESSMENT
77 y/o male with a PMHx of CAD S/P stent placement to mRCA and OM1 (at [facility] in April 2017), ischemic cardiomyopathy with mild LV dysfunction (EF 45%), grade III diastolic dysfunction, PAF on Coumadin, HTN, HLD, DM, and KIMMY presents to ED with chest pain and dyspnea on exertion.    Overnight, s/p RRT for uncontrolled HTN, dyspnea, and hypoxia. CXR (+) pulmonary edema.  Transferred to CCU for further care.     - Hypertensive Emergency w/ Acute Flash Pulmonary Edema:        - improving, off bipap, improved bp and clinical status, maintaining adequate oxygen saturation       -  c/w tele, c/w iv diuresis and antihypertensvie rx, repeat TTE, cardiology and ccu care appreciated     - Severe MVR      - Repeat TTE    - Acute diastolic CHF Exacberation        - C/w cardiac meds, tele, fluid restriction, daily weight, strict i/o    - Iron Deficiency Anemia       - Ferrous sulfate     - HLD       - Statin

## 2018-06-28 ENCOUNTER — TRANSCRIPTION ENCOUNTER (OUTPATIENT)
Age: 78
End: 2018-06-28

## 2018-06-28 VITALS
SYSTOLIC BLOOD PRESSURE: 96 MMHG | OXYGEN SATURATION: 99 % | HEART RATE: 74 BPM | RESPIRATION RATE: 22 BRPM | DIASTOLIC BLOOD PRESSURE: 56 MMHG

## 2018-06-28 LAB
ALBUMIN SERPL ELPH-MCNC: 3.9 G/DL — SIGNIFICANT CHANGE UP (ref 3.3–5)
ALP SERPL-CCNC: 70 U/L — SIGNIFICANT CHANGE UP (ref 40–120)
ALT FLD-CCNC: 27 U/L — SIGNIFICANT CHANGE UP (ref 4–41)
APTT BLD: 43.8 SEC — HIGH (ref 27.5–37.4)
AST SERPL-CCNC: 23 U/L — SIGNIFICANT CHANGE UP (ref 4–40)
BILIRUB SERPL-MCNC: 0.6 MG/DL — SIGNIFICANT CHANGE UP (ref 0.2–1.2)
BUN SERPL-MCNC: 24 MG/DL — HIGH (ref 7–23)
CALCIUM SERPL-MCNC: 8 MG/DL — LOW (ref 8.4–10.5)
CHLORIDE SERPL-SCNC: 101 MMOL/L — SIGNIFICANT CHANGE UP (ref 98–107)
CO2 SERPL-SCNC: 27 MMOL/L — SIGNIFICANT CHANGE UP (ref 22–31)
CREAT SERPL-MCNC: 1.05 MG/DL — SIGNIFICANT CHANGE UP (ref 0.5–1.3)
GLUCOSE SERPL-MCNC: 127 MG/DL — HIGH (ref 70–99)
HCT VFR BLD CALC: 35.6 % — LOW (ref 39–50)
HGB BLD-MCNC: 11.6 G/DL — LOW (ref 13–17)
INR BLD: 1.96 — HIGH (ref 0.88–1.17)
MAGNESIUM SERPL-MCNC: 2.3 MG/DL — SIGNIFICANT CHANGE UP (ref 1.6–2.6)
MCHC RBC-ENTMCNC: 24.9 PG — LOW (ref 27–34)
MCHC RBC-ENTMCNC: 32.6 % — SIGNIFICANT CHANGE UP (ref 32–36)
MCV RBC AUTO: 76.6 FL — LOW (ref 80–100)
NRBC # FLD: 0 — SIGNIFICANT CHANGE UP
PHOSPHATE SERPL-MCNC: 2.3 MG/DL — LOW (ref 2.5–4.5)
PLATELET # BLD AUTO: 296 K/UL — SIGNIFICANT CHANGE UP (ref 150–400)
PMV BLD: 11.3 FL — SIGNIFICANT CHANGE UP (ref 7–13)
POTASSIUM SERPL-MCNC: 3.7 MMOL/L — SIGNIFICANT CHANGE UP (ref 3.5–5.3)
POTASSIUM SERPL-SCNC: 3.7 MMOL/L — SIGNIFICANT CHANGE UP (ref 3.5–5.3)
PROT SERPL-MCNC: 7.7 G/DL — SIGNIFICANT CHANGE UP (ref 6–8.3)
PROTHROM AB SERPL-ACNC: 22 SEC — HIGH (ref 9.8–13.1)
RBC # BLD: 4.65 M/UL — SIGNIFICANT CHANGE UP (ref 4.2–5.8)
RBC # FLD: 16 % — HIGH (ref 10.3–14.5)
SODIUM SERPL-SCNC: 142 MMOL/L — SIGNIFICANT CHANGE UP (ref 135–145)
WBC # BLD: 8.73 K/UL — SIGNIFICANT CHANGE UP (ref 3.8–10.5)
WBC # FLD AUTO: 8.73 K/UL — SIGNIFICANT CHANGE UP (ref 3.8–10.5)

## 2018-06-28 RX ORDER — SENNA PLUS 8.6 MG/1
2 TABLET ORAL AT BEDTIME
Qty: 0 | Refills: 0 | Status: DISCONTINUED | OUTPATIENT
Start: 2018-06-28 | End: 2018-06-28

## 2018-06-28 RX ORDER — WARFARIN SODIUM 2.5 MG/1
5 TABLET ORAL ONCE
Qty: 0 | Refills: 0 | Status: DISCONTINUED | OUTPATIENT
Start: 2018-06-28 | End: 2018-06-28

## 2018-06-28 RX ORDER — POTASSIUM CHLORIDE 20 MEQ
40 PACKET (EA) ORAL ONCE
Qty: 0 | Refills: 0 | Status: COMPLETED | OUTPATIENT
Start: 2018-06-28 | End: 2018-06-28

## 2018-06-28 RX ORDER — DOCUSATE SODIUM 100 MG
100 CAPSULE ORAL THREE TIMES A DAY
Qty: 0 | Refills: 0 | Status: DISCONTINUED | OUTPATIENT
Start: 2018-06-28 | End: 2018-06-28

## 2018-06-28 RX ORDER — FUROSEMIDE 40 MG
80 TABLET ORAL DAILY
Qty: 0 | Refills: 0 | Status: DISCONTINUED | OUTPATIENT
Start: 2018-06-28 | End: 2018-06-28

## 2018-06-28 RX ORDER — BUMETANIDE 0.25 MG/ML
2 INJECTION INTRAMUSCULAR; INTRAVENOUS DAILY
Qty: 0 | Refills: 0 | Status: DISCONTINUED | OUTPATIENT
Start: 2018-06-28 | End: 2018-06-28

## 2018-06-28 RX ADMIN — SPIRONOLACTONE 25 MILLIGRAM(S): 25 TABLET, FILM COATED ORAL at 06:49

## 2018-06-28 RX ADMIN — Medication 325 MILLIGRAM(S): at 12:20

## 2018-06-28 RX ADMIN — CLOPIDOGREL BISULFATE 75 MILLIGRAM(S): 75 TABLET, FILM COATED ORAL at 12:20

## 2018-06-28 RX ADMIN — LISINOPRIL 40 MILLIGRAM(S): 2.5 TABLET ORAL at 06:48

## 2018-06-28 RX ADMIN — ISOSORBIDE MONONITRATE 30 MILLIGRAM(S): 60 TABLET, EXTENDED RELEASE ORAL at 12:21

## 2018-06-28 RX ADMIN — CARVEDILOL PHOSPHATE 6.25 MILLIGRAM(S): 80 CAPSULE, EXTENDED RELEASE ORAL at 06:48

## 2018-06-28 RX ADMIN — Medication 81 MILLIGRAM(S): at 12:20

## 2018-06-28 RX ADMIN — Medication 40 MILLIEQUIVALENT(S): at 06:48

## 2018-06-28 RX ADMIN — Medication 80 MILLIGRAM(S): at 12:19

## 2018-06-28 NOTE — DISCHARGE NOTE ADULT - CARE PROVIDER_API CALL
Prasanna Scott (DO), Cardiovascular Disease; Internal Medicine; Nuclear Cardiology  [address] [address]  Phone: [phone]  Fax: [phone] Dr. Román Grossman  [address] [place] [address]  Phone: (   )    -  Fax: (   )    -

## 2018-06-28 NOTE — DISCHARGE NOTE ADULT - HOSPITAL COURSE
75 y/o male with a PMHx of CAD S/P stent placement to mRCA and OM1 (at [facility] in April 2017), ischemic cardiomyopathy with mild LV dysfunction (EF 45%), grade III diastolic dysfunction, PAF on Coumadin, HTN, HLD, DM, and KIMMY presents to ED with chest pain and dyspnea on exertion. Patient was placed on telemetry. Night of admission patient began to have severe SOB and an RRT was called which showed uncontrolled HTN, dyspnea, and hypoxia with CXray positve for pulmonary edema. Patient was transferred to the CCU for further care. In the CCU patient received BiPap with Bumex bolus followed by drip. Echo at this time showed severe mitral regurgitation. After large volume diuresis patient was weaned off BiPap and satted well on room air. Echo after diuresis showed improvement of mitral regurgitation from severe to moderate. Patient continued to do well after diuresis with comfortable breathing on room air.

## 2018-06-28 NOTE — DISCHARGE NOTE ADULT - ADDITIONAL INSTRUCTIONS
You have an appointment with Dr. Hickey at [address] [place] [address]. Please follow-up with him. Please also follow up with your cardiologist, Dr. Scott and your primary care physician as well. You have an appointment with Dr. Hickey at [address] [place] [address] on July 6th. Please follow-up with him. Please also follow up with your cardiologist, Dr. Nilesh Salazar and your primary care physician. Dr. Román Grossman, as well You have an appointment with Dr. Hickey at [address] [place] [address] on July 6th. Please follow-up with him. Please also follow up with your cardiologist, Dr. Nilesh Salazar and your primary care physician. Dr. Román Grossman, as well   Discussed with pt

## 2018-06-28 NOTE — DISCHARGE NOTE ADULT - INSTRUCTIONS
Please eat a diet low in sodium (under 2 grams a day) and keep your fluid in take low (under 2 liters of fluids)

## 2018-06-28 NOTE — DISCHARGE NOTE ADULT - PROVIDER TOKENS
ANDREEA:'[id number]' FREE:[LAST:[Ngoc],FIRST:[Dr. France],PHONE:[(   )    -],FAX:[(   )    -],ADDRESS:[[address] [place] [address]]]

## 2018-06-28 NOTE — PROGRESS NOTE ADULT - ASSESSMENT
77 y/o male with a PMHx of CAD S/P stent placement to mRCA and OM1 (at [facility] in April 2017), ischemic cardiomyopathy with mild LV dysfunction (EF 45%), grade III diastolic dysfunction, PAF on Coumadin, HTN, HLD, DM, and KIMMY presents to ED with chest pain and dyspnea on exertion.    Overnight, s/p RRT for uncontrolled HTN, dyspnea, and hypoxia. CXR (+) pulmonary edema.  Transferred to CCU for further care.     - Hypertensive Emergency w/ Acute Flash Pulmonary Edema:        - improved, maintaining adequate oxygen saturation on room air, c/w antihypertensvie rx, repeat TTE (+) moderate MVR       - cardiology and ccu care appreciated     - Moderate MVR      - C/w medical optimization    - Acute diastolic CHF Exacberation        - Improved. C/w cardiac meds    - Iron Deficiency Anemia       - Ferrous sulfate     - HLD       - Statin    - Prostate Cancer        - Pt states he takes Lupron.  Outpt f/u.

## 2018-06-28 NOTE — PROGRESS NOTE ADULT - PROBLEM SELECTOR PLAN 2
- s/p bipap  - s/p bumex bolus and drip  - resolving   - reassess for SOB, O2 sats  - c/w coreg 6.25mg  - c/w lisinopril 40mg  - c/w spironolactone 25mg   - c/w imdur 30mg

## 2018-06-28 NOTE — DISCHARGE NOTE ADULT - PATIENT PORTAL LINK FT
You can access [facility], offered by [facility], by registering with the following website: [email or web address]

## 2018-06-28 NOTE — PROGRESS NOTE ADULT - SUBJECTIVE AND OBJECTIVE BOX
Patient seen and examined at bedside  No acute events noted overnight  Case discussed with medical team    HPI:  75 y/o male with a PMHx of CAD S/P stent placement to mRCA and OM1 (at [facility] in April 2017), ischemic cardiomyopathy with mild LV dysfunction (EF 45%), grade III diastolic dysfunction, PAF on Coumadin, HTN, HLD, DM, and KIMMY presents to ED with chest pain and dyspnea on exertion for the past week. Pt reports that he has been experiencing intermittent, non-pleuritic, non-exertional, non-radiating and non-reproducible right sided chest pain described as a cramping sensation by his armpit. Pt admits that his chest pain is made worse when he lifts his right arm. Pt believes something is wrong with his "stent." Pt woke up this morning with chest pain at 2AM and took a sublingual nitro and his pain resolved but he wanted to get checked out. Pt also admits to decreased exercise tolerance and dyspnea on exertion over the past week. Pt normally ambulates for a couple miles but now has shortness of breath after a couple of blocks. Pt denies fever, chills, recent travel, headache, dizziness, visual deficits, orthopnea, paroxysmal nocturnal dyspnea, palpitations, abdominal pain, N/V/D/C, hematochezia, melena, dysuria, hematuria, LOC, syncope, peripheral edema. Upon arrival to ED, EKG: NSR at 73 bpm with PVCs, LAE, IRBBB, TWI V6. CE x2: Trop 15-->14. H/H: 10.0/33.4. ProBNP: 715.7. CXR: No acute pulmonary disease. Pt is now admitted to telemetry. (26 Jun 2018 10:01)      PAST MEDICAL & SURGICAL HISTORY:  Chronic diastolic congestive heart failure, NYHA class 3  PAF (paroxysmal atrial fibrillation)  KIMMY (iron deficiency anemia)  DM (diabetes mellitus)  HLD (hyperlipidemia)  HTN (hypertension)  CHF (congestive heart failure)  CAD (coronary artery disease)  S/P coronary artery stent placement: 2x in 4/17 in [place]  S/P hernia repair: Lt      No Known Allergies       MEDICATIONS  (STANDING):  aspirin enteric coated 81 milliGRAM(s) Oral daily  atorvastatin 80 milliGRAM(s) Oral at bedtime  carvedilol 6.25 milliGRAM(s) Oral every 12 hours  clopidogrel Tablet 75 milliGRAM(s) Oral daily  dextrose 50% Injectable 12.5 Gram(s) IV Push once  dextrose 50% Injectable 25 Gram(s) IV Push once  dextrose 50% Injectable 25 Gram(s) IV Push once  ferrous    sulfate 325 milliGRAM(s) Oral daily  insulin lispro (HumaLOG) corrective regimen sliding scale   SubCutaneous three times a day before meals  insulin lispro (HumaLOG) corrective regimen sliding scale   SubCutaneous at bedtime  isosorbide   mononitrate ER Tablet (IMDUR) 30 milliGRAM(s) Oral daily  lisinopril 40 milliGRAM(s) Oral daily  spironolactone 25 milliGRAM(s) Oral daily    MEDICATIONS  (PRN):  dextrose 40% Gel 15 Gram(s) Oral once PRN Blood Glucose LESS THAN 70 milliGRAM(s)/deciliter  glucagon  Injectable 1 milliGRAM(s) IntraMuscular once PRN Glucose LESS THAN 70 milligrams/deciliter      REVIEW OF SYSTEMS:  CONSTITUTIONAL: (+) malaise.   EYES: No acute change in vision   ENT:  No tinnitus  NECK: No stiffness  RESPIRATORY: No hemoptysis  CARDIOVASCULAR: No chest pain, palpitations, syncope  GASTROINTESTINAL: No hematemesis, diarrhea, melena, or hematochezia.  GENITOURINARY: No hematuria  NEUROLOGICAL: No headaches  LYMPH Nodes: No enlarged glands  ENDOCRINE: No heat or cold intolerance	    T(C): 36.6 (06-28-18 @ 08:00), Max: 37 (06-27-18 @ 19:51)  HR: 65 (06-28-18 @ 08:00) (58 - 81)  BP: 108/62 (06-28-18 @ 09:00) (100/61 - 129/67)  RR: 14 (06-28-18 @ 08:00) (14 - 27)  SpO2: 98% (06-28-18 @ 09:00) (95% - 100%)    PHYSICAL EXAMINATION:   Constitutional: WD, NAD  HEENT: NC, AT  Neck:  Supple  Respiratory:  Adequate airflow b/l. Not using accessory muscles of respiration.  Cardiovascular:  S1 & S2 intact, systolic murmur, no R/G, 2+ radial pulses b/l  Gastrointestinal: Soft, NT, ND, normoactive b.s., no organomegaly/RT/rigidity  Extremities: WWP  Neurological:  Alert and awake.  No acute focal motor deficits. Crude sensation intact.     Labs and imaging reviewed    LABS:                        11.6   8.73  )-----------( 296      ( 28 Jun 2018 05:00 )             35.6     06-28    142  |  101  |  24<H>  ----------------------------<  127<H>  3.7   |  27  |  1.05    Ca    8.0<L>      28 Jun 2018 05:00  Phos  2.3     06-28  Mg     2.3     06-28    TPro  7.7  /  Alb  3.9  /  TBili  0.6  /  DBili  x   /  AST  23  /  ALT  27  /  AlkPhos  70  06-28        PT/INR - ( 28 Jun 2018 05:00 )   PT: 22.0 SEC;   INR: 1.96          PTT - ( 28 Jun 2018 05:00 )  PTT:43.8 SEC    CAPILLARY BLOOD GLUCOSE      POCT Blood Glucose.: 97 mg/dL (28 Jun 2018 08:41)  POCT Blood Glucose.: 130 mg/dL (27 Jun 2018 21:37)  POCT Blood Glucose.: 160 mg/dL (27 Jun 2018 17:54)  POCT Blood Glucose.: 108 mg/dL (27 Jun 2018 13:17)  POCT Blood Glucose.: 137 mg/dL (27 Jun 2018 11:03)        LIVER FUNCTIONS - ( 28 Jun 2018 05:00 )  Alb: 3.9 g/dL / Pro: 7.7 g/dL / ALK PHOS: 70 u/L / ALT: 27 u/L / AST: 23 u/L / GGT: x           ABG - ( 26 Jun 2018 23:10 )  pH, Arterial: 7.44  pH, Blood: x     /  pCO2: 32    /  pO2: 222   / HCO3: 23    / Base Excess: -2.2  /  SaO2: 99.6                RADIOLOGY & ADDITIONAL STUDIES:

## 2018-06-28 NOTE — PROGRESS NOTE ADULT - PROBLEM SELECTOR PLAN 1
- s/p bumex bolus and drip  - resolving  - c/w coreg 6.25mg  - c/w lisinopril 40mg  - c/w spironolactone 25mg   - c/w imdur 30mg

## 2018-06-28 NOTE — PROGRESS NOTE ADULT - PROBLEM SELECTOR PLAN 3
- patient had severe regurg during episode of hypertensive emergency  - post-diuresis echo improved from severe to moderate regurg  - mitral clip not indicated at this time, Dr Scott aware

## 2018-06-28 NOTE — DISCHARGE NOTE ADULT - CARE PLAN
Principal Discharge DX:	Flash pulmonary edema  Goal:	Prevention from happening again  Assessment and plan of treatment:	You had difficulty breathing because you had flash pulmonary edema, which means your lungs became filled with too much water. You need strict control of your blood pressure and heart failure to prevent this from happening again. Please follow-up with your cardiologist.  Secondary Diagnosis:	Hypertensive emergency  Goal:	Prevention from happening again  Assessment and plan of treatment:	You came in with a very high blood pressure which likely caused your pulmonary edema. You need strict control of your high blood pressure to prevent this happening again. Please follow-up with your cardiologist.  Secondary Diagnosis:	Chronic diastolic congestive heart failure, NYHA class 3  Goal:	Prevention from acute exacerbation  Assessment and plan of treatment:	You need  Secondary Diagnosis:	Hypertension, unspecified type  Goal:	Preventing complications  Assessment and plan of treatment:	Please take all your blood pressure medications and maintain a strict diet. Also be sure to follow-up with your cardiologist.  Secondary Diagnosis:	Type 2 diabetes mellitus with complication, without long-term current use of insulin  Goal:	Preventing complications  Assessment and plan of treatment:	Please take all your diabetes medications and maintain a strict diet. Also be sure to follow-up with your PCP.  Secondary Diagnosis:	Iron deficiency anemia, unspecified iron deficiency anemia type  Goal:	Preventing complications  Assessment and plan of treatment:	Please take your iron pills and follow-up with your PCP on the cause of your anemia.  Secondary Diagnosis:	Hyperlipidemia, unspecified hyperlipidemia type  Goal:	Preventing complications  Assessment and plan of treatment:	Please take your atorvastatin and monitor your cholesterol regularly.

## 2018-06-28 NOTE — DISCHARGE NOTE ADULT - PLAN OF CARE
Prevention from happening again You had difficulty breathing because you had flash pulmonary edema, which means your lungs became filled with too much water. You need strict control of your blood pressure and heart failure to prevent this from happening again. Please follow-up with your cardiologist. You came in with a very high blood pressure which likely caused your pulmonary edema. You need strict control of your high blood pressure to prevent this happening again. Please follow-up with your cardiologist. Prevention from acute exacerbation You need Preventing complications Please take all your blood pressure medications and maintain a strict diet. Also be sure to follow-up with your cardiologist. Please take all your diabetes medications and maintain a strict diet. Also be sure to follow-up with your PCP. Please take your iron pills and follow-up with your PCP on the cause of your anemia. Please take your atorvastatin and monitor your cholesterol regularly.

## 2018-06-28 NOTE — PROGRESS NOTE ADULT - SUBJECTIVE AND OBJECTIVE BOX
Tele:    Overnight:    MEDICATIONS  (STANDING):  aspirin enteric coated 81 milliGRAM(s) Oral daily  atorvastatin 80 milliGRAM(s) Oral at bedtime  carvedilol 6.25 milliGRAM(s) Oral every 12 hours  clopidogrel Tablet 75 milliGRAM(s) Oral daily  dextrose 50% Injectable 12.5 Gram(s) IV Push once  dextrose 50% Injectable 25 Gram(s) IV Push once  dextrose 50% Injectable 25 Gram(s) IV Push once  ferrous    sulfate 325 milliGRAM(s) Oral daily  insulin lispro (HumaLOG) corrective regimen sliding scale   SubCutaneous three times a day before meals  insulin lispro (HumaLOG) corrective regimen sliding scale   SubCutaneous at bedtime  isosorbide   mononitrate ER Tablet (IMDUR) 30 milliGRAM(s) Oral daily  lisinopril 40 milliGRAM(s) Oral daily  spironolactone 25 milliGRAM(s) Oral daily    MEDICATIONS  (PRN):  dextrose 40% Gel 15 Gram(s) Oral once PRN Blood Glucose LESS THAN 70 milliGRAM(s)/deciliter  glucagon  Injectable 1 milliGRAM(s) IntraMuscular once PRN Glucose LESS THAN 70 milligrams/deciliter          Vital Signs Last 24 Hrs  T(C): 36.6 (28 Jun 2018 08:00), Max: 37 (27 Jun 2018 19:51)  T(F): 97.9 (28 Jun 2018 08:00), Max: 98.6 (27 Jun 2018 19:51)  HR: 62 (28 Jun 2018 07:39) (58 - 81)  BP: 109/64 (28 Jun 2018 06:00) (100/61 - 129/67)  BP(mean): 74 (28 Jun 2018 06:00) (67 - 89)  RR: 22 (28 Jun 2018 06:00) (16 - 27)  SpO2: 99% (28 Jun 2018 07:39) (95% - 100%)  I&O's Detail    27 Jun 2018 07:01  -  28 Jun 2018 07:00  --------------------------------------------------------  IN:    bumetanide Infusion: 20 mL    Oral Fluid: 360 mL  Total IN: 380 mL    OUT:    Indwelling Catheter - Urethral: 1000 mL    Voided: 1025 mL  Total OUT: 2025 mL    Total NET: -1645 mL            Physical exam:   Gen-  Resp-   CV-   ABD-  EXT-  Neuro-                            11.6   8.73  )-----------( 296      ( 28 Jun 2018 05:00 )             35.6     PT/INR - ( 28 Jun 2018 05:00 )   PT: 22.0 SEC;   INR: 1.96          PTT - ( 28 Jun 2018 05:00 )  PTT:43.8 SEC  28 Jun 2018 05:00    142    |  101    |  24<H>  ----------------------------<  127<H>  3.7     |  27     |  1.05   27 Jun 2018 05:00    144    |  105    |  14     ----------------------------<  136<H>  4.0     |  24     |  1.00     Ca    8.0<L>      28 Jun 2018 05:00  Ca    7.8<L>      27 Jun 2018 05:00  Phos  2.3<L>     28 Jun 2018 05:00  Phos  2.7       26 Jun 2018 23:10  Mg     2.3       28 Jun 2018 05:00  Mg     1.9       27 Jun 2018 05:00    TPro  7.7    /  Alb  3.9    /  TBili  0.6    /  DBili  x      /  AST  23     /  ALT  27     /  AlkPhos  70     28 Jun 2018 05:00  TPro  7.9    /  Alb  4.0    /  TBili  0.5    /  DBili  x      /  AST  26     /  ALT  29     /  AlkPhos  80     26 Jun 2018 23:10              Hemoglobin A1C, Whole Blood: 6.5 % (06-27-18 @ 05:00)        Diagnostic testing:        Assessment and Plan: Tele: no events overnight    Overnight: patient did well overnight. Patient denies SOB/CP/swelling.     MEDICATIONS  (STANDING):  aspirin enteric coated 81 milliGRAM(s) Oral daily  atorvastatin 80 milliGRAM(s) Oral at bedtime  carvedilol 6.25 milliGRAM(s) Oral every 12 hours  clopidogrel Tablet 75 milliGRAM(s) Oral daily  dextrose 50% Injectable 12.5 Gram(s) IV Push once  dextrose 50% Injectable 25 Gram(s) IV Push once  dextrose 50% Injectable 25 Gram(s) IV Push once  ferrous    sulfate 325 milliGRAM(s) Oral daily  insulin lispro (HumaLOG) corrective regimen sliding scale   SubCutaneous three times a day before meals  insulin lispro (HumaLOG) corrective regimen sliding scale   SubCutaneous at bedtime  isosorbide   mononitrate ER Tablet (IMDUR) 30 milliGRAM(s) Oral daily  lisinopril 40 milliGRAM(s) Oral daily  spironolactone 25 milliGRAM(s) Oral daily    MEDICATIONS  (PRN):  dextrose 40% Gel 15 Gram(s) Oral once PRN Blood Glucose LESS THAN 70 milliGRAM(s)/deciliter  glucagon  Injectable 1 milliGRAM(s) IntraMuscular once PRN Glucose LESS THAN 70 milligrams/deciliter    Vital Signs Last 24 Hrs  T(C): 36.6 (28 Jun 2018 08:00), Max: 37 (27 Jun 2018 19:51)  T(F): 97.9 (28 Jun 2018 08:00), Max: 98.6 (27 Jun 2018 19:51)  HR: 62 (28 Jun 2018 07:39) (58 - 81)  BP: 109/64 (28 Jun 2018 06:00) (100/61 - 129/67)  BP(mean): 74 (28 Jun 2018 06:00) (67 - 89)  RR: 22 (28 Jun 2018 06:00) (16 - 27)  SpO2: 99% (28 Jun 2018 07:39) (95% - 100%)  I&O's Detail    27 Jun 2018 07:01  -  28 Jun 2018 07:00  --------------------------------------------------------  IN:    bumetanide Infusion: 20 mL    Oral Fluid: 360 mL  Total IN: 380 mL    OUT:    Indwelling Catheter - Urethral: 1000 mL    Voided: 1025 mL  Total OUT: 2025 mL    Total NET: -1645 mL      Physical exam:   Gen- no acute distress  Neck- no JVD  Resp- CTABL  CV- Irregular heart beat. S1, S2. No murmurs/rubs/gallops.  ABD- soft, nontender, nondistended  EXT- 2+ DP pulses bilaterally  Neuro- A&Ox3  Psych- pleasant affect               11.6   8.73  )-----------( 296      ( 28 Jun 2018 05:00 )             35.6     PT/INR - ( 28 Jun 2018 05:00 )   PT: 22.0 SEC;   INR: 1.96          PTT - ( 28 Jun 2018 05:00 )  PTT:43.8 SEC  28 Jun 2018 05:00    142    |  101    |  24<H>  ----------------------------<  127<H>  3.7     |  27     |  1.05   27 Jun 2018 05:00    144    |  105    |  14     ----------------------------<  136<H>  4.0     |  24     |  1.00     Ca    8.0<L>      28 Jun 2018 05:00  Ca    7.8<L>      27 Jun 2018 05:00  Phos  2.3<L>     28 Jun 2018 05:00  Phos  2.7       26 Jun 2018 23:10  Mg     2.3       28 Jun 2018 05:00  Mg     1.9       27 Jun 2018 05:00    TPro  7.7    /  Alb  3.9    /  TBili  0.6    /  DBili  x      /  AST  23     /  ALT  27     /  AlkPhos  70     28 Jun 2018 05:00  TPro  7.9    /  Alb  4.0    /  TBili  0.5    /  DBili  x      /  AST  26     /  ALT  29     /  AlkPhos  80     26 Jun 2018 23:10      Hemoglobin A1C, Whole Blood: 6.5 % (06-27-18 @ 05:00)        Diagnostic testing:

## 2018-06-28 NOTE — DISCHARGE NOTE ADULT - SECONDARY DIAGNOSIS.
Hypertensive emergency Chronic diastolic congestive heart failure, NYHA class 3 Hypertension, unspecified type Type 2 diabetes mellitus with complication, without long-term current use of insulin Iron deficiency anemia, unspecified iron deficiency anemia type Hyperlipidemia, unspecified hyperlipidemia type

## 2018-06-28 NOTE — PROGRESS NOTE ADULT - ASSESSMENT
75 y/o male with a PMHx of CAD S/P stent placement to mRCA and OM1 (at [facility] in April 2017), ischemic cardiomyopathy with mild LV dysfunction (EF 45%), grade III diastolic dysfunction, PAF on Coumadin, HTN, HLD, DM, and KIMMY presents to ED with chest pain and dyspnea on exertion. Patient was placed on telemetry. Night of admission patient began to have severe SOB and an RRT was called which showed uncontrolled HTN, dyspnea, and hypoxia with CXray positve for pulmonary edema. Patient was transferred to the CCU for further care. In the CCU patient received BiPap with Bumex bolus followed by drip. Echo at this time showed severe mitral regurgitation. After large volume diuresis patient was weaned off BiPap and satted well on room air. Echo after diuresis showed improvement of mitral regurgitation from severe to moderate.

## 2018-06-28 NOTE — PROGRESS NOTE ADULT - PROBLEM SELECTOR PLAN 5
- c/w coreg 6.25mg  - c/w lisinopril 40mg  - c/w spironolactone 25mg   - c/w imdur 30mg
- c/w coreg 6.25mg  - c/w lisinopril 40mg  - c/w spironolactone 25mg   - c/w imdur 30mg

## 2018-12-24 NOTE — DISCHARGE NOTE ADULT - NS AS DC FU [facility] EJECTION FRACTION >40 %
Medication refill request:    Last Office Visit:  November 05, 2018  Next Office Visit:  No future appointments. Pharmacy verified. Yes    Patient requesting 90 day supply. yes

## 2019-05-08 NOTE — ED PROVIDER NOTE - NS ED ATTENDING STATEMENT MOD
[facility]
I have personally seen and examined this patient. I have fully participated in the care of this patient. I have reviewed all pertinent clinical information, including history physical exam, plan and the Resident's note and agree except as noted

## 2019-11-12 NOTE — ED ADULT TRIAGE NOTE - TEMPERATURE IN FAHRENHEIT (DEGREES F)
Increase Humalog 75/25 to 40 units before breakfast, 25 units before supper.  Call if blood sugars are running under 100 or over 200.  Take atorvastatin once week.  Take vit D 2,000 units daily.  Increase exercise as able.  Drink plenty of water.  F/u in 6 months, with labs prior.       97.8

## 2020-01-09 NOTE — H&P ADULT - PROBLEM/PLAN-6
DISPLAY PLAN FREE TEXT Eucrisa Pregnancy And Lactation Text: This medication has not been assigned a Pregnancy Risk Category but animal studies failed to show danger with the topical medication. It is unknown if the medication is excreted in breast milk.

## 2020-09-16 NOTE — H&P ADULT. - NEGATIVE GENERAL SYMPTOMS
Pt in mask. Instructed to wear mask at all times during hospital stay unless instructed otherwise by staff. RN in mask at all times while in pt room. Hand hygeine done at least on entry into and exit from pt room.       Alicia Figueroa RN  09/15/20 [id number]     no chills/no sweating/no anorexia/no weight loss/no weight gain/no fatigue/no fever/no malaise

## 2021-04-06 NOTE — DISCHARGE NOTE ADULT - DO YOU HAVE DIFFICULTY CLIMBING STAIRS
[de-identified] : 40 yro pt referred by Dr. Cherry for eval of left neck mass that was positive for carcinoma on FNA. \par Pt first noticed mass a few months ago, unchanged in size since before biopsy on 3/26/21. Denies neck pain, dysphagia, dyspnea, dysphonia. No fever, chills, weakness, weight loss. Eating and drinking without issues. \par Pt smokes medical marijuana daily for over 20 years. \par PET/CT will be scheduled soon.  \par \par CT 3/18/2021 ([facility]): heterogeneous appearing left side level 2A lymphadenopahy- largest is 2.1 x 1.5cm\par \par path 3/26/2021:  Final Diagnosis\par LYMPH NODE, LEVEL 2, CERVICAL, LEFT, US GUIDED CORE BIOPSY AND FNA POSITIVE FOR MALIGNANT CELLS.\par Metastatic carcinoma, pending confirmatory immunohistochemical stains.\par Immunostains show that the neoplastic cells are positive for P40\par and CK 7. 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 
No

## 2021-05-20 NOTE — ED ADULT TRIAGE NOTE - PAIN RATING/NUMBER SCALE (0-10): ACTIVITY
Kelin Ryan LPN and and I were pleased to meet with you today. HERE IS SOME IMPORTANT INFORMATION FOR OUR PATIENTS   If you need refills - call your pharmacy and they will contact our office. [facility] has a 72 hour refill policy. If you need refills of a controlled substance please call our office at least 3 days prior to your last tablet to assure you do  not run out of your medication before your next refill. If you have medical concerns after hours, and to make appointments, call  [phone]. If you have a question during office hours call  [phone]. You will eventually speak with our medical assistants or nurses. There is an nurse available at our clinic from 8 am- 5 pm to help answer any questions that may arise. If you need to speak to me directly, let them know and I will get back to you as soon as possible. Better yet, you can consider signing up for [facility] [employer], our popular online communication portal to schedule an appointment, ask for a refill, or contact our staff. Go to:  [email or web address]    Finally, you may receive a survey about today's visit. We always appreciate your feedback. Please tell us what we are doing well and how we can improve! Your responses allow us to improve the care we provide to all our patients. Thank you for taking the time to complete the survey.     Bernabe Villanueva, AZIZA, APNP, FNP-BC
0

## 2021-10-13 NOTE — ED PROVIDER NOTE - NSSUBSTANCEUSE_GEN_ALL_CORE_SD
Called pt and informed them of results as stated below. Pt states understanding with no further questions at this time.    Result letter sent, health history and health maintenance updated, and recall entered.     never used

## 2021-10-15 NOTE — ED ADULT NURSE NOTE - EXTENSIONS OF SELF_ADULT
Mom states child was c/o sore throat yesterday, throat is a better today. Fever today of 102.8. No cold symptoms or cough. More tired than usual. Taking fluids well. Advised to treat fever with tylenol or motrin, push fluids.  Advised no openings today in r None

## 2021-11-19 NOTE — H&P ADULT. - PROBLEM SELECTOR PLAN 2
No Monitor on tele, currently in NSR  Continue Coreg for rate control  Continue Coumadin for CVA prevention with target INR 2.0-3.0  Hold Coumadin today for supratherapeutic INR 3.40  Check INR daily and dose accordingly

## 2022-06-15 ENCOUNTER — INPATIENT (INPATIENT)
Facility: HOSPITAL | Age: 82
LOS: 0 days | Discharge: ANOTHER HOSPITAL | End: 2022-06-16
Attending: INTERNAL MEDICINE | Admitting: INTERNAL MEDICINE
Payer: MEDICARE

## 2022-06-15 VITALS
RESPIRATION RATE: 18 BRPM | SYSTOLIC BLOOD PRESSURE: 140 MMHG | DIASTOLIC BLOOD PRESSURE: 77 MMHG | TEMPERATURE: 98 F | OXYGEN SATURATION: 98 % | HEART RATE: 85 BPM | HEIGHT: 67 IN

## 2022-06-15 DIAGNOSIS — I50.9 HEART FAILURE, UNSPECIFIED: ICD-10-CM

## 2022-06-15 DIAGNOSIS — E11.9 TYPE 2 DIABETES MELLITUS WITHOUT COMPLICATIONS: ICD-10-CM

## 2022-06-15 DIAGNOSIS — I21.4 NON-ST ELEVATION (NSTEMI) MYOCARDIAL INFARCTION: ICD-10-CM

## 2022-06-15 DIAGNOSIS — Z29.9 ENCOUNTER FOR PROPHYLACTIC MEASURES, UNSPECIFIED: ICD-10-CM

## 2022-06-15 DIAGNOSIS — Z95.5 PRESENCE OF CORONARY ANGIOPLASTY IMPLANT AND GRAFT: Chronic | ICD-10-CM

## 2022-06-15 DIAGNOSIS — I48.0 PAROXYSMAL ATRIAL FIBRILLATION: ICD-10-CM

## 2022-06-15 DIAGNOSIS — Z98.890 OTHER SPECIFIED POSTPROCEDURAL STATES: Chronic | ICD-10-CM

## 2022-06-15 DIAGNOSIS — I10 ESSENTIAL (PRIMARY) HYPERTENSION: ICD-10-CM

## 2022-06-15 PROBLEM — I50.32 CHRONIC DIASTOLIC (CONGESTIVE) HEART FAILURE: Chronic | Status: ACTIVE | Noted: 2017-05-16

## 2022-06-15 PROBLEM — D50.9 IRON DEFICIENCY ANEMIA, UNSPECIFIED: Chronic | Status: ACTIVE | Noted: 2017-05-16

## 2022-06-15 LAB
ALBUMIN SERPL ELPH-MCNC: 4.1 G/DL — SIGNIFICANT CHANGE UP (ref 3.3–5)
ALP SERPL-CCNC: 106 U/L — SIGNIFICANT CHANGE UP (ref 40–120)
ALT FLD-CCNC: 46 U/L — HIGH (ref 4–41)
ANION GAP SERPL CALC-SCNC: 10 MMOL/L — SIGNIFICANT CHANGE UP (ref 7–14)
APTT BLD: 33.5 SEC — SIGNIFICANT CHANGE UP (ref 27–36.3)
AST SERPL-CCNC: 41 U/L — HIGH (ref 4–40)
BASE EXCESS BLDV CALC-SCNC: 0.3 MMOL/L — SIGNIFICANT CHANGE UP (ref -2–3)
BASOPHILS # BLD AUTO: 0.01 K/UL — SIGNIFICANT CHANGE UP (ref 0–0.2)
BASOPHILS NFR BLD AUTO: 0.1 % — SIGNIFICANT CHANGE UP (ref 0–2)
BILIRUB SERPL-MCNC: 0.5 MG/DL — SIGNIFICANT CHANGE UP (ref 0.2–1.2)
BLOOD GAS VENOUS COMPREHENSIVE RESULT: SIGNIFICANT CHANGE UP
BUN SERPL-MCNC: 15 MG/DL — SIGNIFICANT CHANGE UP (ref 7–23)
CALCIUM SERPL-MCNC: 9.2 MG/DL — SIGNIFICANT CHANGE UP (ref 8.4–10.5)
CHLORIDE BLDV-SCNC: 104 MMOL/L — SIGNIFICANT CHANGE UP (ref 96–108)
CHLORIDE SERPL-SCNC: 102 MMOL/L — SIGNIFICANT CHANGE UP (ref 98–107)
CO2 BLDV-SCNC: 28 MMOL/L — HIGH (ref 22–26)
CO2 SERPL-SCNC: 25 MMOL/L — SIGNIFICANT CHANGE UP (ref 22–31)
CREAT SERPL-MCNC: 0.83 MG/DL — SIGNIFICANT CHANGE UP (ref 0.5–1.3)
EGFR: 89 ML/MIN/1.73M2 — SIGNIFICANT CHANGE UP
EOSINOPHIL # BLD AUTO: 0.1 K/UL — SIGNIFICANT CHANGE UP (ref 0–0.5)
EOSINOPHIL NFR BLD AUTO: 1.1 % — SIGNIFICANT CHANGE UP (ref 0–6)
GAS PNL BLDV: 136 MMOL/L — SIGNIFICANT CHANGE UP (ref 136–145)
GLUCOSE BLDC GLUCOMTR-MCNC: 121 MG/DL — HIGH (ref 70–99)
GLUCOSE BLDV-MCNC: 115 MG/DL — HIGH (ref 70–99)
GLUCOSE SERPL-MCNC: 116 MG/DL — HIGH (ref 70–99)
HCO3 BLDV-SCNC: 26 MMOL/L — SIGNIFICANT CHANGE UP (ref 22–29)
HCT VFR BLD CALC: 38.5 % — LOW (ref 39–50)
HCT VFR BLDA CALC: 38 % — LOW (ref 39–51)
HGB BLD CALC-MCNC: 12.7 G/DL — LOW (ref 13–17)
HGB BLD-MCNC: 12.5 G/DL — LOW (ref 13–17)
IANC: 6.2 K/UL — SIGNIFICANT CHANGE UP (ref 1.8–7.4)
IMM GRANULOCYTES NFR BLD AUTO: 0.3 % — SIGNIFICANT CHANGE UP (ref 0–1.5)
INR BLD: 1.08 RATIO — SIGNIFICANT CHANGE UP (ref 0.88–1.16)
LACTATE BLDV-MCNC: 0.9 MMOL/L — SIGNIFICANT CHANGE UP (ref 0.5–2)
LYMPHOCYTES # BLD AUTO: 2.55 K/UL — SIGNIFICANT CHANGE UP (ref 1–3.3)
LYMPHOCYTES # BLD AUTO: 27.2 % — SIGNIFICANT CHANGE UP (ref 13–44)
MCHC RBC-ENTMCNC: 27 PG — SIGNIFICANT CHANGE UP (ref 27–34)
MCHC RBC-ENTMCNC: 32.5 GM/DL — SIGNIFICANT CHANGE UP (ref 32–36)
MCV RBC AUTO: 83.2 FL — SIGNIFICANT CHANGE UP (ref 80–100)
MONOCYTES # BLD AUTO: 0.5 K/UL — SIGNIFICANT CHANGE UP (ref 0–0.9)
MONOCYTES NFR BLD AUTO: 5.3 % — SIGNIFICANT CHANGE UP (ref 2–14)
NEUTROPHILS # BLD AUTO: 6.2 K/UL — SIGNIFICANT CHANGE UP (ref 1.8–7.4)
NEUTROPHILS NFR BLD AUTO: 66 % — SIGNIFICANT CHANGE UP (ref 43–77)
NRBC # BLD: 0 /100 WBCS — SIGNIFICANT CHANGE UP
NRBC # FLD: 0 K/UL — SIGNIFICANT CHANGE UP
NT-PROBNP SERPL-SCNC: 1086 PG/ML — HIGH
PCO2 BLDV: 48 MMHG — SIGNIFICANT CHANGE UP (ref 42–55)
PH BLDV: 7.35 — SIGNIFICANT CHANGE UP (ref 7.32–7.43)
PLATELET # BLD AUTO: 208 K/UL — SIGNIFICANT CHANGE UP (ref 150–400)
PO2 BLDV: 27 MMHG — SIGNIFICANT CHANGE UP
POTASSIUM BLDV-SCNC: 3.4 MMOL/L — LOW (ref 3.5–5.1)
POTASSIUM SERPL-MCNC: 3.5 MMOL/L — SIGNIFICANT CHANGE UP (ref 3.5–5.3)
POTASSIUM SERPL-SCNC: 3.5 MMOL/L — SIGNIFICANT CHANGE UP (ref 3.5–5.3)
PROT SERPL-MCNC: 7.2 G/DL — SIGNIFICANT CHANGE UP (ref 6–8.3)
PROTHROM AB SERPL-ACNC: 12.5 SEC — SIGNIFICANT CHANGE UP (ref 10.5–13.4)
RBC # BLD: 4.63 M/UL — SIGNIFICANT CHANGE UP (ref 4.2–5.8)
RBC # FLD: 16.4 % — HIGH (ref 10.3–14.5)
SAO2 % BLDV: 49.8 % — SIGNIFICANT CHANGE UP
SARS-COV-2 RNA SPEC QL NAA+PROBE: SIGNIFICANT CHANGE UP
SODIUM SERPL-SCNC: 137 MMOL/L — SIGNIFICANT CHANGE UP (ref 135–145)
TROPONIN T, HIGH SENSITIVITY RESULT: 348 NG/L — CRITICAL HIGH
TROPONIN T, HIGH SENSITIVITY RESULT: 85 NG/L — CRITICAL HIGH
WBC # BLD: 9.39 K/UL — SIGNIFICANT CHANGE UP (ref 3.8–10.5)
WBC # FLD AUTO: 9.39 K/UL — SIGNIFICANT CHANGE UP (ref 3.8–10.5)

## 2022-06-15 PROCEDURE — 71046 X-RAY EXAM CHEST 2 VIEWS: CPT | Mod: 26

## 2022-06-15 PROCEDURE — 99223 1ST HOSP IP/OBS HIGH 75: CPT

## 2022-06-15 PROCEDURE — 99285 EMERGENCY DEPT VISIT HI MDM: CPT

## 2022-06-15 RX ORDER — ASPIRIN/CALCIUM CARB/MAGNESIUM 324 MG
81 TABLET ORAL DAILY
Refills: 0 | Status: DISCONTINUED | OUTPATIENT
Start: 2022-06-16 | End: 2022-06-16

## 2022-06-15 RX ORDER — WARFARIN SODIUM 2.5 MG/1
1 TABLET ORAL
Qty: 0 | Refills: 0 | DISCHARGE

## 2022-06-15 RX ORDER — HEPARIN SODIUM 5000 [USP'U]/ML
4000 INJECTION INTRAVENOUS; SUBCUTANEOUS EVERY 6 HOURS
Refills: 0 | Status: DISCONTINUED | OUTPATIENT
Start: 2022-06-15 | End: 2022-06-16

## 2022-06-15 RX ORDER — DEXTROSE 50 % IN WATER 50 %
25 SYRINGE (ML) INTRAVENOUS ONCE
Refills: 0 | Status: DISCONTINUED | OUTPATIENT
Start: 2022-06-15 | End: 2022-06-16

## 2022-06-15 RX ORDER — PANTOPRAZOLE SODIUM 20 MG/1
40 TABLET, DELAYED RELEASE ORAL
Refills: 0 | Status: DISCONTINUED | OUTPATIENT
Start: 2022-06-15 | End: 2022-06-16

## 2022-06-15 RX ORDER — CLOPIDOGREL BISULFATE 75 MG/1
300 TABLET, FILM COATED ORAL ONCE
Refills: 0 | Status: COMPLETED | OUTPATIENT
Start: 2022-06-15 | End: 2022-06-15

## 2022-06-15 RX ORDER — ASPIRIN/CALCIUM CARB/MAGNESIUM 324 MG
81 TABLET ORAL ONCE
Refills: 0 | Status: COMPLETED | OUTPATIENT
Start: 2022-06-15 | End: 2022-06-15

## 2022-06-15 RX ORDER — HEPARIN SODIUM 5000 [USP'U]/ML
4000 INJECTION INTRAVENOUS; SUBCUTANEOUS ONCE
Refills: 0 | Status: COMPLETED | OUTPATIENT
Start: 2022-06-15 | End: 2022-06-15

## 2022-06-15 RX ORDER — TAMSULOSIN HYDROCHLORIDE 0.4 MG/1
0.4 CAPSULE ORAL AT BEDTIME
Refills: 0 | Status: DISCONTINUED | OUTPATIENT
Start: 2022-06-15 | End: 2022-06-16

## 2022-06-15 RX ORDER — ATORVASTATIN CALCIUM 80 MG/1
80 TABLET, FILM COATED ORAL AT BEDTIME
Refills: 0 | Status: DISCONTINUED | OUTPATIENT
Start: 2022-06-15 | End: 2022-06-16

## 2022-06-15 RX ORDER — ISOSORBIDE MONONITRATE 60 MG/1
30 TABLET, EXTENDED RELEASE ORAL DAILY
Refills: 0 | Status: DISCONTINUED | OUTPATIENT
Start: 2022-06-15 | End: 2022-06-16

## 2022-06-15 RX ORDER — DEXTROSE 50 % IN WATER 50 %
15 SYRINGE (ML) INTRAVENOUS ONCE
Refills: 0 | Status: DISCONTINUED | OUTPATIENT
Start: 2022-06-15 | End: 2022-06-16

## 2022-06-15 RX ORDER — CARVEDILOL PHOSPHATE 80 MG/1
6.25 CAPSULE, EXTENDED RELEASE ORAL EVERY 12 HOURS
Refills: 0 | Status: DISCONTINUED | OUTPATIENT
Start: 2022-06-15 | End: 2022-06-16

## 2022-06-15 RX ORDER — HEPARIN SODIUM 5000 [USP'U]/ML
INJECTION INTRAVENOUS; SUBCUTANEOUS
Qty: 25000 | Refills: 0 | Status: DISCONTINUED | OUTPATIENT
Start: 2022-06-15 | End: 2022-06-16

## 2022-06-15 RX ORDER — SODIUM CHLORIDE 9 MG/ML
1000 INJECTION, SOLUTION INTRAVENOUS
Refills: 0 | Status: DISCONTINUED | OUTPATIENT
Start: 2022-06-15 | End: 2022-06-16

## 2022-06-15 RX ORDER — CLOPIDOGREL BISULFATE 75 MG/1
75 TABLET, FILM COATED ORAL DAILY
Refills: 0 | Status: DISCONTINUED | OUTPATIENT
Start: 2022-06-16 | End: 2022-06-16

## 2022-06-15 RX ORDER — GLUCAGON INJECTION, SOLUTION 0.5 MG/.1ML
1 INJECTION, SOLUTION SUBCUTANEOUS ONCE
Refills: 0 | Status: DISCONTINUED | OUTPATIENT
Start: 2022-06-15 | End: 2022-06-16

## 2022-06-15 RX ORDER — INSULIN LISPRO 100/ML
VIAL (ML) SUBCUTANEOUS AT BEDTIME
Refills: 0 | Status: DISCONTINUED | OUTPATIENT
Start: 2022-06-15 | End: 2022-06-16

## 2022-06-15 RX ORDER — DEXTROSE 50 % IN WATER 50 %
12.5 SYRINGE (ML) INTRAVENOUS ONCE
Refills: 0 | Status: DISCONTINUED | OUTPATIENT
Start: 2022-06-15 | End: 2022-06-16

## 2022-06-15 RX ORDER — ACETAMINOPHEN 500 MG
650 TABLET ORAL EVERY 6 HOURS
Refills: 0 | Status: DISCONTINUED | OUTPATIENT
Start: 2022-06-15 | End: 2022-06-16

## 2022-06-15 RX ORDER — INSULIN LISPRO 100/ML
VIAL (ML) SUBCUTANEOUS
Refills: 0 | Status: DISCONTINUED | OUTPATIENT
Start: 2022-06-15 | End: 2022-06-16

## 2022-06-15 RX ADMIN — Medication 81 MILLIGRAM(S): at 15:39

## 2022-06-15 RX ADMIN — HEPARIN SODIUM 800 UNIT(S)/HR: 5000 INJECTION INTRAVENOUS; SUBCUTANEOUS at 18:48

## 2022-06-15 RX ADMIN — CLOPIDOGREL BISULFATE 300 MILLIGRAM(S): 75 TABLET, FILM COATED ORAL at 18:49

## 2022-06-15 RX ADMIN — TAMSULOSIN HYDROCHLORIDE 0.4 MILLIGRAM(S): 0.4 CAPSULE ORAL at 22:47

## 2022-06-15 RX ADMIN — HEPARIN SODIUM 4000 UNIT(S): 5000 INJECTION INTRAVENOUS; SUBCUTANEOUS at 18:48

## 2022-06-15 RX ADMIN — ATORVASTATIN CALCIUM 80 MILLIGRAM(S): 80 TABLET, FILM COATED ORAL at 22:47

## 2022-06-15 NOTE — ED PROVIDER NOTE - CLINICAL SUMMARY MEDICAL DECISION MAKING FREE TEXT BOX
82yo F w/ hx CAD S/P stent placement to mRCA and OM1 (at [facility] in April 2017), ischemic cardiomyopathy with mild LV dysfunction (EF 45%), grade III diastolic dysfunction, PAF on plavix, HTN, HLD, DM, and KIMMY presenting with cp. cp R sided, burning pain, nonradiating, without sob, n/v, diaphoresis. took 2 aspirin, pain improving. c/f acs given hx. will get acs workup, repeat ekg, speak to cardiology.

## 2022-06-15 NOTE — ED PROVIDER NOTE - ATTENDING CONTRIBUTION TO CARE
Attending Statement: I have personally seen and examined this patient. I have fully participated in the care of this patient. I have reviewed all pertinent clinical information, including history physical exam, plan and the Resident's note and agree except as noted  80yo M w/ hx CAD S/P stent placement to mRCA and OM1 (at [facility] in April 2017), ischemic cardiomyopathy with mild LV dysfunction (EF 45%), grade III diastolic dysfunction, PAF on plavix, HTN, HLD, DM, and KIMMY pw chest pain today. Pain located on the right side of chest, intermittent, non exertional. not pleuritic. no associated SOB or LOBO no fever/chills. no cough no n/v. Vital signs noted. mmm. normal S1-S2 No resp distress. able to speak in full and clear sentences. no wheeze, rales or stridor. soft nontender abdomen. no  rebound. no guarding. no sign of trauma. no CVAT   plan ekg, labs, cxr, tele monitor, cardiology cs, tba

## 2022-06-15 NOTE — H&P ADULT - PROBLEM SELECTOR PLAN 1
Pt with chest pain, now currently resolved, elevated trop to 85 in the ER. EKG with sinus rhythm w/ LAFB and RBBB and ST segment changes II,III, avF, improved on subsequent ekgs. Spoke with Dr. Scott who had spoken to cath consult, who determined that cath can be for the morning.  -asa/plavix loaded. Heparinized  -trend troponin and ekg  -continue imdur, betablocker, statin

## 2022-06-15 NOTE — H&P ADULT - NSHPPHYSICALEXAM_GEN_ALL_CORE
PHYSICAL EXAM:  GENERAL: NAD, well-developed, well-nourished  HEAD:  Atraumatic, Normocephalic  EYES: EOMI, PERRLA, conjunctiva and sclera clear  NECK: Supple, No JVD  CHEST/LUNG: Clear to auscultation bilaterally; No wheezes, rales or rhonchi  HEART: Regular rate and rhythm; No murmurs, rubs, or gallops, (+)S1, S2  ABDOMEN: Soft, Nontender, Nondistended; Normal Bowel sounds   EXTREMITIES:  2+ Peripheral Pulses, No clubbing, cyanosis, or edema  PSYCH: normal mood and affect  NEUROLOGY: AAOx3, non-focal  SKIN: No rashes or lesions

## 2022-06-15 NOTE — H&P ADULT - NSHPLABSRESULTS_GEN_ALL_CORE
06-15    137  |  102  |  15  ----------------------------<  116<H>  3.5   |  25  |  0.83    Ca    9.2      15 Asael 2022 15:00    TPro  7.2  /  Alb  4.1  /  TBili  0.5  /  DBili  x   /  AST  41<H>  /  ALT  46<H>  /  AlkPhos  106  06-15                            12.5   9.39  )-----------( 208      ( 15 Asael 2022 15:00 )             38.5             LIVER FUNCTIONS - ( 15 Asael 2022 15:00 )  Alb: 4.1 g/dL / Pro: 7.2 g/dL / ALK PHOS: 106 U/L / ALT: 46 U/L / AST: 41 U/L / GGT: x             PT/INR - ( 15 Asael 2022 15:30 )   PT: 12.5 sec;   INR: 1.08 ratio         PTT - ( 15 Asael 2022 15:30 )  PTT:33.5 sec    EKG: sinus bradycardia with RBBB, LAFB    CXR: FINDINGS:  The lungs are clear.  There is no pleural effusion or pneumothorax.  The heart size is normal.  The visualized osseous structures demonstrate no acute pathology.    IMPRESSION:  Clear lungs.

## 2022-06-15 NOTE — H&P ADULT - NSICDXPASTMEDICALHX_GEN_ALL_CORE_FT
PAST MEDICAL HISTORY:  CAD (coronary artery disease)     CHF (congestive heart failure)     Chronic diastolic congestive heart failure, NYHA class 3     DM (diabetes mellitus)     HLD (hyperlipidemia)     HTN (hypertension)     KIMMY (iron deficiency anemia)     PAF (paroxysmal atrial fibrillation)

## 2022-06-15 NOTE — ED PROVIDER NOTE - OBJECTIVE STATEMENT
82yo M w/ hx CAD S/P stent placement to mRCA and OM1 (at [facility] in April 2017), ischemic cardiomyopathy with mild LV dysfunction (EF 45%), grade III diastolic dysfunction, PAF on plavix, HTN, HLD, DM, and KIMMY presenting with chest pain. Pt started having cp at 11am this morning, R sided, non radiating, like a burning pain. No associated n/v, diaphoresis. Pt took 2 aspirin and now pain is improving. He spoke to his cardiologist Dr. Nilesh Rondon who told him to come to ER. Pt has no sob, headache, lightheadedness, abd pain or other symptoms.

## 2022-06-15 NOTE — ED ADULT NURSE NOTE - OBJECTIVE STATEMENT
82 y/o male AAOx4, ambulatory at baseline. Presenting to room 19. Patient coming to ER complaining of chest pain. Patient states it was midsternal and nonradiating. Patient noted to be breathing even and unlabored. Medical history of a fib, htn, CAD, HLD. #20g placed to RAC. labs drawn and sent as per MD order.

## 2022-06-15 NOTE — H&P ADULT - PROBLEM SELECTOR PLAN 2
currently in sinus rhythm. No longer on warfarin due to a GI bleed in the past reportedly, but denies bleeding now. hb stable  -on heparin as above. continue ppi. monitor for signs of bleed  -continue betablocker  -confirm other meds with [facility] pharmacist

## 2022-06-15 NOTE — H&P ADULT - PROBLEM SELECTOR PLAN 3
prior admission pt w/ mild EF reduction, appears euvolemic currently. cxr clear  -confirm lasix dose in AM  -monitor I/O  -resume betablocker

## 2022-06-15 NOTE — ED PROVIDER NOTE - PROGRESS NOTE DETAILS
Ashwin Paredes- got repeat EKG which shows slightly different morphology of ST segment in 2,3,avf. spoke to cardiology who believes EKG changes do not warrant intervention at this time. will follow up trop. Ashwin Paredes- trop 85. spoke to Dr. Scott, he wants to start heparin and plavix, will contact interventional cardiology for possible cath today or tomorrow. pt updated about results and plan, pt agrees. pt has not eaten anything today. will keep npo.

## 2022-06-15 NOTE — H&P ADULT - NSICDXPASTSURGICALHX_GEN_ALL_CORE_FT
PAST SURGICAL HISTORY:  S/P coronary artery stent placement 2x in 4/17 in [place]    S/P hernia repair Lt

## 2022-06-15 NOTE — ED PROVIDER NOTE - PHYSICAL EXAMINATION
General appearance: NAD, conversant, afebrile    Eyes: anicteric sclerae, CORINNE, EOMI   HENT: Atraumatic; oropharynx clear, MMM and no ulcerations, no pharyngeal erythema or exudate   Neck: Trachea midline; Full range of motion, supple   Pulm: CTA bl, normal respiratory effort and no intercostal retractions, normal work of breathing   CV: RRR, No murmurs, rubs, or gallops. radial pulses intact   Abdomen: Soft, non-tender, non-distended; no guarding or rebound   Extremities: No peripheral edema or extremity lymphadenopathy. 5/5 strength in all four extremities.   Skin: Dry, normal temperature, turgor and texture; no rash, ulcers or subcutaneous nodules   Psych: Appropriate affect, cooperative; alert and oriented to person, place and time

## 2022-06-15 NOTE — H&P ADULT - NSHPREVIEWOFSYSTEMS_GEN_ALL_CORE
REVIEW OF SYSTEMS:    CONSTITUTIONAL: No weakness, fevers or chills  EYES/ENT: No visual changes;  No dysphagia  NECK: No pain or stiffness  RESPIRATORY: No cough, wheezing, hemoptysis; No shortness of breath  CARDIOVASCULAR: +chest pain, no palpitations; No lower extremity edema  GASTROINTESTINAL: No abdominal or epigastric pain. No nausea, vomiting, or hematemesis; No diarrhea or constipation. No melena or hematochezia.  GENITOURINARY: No dysuria, frequency or hematuria  NEUROLOGICAL: No numbness or weakness  MSK: no back pain  SKIN: No itching, burning, rashes, or lesions

## 2022-06-15 NOTE — ED ADULT NURSE NOTE - NSIMPLEMENTINTERV_GEN_ALL_ED
Implemented All Universal Safety Interventions:  [place] to call system. Call bell, personal items and telephone within reach. Instruct patient to call for assistance. Room bathroom lighting operational. Non-slip footwear when patient is off stretcher. Physically safe environment: no spills, clutter or unnecessary equipment. Stretcher in lowest position, wheels locked, appropriate side rails in place.

## 2022-06-15 NOTE — CONSULT NOTE ADULT - SUBJECTIVE AND OBJECTIVE BOX
DATE OF SERVICE: 06-15-22      CHIEF COMPLAINT:Patient is a 81y old  Male who presents with a chief complaint of Chest pain (15 Asael 2022 20:45)      HISTORY OF PRESENT ILLNESS:HPI:  80 yo male with cad s/p stent on plavix, ischemic cardiomyopathy w/ mild LV dysfunction, paroxysmal afib (not on ac due to bleed in the past?), HTN, Dm2 presents to the ER with acute R sided chest pressure starting around 11AM today. He reports it was nonexertional and he took aspirin which improved the pain eventually. At baseline he can ambulate but reports that he has been becoming more weak after exerting himself past half a block, but no chest pain or dyspnea reported. He denies headache, cough, abd pain, leg swelling, dysuria or diarrhea. He reports he has anemia and was previously on warfarin but due to a ?GI bleed he was taken off but has been doing well for two years on plavix. He says he is supposed to be on aspirin also but has not been taking this. In the eR pt was given plavix, aspirin and heparinized.   (15 Asael 2022 20:45)      PAST MEDICAL & SURGICAL HISTORY:  CAD (coronary artery disease)      CHF (congestive heart failure)      HTN (hypertension)      HLD (hyperlipidemia)      DM (diabetes mellitus)      KIMMY (iron deficiency anemia)      PAF (paroxysmal atrial fibrillation)      Chronic diastolic congestive heart failure, NYHA class 3      S/P hernia repair  Lt      S/P coronary artery stent placement  2x in 4/17 in [place]              MEDICATIONS:  carvedilol 6.25 milliGRAM(s) Oral every 12 hours  heparin   Injectable 4000 Unit(s) IV Push every 6 hours PRN  heparin  Infusion.  Unit(s)/Hr IV Continuous <Continuous>  isosorbide   mononitrate ER Tablet (IMDUR) 30 milliGRAM(s) Oral daily  tamsulosin 0.4 milliGRAM(s) Oral at bedtime        acetaminophen     Tablet .. 650 milliGRAM(s) Oral every 6 hours PRN    pantoprazole    Tablet 40 milliGRAM(s) Oral before breakfast    atorvastatin 80 milliGRAM(s) Oral at bedtime  dextrose 50% Injectable 25 Gram(s) IV Push once  dextrose 50% Injectable 12.5 Gram(s) IV Push once  dextrose 50% Injectable 25 Gram(s) IV Push once  dextrose Oral Gel 15 Gram(s) Oral once PRN  glucagon  Injectable 1 milliGRAM(s) IntraMuscular once  insulin lispro (ADMELOG) corrective regimen sliding scale   SubCutaneous three times a day before meals  insulin lispro (ADMELOG) corrective regimen sliding scale   SubCutaneous at bedtime    dextrose 5%. 1000 milliLiter(s) IV Continuous <Continuous>  dextrose 5%. 1000 milliLiter(s) IV Continuous <Continuous>      FAMILY HISTORY:  No pertinent family history in first degree relatives        Non-contributory    SOCIAL HISTORY:    [ ] not a smoker    Allergies    No Known Allergies    Intolerances    	    REVIEW OF SYSTEMS:  CONSTITUTIONAL: No fever  EYES: No eye pain, visual disturbances, or discharge  ENMT:  No difficulty hearing, tinnitus  NECK: No pain or stiffness  RESPIRATORY: No cough, wheezing,  CARDIOVASCULAR: + chest pain, no palpitations, passing out, dizziness, or leg swelling  GASTROINTESTINAL:  No nausea, vomiting, diarrhea or constipation. No melena.  GENITOURINARY: No dysuria, hematuria  NEUROLOGICAL: No stroke like symptoms  SKIN: No burning or lesions   ENDOCRINE: No heat or cold intolerance  MUSCULOSKELETAL: No joint pain or swelling  PSYCHIATRIC: No  anxiety, mood swings  HEME/LYMPH: No bleeding gums  ALLERGY AND IMMUNOLOGIC: No hives or eczema	    All other ROS negative    PHYSICAL EXAM:  T(C): 36.8 (06-15-22 @ 22:34), Max: 36.8 (06-15-22 @ 15:44)  HR: 55 (06-15-22 @ 22:34) (55 - 85)  BP: 146/69 (06-15-22 @ 22:34) (137/87 - 146/69)  RR: 16 (06-15-22 @ 22:34) (16 - 18)  SpO2: 100% (06-15-22 @ 22:34) (98% - 100%)  Wt(kg): --  I&O's Summary      Appearance: Normal	  HEENT:   Normal oral mucosa, EOMI	  Cardiovascular:  S1 S2, No JVD,    Respiratory: Lungs clear to auscultation	  Psychiatry: Alert  Gastrointestinal:  Soft, Non-tender, + BS	  Skin: No rashes   Neurologic: Non-focal  Extremities:  No edema  Vascular: Peripheral pulses palpable    	    	  	  CARDIAC MARKERS:  Labs personally reviewed by me                                  12.5   9.39  )-----------( 208      ( 15 Asael 2022 15:00 )             38.5     06-15    137  |  102  |  15  ----------------------------<  116<H>  3.5   |  25  |  0.83    Ca    9.2      15 Jun 2022 15:00    TPro  7.2  /  Alb  4.1  /  TBili  0.5  /  DBili  x   /  AST  41<H>  /  ALT  46<H>  /  AlkPhos  106  06-15          EKG: Personally reviewed by me - NSR, concave <1mm YASMANY and TWI in inferior leads       Assessment:  · Assessment	  80 yo male with cad s/p stent on plavix, ischemic cardiomyopathy w/ mild LV dysfunction, paroxysmal afib (not on ac due to bleed in the past?), HTN, Dm2 presents to the ER with acute R sided chest pressure starting around 11AM today. Admitted for further evaluation.     Problem/Plan - 1:  ·  Problem: Non-ST elevation MI (NSTEMI).   ·  Plan: Pt with chest pain, now currently resolved, elevated trop to 85 in the ER. EKG with sinus rhythm w/ LAFB and RBBB and ST segment changes II,III, avF, improved on subsequent ekgs.   - discussed with interventionalist Dr Anderson, plan for cath in AM  -asa/plavix loaded. Heparinized  -trend troponin and ekg  -continue imdur, betablocker, statin.    Problem/Plan - 2:  ·  Problem: PAF (paroxysmal atrial fibrillation).   ·  Plan: currently in sinus rhythm. No longer on warfarin due to a GI bleed in the past reportedly, but denies bleeding now. hb stable  -on heparin as above. continue ppi. monitor for signs of bleed  -continue betablocker  -confirm other meds with [facility] pharmacist.    Problem/Plan - 3:  ·  Problem: Chronic systolic HF (congestive heart failure).   ·  Plan: prior admission pt w/ mild EF reduction, appears euvolemic currently. cxr clear  -confirm lasix dose in AM  -monitor I/O  -resume betablocker.    Problem/Plan - 4:  ·  Problem: HTN (hypertension).   ·  Plan: confirm home meds.         Differential diagnosis and plan of care discussed with patient after the evaluation. Counseling on diet, nutritional counseling, weight management, exercise and medication compliance was done.   Advanced care planning/advanced directives discussed with patient/family. DNR status including forceful chest compressions to attempt to restart the heart, ventilator support/artificial breathing, electric shock, artificial nutrition, health care proxy, Molst form all discussed with pt. Pt wishes to consider. More than fifteen minutes spent on discussing advanced directives.          Prasanna Scott DO [facility]  Cardiovascular Medicine  [address] [place] [address], [address]  Office [phone]  Cell [phone]

## 2022-06-16 ENCOUNTER — TRANSCRIPTION ENCOUNTER (OUTPATIENT)
Age: 82
End: 2022-06-16

## 2022-06-16 VITALS
DIASTOLIC BLOOD PRESSURE: 84 MMHG | TEMPERATURE: 99 F | HEART RATE: 68 BPM | OXYGEN SATURATION: 100 % | RESPIRATION RATE: 16 BRPM | SYSTOLIC BLOOD PRESSURE: 155 MMHG

## 2022-06-16 LAB
A1C WITH ESTIMATED AVERAGE GLUCOSE RESULT: 7 % — HIGH (ref 4–5.6)
ALBUMIN SERPL ELPH-MCNC: 3.7 G/DL — SIGNIFICANT CHANGE UP (ref 3.3–5)
ALP SERPL-CCNC: 95 U/L — SIGNIFICANT CHANGE UP (ref 40–120)
ALT FLD-CCNC: 42 U/L — HIGH (ref 4–41)
ANION GAP SERPL CALC-SCNC: 10 MMOL/L — SIGNIFICANT CHANGE UP (ref 7–14)
APTT BLD: 142.9 SEC — SIGNIFICANT CHANGE UP (ref 27–36.3)
APTT BLD: 53.8 SEC — HIGH (ref 27–36.3)
AST SERPL-CCNC: 47 U/L — HIGH (ref 4–40)
BASOPHILS # BLD AUTO: 0.01 K/UL — SIGNIFICANT CHANGE UP (ref 0–0.2)
BASOPHILS NFR BLD AUTO: 0.1 % — SIGNIFICANT CHANGE UP (ref 0–2)
BILIRUB DIRECT SERPL-MCNC: <0.2 MG/DL — SIGNIFICANT CHANGE UP (ref 0–0.3)
BILIRUB INDIRECT FLD-MCNC: >0.4 MG/DL — SIGNIFICANT CHANGE UP (ref 0–1)
BILIRUB SERPL-MCNC: 0.6 MG/DL — SIGNIFICANT CHANGE UP (ref 0.2–1.2)
BUN SERPL-MCNC: 15 MG/DL — SIGNIFICANT CHANGE UP (ref 7–23)
CALCIUM SERPL-MCNC: 9.2 MG/DL — SIGNIFICANT CHANGE UP (ref 8.4–10.5)
CHLORIDE SERPL-SCNC: 105 MMOL/L — SIGNIFICANT CHANGE UP (ref 98–107)
CHOLEST SERPL-MCNC: 137 MG/DL — SIGNIFICANT CHANGE UP
CO2 SERPL-SCNC: 25 MMOL/L — SIGNIFICANT CHANGE UP (ref 22–31)
CREAT SERPL-MCNC: 0.79 MG/DL — SIGNIFICANT CHANGE UP (ref 0.5–1.3)
EGFR: 90 ML/MIN/1.73M2 — SIGNIFICANT CHANGE UP
EOSINOPHIL # BLD AUTO: 0.19 K/UL — SIGNIFICANT CHANGE UP (ref 0–0.5)
EOSINOPHIL NFR BLD AUTO: 2.4 % — SIGNIFICANT CHANGE UP (ref 0–6)
ESTIMATED AVERAGE GLUCOSE: 154 — SIGNIFICANT CHANGE UP
GLUCOSE BLDC GLUCOMTR-MCNC: 103 MG/DL — HIGH (ref 70–99)
GLUCOSE BLDC GLUCOMTR-MCNC: 109 MG/DL — HIGH (ref 70–99)
GLUCOSE BLDC GLUCOMTR-MCNC: 163 MG/DL — HIGH (ref 70–99)
GLUCOSE BLDC GLUCOMTR-MCNC: 87 MG/DL — SIGNIFICANT CHANGE UP (ref 70–99)
GLUCOSE BLDC GLUCOMTR-MCNC: 99 MG/DL — SIGNIFICANT CHANGE UP (ref 70–99)
GLUCOSE SERPL-MCNC: 115 MG/DL — HIGH (ref 70–99)
HCT VFR BLD CALC: 37 % — LOW (ref 39–50)
HCT VFR BLD CALC: 38.3 % — LOW (ref 39–50)
HDLC SERPL-MCNC: 57 MG/DL — SIGNIFICANT CHANGE UP
HGB BLD-MCNC: 11.9 G/DL — LOW (ref 13–17)
HGB BLD-MCNC: 12.3 G/DL — LOW (ref 13–17)
IANC: 4.1 K/UL — SIGNIFICANT CHANGE UP (ref 1.8–7.4)
IMM GRANULOCYTES NFR BLD AUTO: 0.2 % — SIGNIFICANT CHANGE UP (ref 0–1.5)
INR BLD: 1.14 RATIO — SIGNIFICANT CHANGE UP (ref 0.88–1.16)
LIPID PNL WITH DIRECT LDL SERPL: 70 MG/DL — SIGNIFICANT CHANGE UP
LYMPHOCYTES # BLD AUTO: 2.92 K/UL — SIGNIFICANT CHANGE UP (ref 1–3.3)
LYMPHOCYTES # BLD AUTO: 36.2 % — SIGNIFICANT CHANGE UP (ref 13–44)
MAGNESIUM SERPL-MCNC: 1.9 MG/DL — SIGNIFICANT CHANGE UP (ref 1.6–2.6)
MCHC RBC-ENTMCNC: 26.9 PG — LOW (ref 27–34)
MCHC RBC-ENTMCNC: 27 PG — SIGNIFICANT CHANGE UP (ref 27–34)
MCHC RBC-ENTMCNC: 32.1 GM/DL — SIGNIFICANT CHANGE UP (ref 32–36)
MCHC RBC-ENTMCNC: 32.2 GM/DL — SIGNIFICANT CHANGE UP (ref 32–36)
MCV RBC AUTO: 83.8 FL — SIGNIFICANT CHANGE UP (ref 80–100)
MCV RBC AUTO: 84.1 FL — SIGNIFICANT CHANGE UP (ref 80–100)
MONOCYTES # BLD AUTO: 0.82 K/UL — SIGNIFICANT CHANGE UP (ref 0–0.9)
MONOCYTES NFR BLD AUTO: 10.2 % — SIGNIFICANT CHANGE UP (ref 2–14)
NEUTROPHILS # BLD AUTO: 4.1 K/UL — SIGNIFICANT CHANGE UP (ref 1.8–7.4)
NEUTROPHILS NFR BLD AUTO: 50.9 % — SIGNIFICANT CHANGE UP (ref 43–77)
NON HDL CHOLESTEROL: 80 MG/DL — SIGNIFICANT CHANGE UP
NRBC # BLD: 0 /100 WBCS — SIGNIFICANT CHANGE UP
NRBC # BLD: 0 /100 WBCS — SIGNIFICANT CHANGE UP
NRBC # FLD: 0 K/UL — SIGNIFICANT CHANGE UP
NRBC # FLD: 0 K/UL — SIGNIFICANT CHANGE UP
PHOSPHATE SERPL-MCNC: 3.6 MG/DL — SIGNIFICANT CHANGE UP (ref 2.5–4.5)
PLATELET # BLD AUTO: 191 K/UL — SIGNIFICANT CHANGE UP (ref 150–400)
PLATELET # BLD AUTO: 191 K/UL — SIGNIFICANT CHANGE UP (ref 150–400)
POTASSIUM SERPL-MCNC: 3.9 MMOL/L — SIGNIFICANT CHANGE UP (ref 3.5–5.3)
POTASSIUM SERPL-SCNC: 3.9 MMOL/L — SIGNIFICANT CHANGE UP (ref 3.5–5.3)
PROT SERPL-MCNC: 6.9 G/DL — SIGNIFICANT CHANGE UP (ref 6–8.3)
PROTHROM AB SERPL-ACNC: 13.2 SEC — SIGNIFICANT CHANGE UP (ref 10.5–13.4)
RBC # BLD: 4.4 M/UL — SIGNIFICANT CHANGE UP (ref 4.2–5.8)
RBC # BLD: 4.57 M/UL — SIGNIFICANT CHANGE UP (ref 4.2–5.8)
RBC # FLD: 16 % — HIGH (ref 10.3–14.5)
RBC # FLD: 16.2 % — HIGH (ref 10.3–14.5)
SODIUM SERPL-SCNC: 140 MMOL/L — SIGNIFICANT CHANGE UP (ref 135–145)
TRIGL SERPL-MCNC: 49 MG/DL — SIGNIFICANT CHANGE UP
TSH SERPL-MCNC: 2.08 UIU/ML — SIGNIFICANT CHANGE UP (ref 0.27–4.2)
WBC # BLD: 7.17 K/UL — SIGNIFICANT CHANGE UP (ref 3.8–10.5)
WBC # BLD: 8.06 K/UL — SIGNIFICANT CHANGE UP (ref 3.8–10.5)
WBC # FLD AUTO: 7.17 K/UL — SIGNIFICANT CHANGE UP (ref 3.8–10.5)
WBC # FLD AUTO: 8.06 K/UL — SIGNIFICANT CHANGE UP (ref 3.8–10.5)

## 2022-06-16 PROCEDURE — 93306 TTE W/DOPPLER COMPLETE: CPT | Mod: 26

## 2022-06-16 PROCEDURE — 93454 CORONARY ARTERY ANGIO S&I: CPT | Mod: 26

## 2022-06-16 RX ORDER — LABETALOL HCL 100 MG
10 TABLET ORAL ONCE
Refills: 0 | Status: COMPLETED | OUTPATIENT
Start: 2022-06-16 | End: 2022-06-16

## 2022-06-16 RX ORDER — ISOSORBIDE MONONITRATE 60 MG/1
1 TABLET, EXTENDED RELEASE ORAL
Qty: 0 | Refills: 0 | DISCHARGE
Start: 2022-06-16

## 2022-06-16 RX ORDER — ATORVASTATIN CALCIUM 80 MG/1
1 TABLET, FILM COATED ORAL
Qty: 0 | Refills: 0 | DISCHARGE
Start: 2022-06-16

## 2022-06-16 RX ORDER — LISINOPRIL 2.5 MG/1
2.5 TABLET ORAL DAILY
Refills: 0 | Status: DISCONTINUED | OUTPATIENT
Start: 2022-06-16 | End: 2022-06-16

## 2022-06-16 RX ORDER — ISOSORBIDE MONONITRATE 60 MG/1
1 TABLET, EXTENDED RELEASE ORAL
Qty: 0 | Refills: 0 | DISCHARGE

## 2022-06-16 RX ORDER — PANTOPRAZOLE SODIUM 20 MG/1
1 TABLET, DELAYED RELEASE ORAL
Qty: 0 | Refills: 0 | DISCHARGE
Start: 2022-06-16

## 2022-06-16 RX ORDER — CARVEDILOL PHOSPHATE 80 MG/1
1 CAPSULE, EXTENDED RELEASE ORAL
Qty: 0 | Refills: 0 | DISCHARGE
Start: 2022-06-16

## 2022-06-16 RX ORDER — ASPIRIN/CALCIUM CARB/MAGNESIUM 324 MG
1 TABLET ORAL
Qty: 0 | Refills: 0 | DISCHARGE
Start: 2022-06-16

## 2022-06-16 RX ORDER — LISINOPRIL 2.5 MG/1
1 TABLET ORAL
Qty: 0 | Refills: 0 | DISCHARGE
Start: 2022-06-16

## 2022-06-16 RX ORDER — TAMSULOSIN HYDROCHLORIDE 0.4 MG/1
1 CAPSULE ORAL
Qty: 0 | Refills: 0 | DISCHARGE
Start: 2022-06-16

## 2022-06-16 RX ORDER — HYDRALAZINE HCL 50 MG
5 TABLET ORAL ONCE
Refills: 0 | Status: COMPLETED | OUTPATIENT
Start: 2022-06-16 | End: 2022-06-16

## 2022-06-16 RX ADMIN — ATORVASTATIN CALCIUM 80 MILLIGRAM(S): 80 TABLET, FILM COATED ORAL at 22:20

## 2022-06-16 RX ADMIN — HEPARIN SODIUM 600 UNIT(S)/HR: 5000 INJECTION INTRAVENOUS; SUBCUTANEOUS at 09:10

## 2022-06-16 RX ADMIN — CLOPIDOGREL BISULFATE 75 MILLIGRAM(S): 75 TABLET, FILM COATED ORAL at 14:28

## 2022-06-16 RX ADMIN — HEPARIN SODIUM 0 UNIT(S)/HR: 5000 INJECTION INTRAVENOUS; SUBCUTANEOUS at 01:21

## 2022-06-16 RX ADMIN — Medication 10 MILLIGRAM(S): at 17:42

## 2022-06-16 RX ADMIN — PANTOPRAZOLE SODIUM 40 MILLIGRAM(S): 20 TABLET, DELAYED RELEASE ORAL at 05:54

## 2022-06-16 RX ADMIN — CARVEDILOL PHOSPHATE 6.25 MILLIGRAM(S): 80 CAPSULE, EXTENDED RELEASE ORAL at 18:20

## 2022-06-16 RX ADMIN — ISOSORBIDE MONONITRATE 30 MILLIGRAM(S): 60 TABLET, EXTENDED RELEASE ORAL at 22:19

## 2022-06-16 RX ADMIN — TAMSULOSIN HYDROCHLORIDE 0.4 MILLIGRAM(S): 0.4 CAPSULE ORAL at 22:19

## 2022-06-16 RX ADMIN — HEPARIN SODIUM 600 UNIT(S)/HR: 5000 INJECTION INTRAVENOUS; SUBCUTANEOUS at 08:31

## 2022-06-16 RX ADMIN — HEPARIN SODIUM 600 UNIT(S)/HR: 5000 INJECTION INTRAVENOUS; SUBCUTANEOUS at 02:26

## 2022-06-16 RX ADMIN — Medication 5 MILLIGRAM(S): at 18:15

## 2022-06-16 RX ADMIN — Medication 81 MILLIGRAM(S): at 14:28

## 2022-06-16 NOTE — DISCHARGE NOTE PROVIDER - NSDCMRMEDTOKEN_GEN_ALL_CORE_FT
aspirin 81 mg oral delayed release tablet: 1 tab(s) orally once a day  atorvastatin 80 mg oral tablet: 1 tab(s) orally once a day (at bedtime)  carvedilol 6.25 mg oral tablet: 1 tab(s) orally every 12 hours  clopidogrel 75 mg oral tablet: 1 tab(s) orally once a day  ferrous sulfate 325 mg (65 mg elemental iron) oral delayed release tablet: 1 tab(s) orally once a day  furosemide 80 mg oral tablet: 1 tab(s) orally once a day  isosorbide mononitrate 30 mg oral tablet, extended release: 1 tab(s) orally once a day (in the morning)  lisinopril 40 mg oral tablet: 1 tab(s) orally once a day  potassium chloride 20 mEq oral tablet, extended release: 1 tab(s) orally 2 times a day  spironolactone 25 mg oral tablet: 1 tab(s) orally once a day   aspirin 81 mg oral tablet, chewable: 1 tab(s) orally once a day  atorvastatin 80 mg oral tablet: 1 tab(s) orally once a day (at bedtime)  carvedilol 6.25 mg oral tablet: 1 tab(s) orally every 12 hours  ferrous sulfate 325 mg (65 mg elemental iron) oral delayed release tablet: 1 tab(s) orally once a day  furosemide 80 mg oral tablet: 1 tab(s) orally once a day  isosorbide mononitrate 30 mg oral tablet, extended release: 1 tab(s) orally once a day  lisinopril 2.5 mg oral tablet: 1 tab(s) orally once a day  pantoprazole 40 mg oral delayed release tablet: 1 tab(s) orally once a day (before a meal)  potassium chloride 20 mEq oral tablet, extended release: 1 tab(s) orally 2 times a day  spironolactone 25 mg oral tablet: 1 tab(s) orally once a day  tamsulosin 0.4 mg oral capsule: 1 cap(s) orally once a day (at bedtime)

## 2022-06-16 NOTE — PATIENT PROFILE ADULT - NSPROHMDIABETMGMTSTRAT_GEN_A_NUR
activity/adequate rest/blood glucose testing/coping strategies/diet modification/exercise/medication therapy/routine screenings/weight management

## 2022-06-16 NOTE — DISCHARGE NOTE NURSING/CASE MANAGEMENT/SOCIAL WORK - PATIENT PORTAL LINK FT
You can access the FollowMyHealth Patient Portal offered by [facility] by registering at the following website: [email or web address]. By joining [facility]’s FollowMyHealth portal, you will also be able to view your health information using other applications (apps) compatible with our system.

## 2022-06-16 NOTE — DISCHARGE NOTE PROVIDER - NSDCCPCAREPLAN_GEN_ALL_CORE_FT
PRINCIPAL DISCHARGE DIAGNOSIS  Diagnosis: NSTEMI (non-ST elevation myocardial infarction)  Assessment and Plan of Treatment: You underwent a procedure known as cardiac catheterization, the results of the procedure showed that you require further evaulation for another procedure known as a Coronary artery Bypass Graft.  This procedure is performed at [facility], you are being transferred there to be evaluated for this procedure.  You were consented for transfer to [facility].      SECONDARY DISCHARGE DIAGNOSES  Diagnosis: HTN (hypertension)  Assessment and Plan of Treatment: Continue taking your home medications for high blood pressure, avoid excess salt with your diet.  Follow-up with your primary care provider regarding this condition    Diagnosis: CHF (congestive heart failure)  Assessment and Plan of Treatment:     Diagnosis: PAF (paroxysmal atrial fibrillation)  Assessment and Plan of Treatment:     Diagnosis: DM (diabetes mellitus)  Assessment and Plan of Treatment:     Diagnosis: Need for prophylactic measure  Assessment and Plan of Treatment:      PRINCIPAL DISCHARGE DIAGNOSIS  Diagnosis: NSTEMI (non-ST elevation myocardial infarction)  Assessment and Plan of Treatment: You underwent a procedure known as cardiac catheterization, the results of the procedure showed that you require further evaulation for another procedure known as a Coronary Artery Bypass Graft.  This procedure is performed at [facility], you are being transferred there to be evaluated for this procedure.  You were consented for transfer to [facility].      SECONDARY DISCHARGE DIAGNOSES  Diagnosis: HTN (hypertension)  Assessment and Plan of Treatment: Continue taking your home medications for high blood pressure, avoid excess salt with your diet.  Follow-up with your primary care provider regarding this condition.    Diagnosis: CHF (congestive heart failure)  Assessment and Plan of Treatment: You have a history of heart failure.   Continue with home medications as ordered.  Avoid consuming large amounts of fluids.    Diagnosis: PAF (paroxysmal atrial fibrillation)  Assessment and Plan of Treatment: Continue with your home medications, follow-up with your cardiologist    Diagnosis: DM (diabetes mellitus)  Assessment and Plan of Treatment: We are monitoring your fingersticks while you are in the hospital, insulin is used if your sugars are too high.  Follow-up with your primary doctor.    Diagnosis: Need for prophylactic measure  Assessment and Plan of Treatment: You were on a heparin drip before your cardiac catheterization procedure, your doctors will determine if any additional medications are needed while in the hospital.

## 2022-06-16 NOTE — DISCHARGE NOTE PROVIDER - HOSPITAL COURSE
82 yo male with cad s/p stent on plavix, ischemic cardiomyopathy w/ mild LV dysfunction, paroxysmal afib (not on ac due to bleed in the past?), HTN, Dm2 presents to the ER with acute R sided chest pressure starting around 11AM today. Admitted for further evaluation.      Problem/Plan - 1:  ·  Problem: Non-ST elevation MI (NSTEMI).   ·  Plan: Pt with chest pain, now currently resolved, elevated trop to 85 in the ER. EKG with sinus rhythm w/ LAFB and RBBB and ST segment changes II,III, avF, improved on subsequent ekgs. Spoke with Dr. Scott who had spoken to cath consult, who determined that cath can be for the morning.  -asa/plavix loaded. Heparinized  -trend troponin and ekg  -continue imdur, betablocker, statin.     Problem/Plan - 2:  ·  Problem: PAF (paroxysmal atrial fibrillation).   ·  Plan: currently in sinus rhythm. No longer on warfarin due to a GI bleed in the past reportedly, but denies bleeding now. hb stable  -on heparin as above. continue ppi. monitor for signs of bleed  -continue betablocker  -confirm other meds with [facility] pharmacist.     Problem/Plan - 3:  ·  Problem: CHF (congestive heart failure).   ·  Plan: prior admission pt w/ mild EF reduction, appears euvolemic currently. cxr clear  -confirm lasix dose in AM  -monitor I/O  -resume betablocker.     Problem/Plan - 4:  ·  Problem: HTN (hypertension).   ·  Plan: confirm home meds.     Problem/Plan - 5:  ·  Problem: DM (diabetes mellitus).   ·  Plan: start sliding scale for now, check a1c.     Problem/Plan - 6:  ·  Problem: Need for prophylactic measure.   ·  Plan: heparin gtt, diabetic/dash diet, npo post midnight.    Patient being transferred to [facility] for CABG evaluation, accepting physician is Dr. Nolan Perez from [facility].  Spoke to the patient at bedside, he is A&Ox3, risks and benefits of transfer reviewed with patient.  Patient expressed understanding with need for transfer, transfer consent form signed by patient and witnessed by RN.  Pt requested his son, Kyle, be updated - [phone].    On 6/16/22, case was discussed with attending physician, Dr. Salinas, patient is stable for transfer to [facility] for CABG evaluation. 82 yo male with cad s/p stent on plavix, ischemic cardiomyopathy w/ mild LV dysfunction, paroxysmal afib (not on ac due to bleed in the past?), HTN, Dm2 presents to the ER with acute R sided chest pressure starting around 11AM today. Admitted for further evaluation.      Problem/Plan - 1:  ·  Problem: Non-ST elevation MI (NSTEMI).   ·  Plan: Pt with chest pain, now currently resolved, elevated trop to 85 in the ER. EKG with sinus rhythm w/ LAFB and RBBB and ST segment changes II,III, avF, improved on subsequent ekgs. Spoke with Dr. Scott who had spoken to cath consult, who determined that cath can be for the morning.  -asa/plavix loaded. Heparinized  -trend troponin and ekg  -continue imdur, betablocker, statin.  -LHC done on 6/16, results to be placed into EMR.     Problem/Plan - 2:  ·  Problem: PAF (paroxysmal atrial fibrillation).   ·  Plan: currently in sinus rhythm. No longer on warfarin due to a GI bleed in the past reportedly, but denies bleeding now. hb stable  -on heparin as above. continue ppi. monitor for signs of bleed  -continue betablocker  -confirm other meds with [facility] pharmacist.     Problem/Plan - 3:  ·  Problem: CHF (congestive heart failure).   ·  Plan: prior admission pt w/ mild EF reduction, appears euvolemic currently. cxr clear  -confirm lasix dose in AM  -monitor I/O  -resume betablocker.     Problem/Plan - 4:  ·  Problem: HTN (hypertension).   ·  Plan: confirm home meds.     Problem/Plan - 5:  ·  Problem: DM (diabetes mellitus).   ·  Plan: start sliding scale for now, check a1c.     Problem/Plan - 6:  ·  Problem: Need for prophylactic measure.   ·  Plan: heparin gtt, diabetic/dash diet, npo post midnight.    Patient being transferred to [facility] for CABG evaluation, accepting physician is Dr. Nolan Perez from [facility] per [facility].  Spoke to the patient at bedside, he is A&Ox3, risks and benefits of transfer reviewed with patient.  Patient expressed understanding with need for transfer, transfer consent form signed by patient and witnessed by RN.  Pt requested his son, Kyle, be updated - [phone], updated by writer.    Plavix was discontinued as patient is being planned for CABG - this medication change was discussed with cardiologist, Dr. Scott.    On 6/16/22, case was discussed with attending physician, Dr. Salinas, patient is stable for transfer to [facility] for CABG evaluation.

## 2022-06-16 NOTE — DISCHARGE NOTE NURSING/CASE MANAGEMENT/SOCIAL WORK - NSDCPEFALRISK_GEN_ALL_CORE
For information on Fall & Injury Prevention, visit: [email or web address] OR  [email or web address] OR  https://www.cdc.gov/steadi/patient.html

## 2022-06-16 NOTE — PATIENT PROFILE ADULT - FALL HARM RISK - HARM RISK INTERVENTIONS
Assistance with ambulation/Communicate Risk of Fall with Harm to all staff/Monitor for mental status changes/Monitor gait and stability/Move patient closer to nurses' station/Provide patient with walking aids - walker, cane, crutches/Reinforce activity limits and safety measures with patient and family/Reorient to person, place and time as needed/Review medications for side effects contributing to fall risk/Sit up slowly, dangle for a short time, stand at bedside before walking/Tailored Fall Risk Interventions/Visual Cue: Yellow wristband and red socks/Bed in lowest position, wheels locked, appropriate side rails in place/Call bell, personal items and telephone in reach/Instruct patient to call for assistance before getting out of bed or chair/Non-slip footwear when patient is out of bed/[place] to call system/Physically safe environment - no spills, clutter or unnecessary equipment/Purposeful Proactive Rounding/Room/bathroom lighting operational, light cord in reach

## 2022-06-16 NOTE — CHART NOTE - NSCHARTNOTEFT_GEN_A_CORE
[facility] NIGHT MEDICINE COVERAGE    BOOGIE SY  [id number]    Patient status post [facility] via Right Femoral Artery access.  Site clean, dry, and intact.  No hematoma or active bleeding.  Old blood noted on gauze no evidence of active bleeding noted.  Extremities warm to touch.  Pulses present b/l, capillary refill appropriate.  No bruits noted on ausculation of femoral artery.  Denies any pain, chest pain, dizziness, visual changes, numbness, or tingling.  Will continue to monitor.    T(C): 37.1 (06-16-22 @ 20:29), Max: 37.1 (06-16-22 @ 20:29)  HR: 68 (06-16-22 @ 20:29) (54 - 68)  BP: 155/84 (06-16-22 @ 20:29) (141/91 - 155/84)  RR: 16 (06-16-22 @ 20:29) (14 - 16)  SpO2: 100% (06-16-22 @ 20:29) (100% - 100%)    Rafa Paulson PA-C  Department of Medicine - [facility]  In-House Pager: #[phone]

## 2022-06-16 NOTE — PATIENT PROFILE ADULT - FUNCTIONAL ASSESSMENT - BASIC MOBILITY 5.
Render Post-Care Instructions In Note?: no Biopsy Method: Dermablade Hemostasis: Drysol Silver Nitrate Text: The wound bed was treated with silver nitrate after the biopsy was performed. Detail Level: Detailed Dressing: bandage Was A Bandage Applied: Yes Lab: [id number] Billing Type: Third-Party Bill Curettage Text: The wound bed was treated with curettage after the biopsy was performed. Depth Of Biopsy: dermis Wound Care: Petrolatum X Size Of Lesion In Cm: 0 Anesthesia Type: 1% lidocaine without epinephrine and a 1:10 solution of 8.4% sodium bicarbonate Size Of Lesion In Cm: 0.4 Post-Care Instructions: I reviewed with the patient in detail post-care instructions. Patient is to keep the biopsy site dry overnight, and then apply petrolatum jelly twice daily until healed. Cryotherapy Text: The wound bed was treated with cryotherapy after the biopsy was performed. Lab Facility: [id number] Consent: Written consent was obtained and risks were reviewed including but not limited to scarring, infection, bleeding, scabbing, incomplete removal, nerve damage and allergy to anesthesia. Type Of Destruction Used: Curettage Anesthesia Volume In Cc (Will Not Render If 0): 0.5 Electrodesiccation And Curettage Text: The wound bed was treated with electrodesiccation and curettage after the biopsy was performed. Electrodesiccation Text: The wound bed was treated with electrodesiccation after the biopsy was performed. Notification Instructions: Patient will be notified of biopsy results. However, patient instructed to call the office if not contacted within 2 weeks. Biopsy Type: H and E 4 = No assist / stand by assistance

## 2022-06-16 NOTE — DISCHARGE NOTE PROVIDER - CARE PROVIDER_API CALL
Prasanna Scott (DO)  Cardiovascular Disease; Internal Medicine; Nuclear Cardiology  [address] [place] [address], [place] [address]  Phone: [phone]  Fax: [phone]  Follow Up Time:

## 2022-06-17 ENCOUNTER — INPATIENT (INPATIENT)
Facility: HOSPITAL | Age: 82
LOS: 11 days | Discharge: HOME HEALTH | DRG: 219 | End: 2022-06-29
Attending: THORACIC SURGERY (CARDIOTHORACIC VASCULAR SURGERY) | Admitting: THORACIC SURGERY (CARDIOTHORACIC VASCULAR SURGERY)
Payer: COMMERCIAL

## 2022-06-17 VITALS
WEIGHT: 151.68 LBS | RESPIRATION RATE: 18 BRPM | DIASTOLIC BLOOD PRESSURE: 92 MMHG | SYSTOLIC BLOOD PRESSURE: 164 MMHG | HEART RATE: 73 BPM | TEMPERATURE: 99 F | HEIGHT: 67 IN | OXYGEN SATURATION: 99 %

## 2022-06-17 DIAGNOSIS — Z98.890 OTHER SPECIFIED POSTPROCEDURAL STATES: Chronic | ICD-10-CM

## 2022-06-17 DIAGNOSIS — I25.10 ATHEROSCLEROTIC HEART DISEASE OF NATIVE CORONARY ARTERY WITHOUT ANGINA PECTORIS: ICD-10-CM

## 2022-06-17 DIAGNOSIS — I10 ESSENTIAL (PRIMARY) HYPERTENSION: ICD-10-CM

## 2022-06-17 DIAGNOSIS — I50.9 HEART FAILURE, UNSPECIFIED: ICD-10-CM

## 2022-06-17 DIAGNOSIS — Z95.5 PRESENCE OF CORONARY ANGIOPLASTY IMPLANT AND GRAFT: Chronic | ICD-10-CM

## 2022-06-17 DIAGNOSIS — E11.9 TYPE 2 DIABETES MELLITUS WITHOUT COMPLICATIONS: ICD-10-CM

## 2022-06-17 LAB
A1C WITH ESTIMATED AVERAGE GLUCOSE RESULT: 7.2 % — HIGH (ref 4–5.6)
A1C WITH ESTIMATED AVERAGE GLUCOSE RESULT: 7.2 % — HIGH (ref 4–5.6)
ALBUMIN SERPL ELPH-MCNC: 4.1 G/DL — SIGNIFICANT CHANGE UP (ref 3.3–5)
ALP SERPL-CCNC: 101 U/L — SIGNIFICANT CHANGE UP (ref 40–120)
ALT FLD-CCNC: 38 U/L — SIGNIFICANT CHANGE UP (ref 10–45)
ANION GAP SERPL CALC-SCNC: 13 MMOL/L — SIGNIFICANT CHANGE UP (ref 5–17)
APPEARANCE UR: CLEAR — SIGNIFICANT CHANGE UP
APTT BLD: 34.9 SEC — SIGNIFICANT CHANGE UP (ref 27.5–35.5)
AST SERPL-CCNC: 37 U/L — SIGNIFICANT CHANGE UP (ref 10–40)
BACTERIA # UR AUTO: NEGATIVE — SIGNIFICANT CHANGE UP
BASOPHILS # BLD AUTO: 0.02 K/UL — SIGNIFICANT CHANGE UP (ref 0–0.2)
BASOPHILS NFR BLD AUTO: 0.2 % — SIGNIFICANT CHANGE UP (ref 0–2)
BILIRUB SERPL-MCNC: 1 MG/DL — SIGNIFICANT CHANGE UP (ref 0.2–1.2)
BILIRUB UR-MCNC: NEGATIVE — SIGNIFICANT CHANGE UP
BLD GP AB SCN SERPL QL: NEGATIVE — SIGNIFICANT CHANGE UP
BUN SERPL-MCNC: 12 MG/DL — SIGNIFICANT CHANGE UP (ref 7–23)
CALCIUM SERPL-MCNC: 10.1 MG/DL — SIGNIFICANT CHANGE UP (ref 8.4–10.5)
CHLORIDE SERPL-SCNC: 102 MMOL/L — SIGNIFICANT CHANGE UP (ref 96–108)
CO2 SERPL-SCNC: 23 MMOL/L — SIGNIFICANT CHANGE UP (ref 22–31)
COLOR SPEC: YELLOW — SIGNIFICANT CHANGE UP
CREAT SERPL-MCNC: 0.81 MG/DL — SIGNIFICANT CHANGE UP (ref 0.5–1.3)
DIFF PNL FLD: NEGATIVE — SIGNIFICANT CHANGE UP
EGFR: 90 ML/MIN/1.73M2 — SIGNIFICANT CHANGE UP
EOSINOPHIL # BLD AUTO: 0.12 K/UL — SIGNIFICANT CHANGE UP (ref 0–0.5)
EOSINOPHIL NFR BLD AUTO: 1.4 % — SIGNIFICANT CHANGE UP (ref 0–6)
EPI CELLS # UR: 1 /HPF — SIGNIFICANT CHANGE UP
ESTIMATED AVERAGE GLUCOSE: 160 MG/DL — HIGH (ref 68–114)
ESTIMATED AVERAGE GLUCOSE: 160 MG/DL — HIGH (ref 68–114)
FIBRINOGEN PPP-MCNC: 631 MG/DL — HIGH (ref 330–520)
GLUCOSE BLDC GLUCOMTR-MCNC: 119 MG/DL — HIGH (ref 70–99)
GLUCOSE BLDC GLUCOMTR-MCNC: 131 MG/DL — HIGH (ref 70–99)
GLUCOSE BLDC GLUCOMTR-MCNC: 135 MG/DL — HIGH (ref 70–99)
GLUCOSE BLDC GLUCOMTR-MCNC: 143 MG/DL — HIGH (ref 70–99)
GLUCOSE SERPL-MCNC: 122 MG/DL — HIGH (ref 70–99)
GLUCOSE UR QL: NEGATIVE — SIGNIFICANT CHANGE UP
HCT VFR BLD CALC: 42.1 % — SIGNIFICANT CHANGE UP (ref 39–50)
HGB BLD-MCNC: 13.5 G/DL — SIGNIFICANT CHANGE UP (ref 13–17)
HYALINE CASTS # UR AUTO: 0 /LPF — SIGNIFICANT CHANGE UP (ref 0–2)
IMM GRANULOCYTES NFR BLD AUTO: 0.5 % — SIGNIFICANT CHANGE UP (ref 0–1.5)
INR BLD: 1.17 RATIO — HIGH (ref 0.88–1.16)
KETONES UR-MCNC: ABNORMAL
LEUKOCYTE ESTERASE UR-ACNC: NEGATIVE — SIGNIFICANT CHANGE UP
LYMPHOCYTES # BLD AUTO: 2.12 K/UL — SIGNIFICANT CHANGE UP (ref 1–3.3)
LYMPHOCYTES # BLD AUTO: 25.2 % — SIGNIFICANT CHANGE UP (ref 13–44)
MCHC RBC-ENTMCNC: 26.8 PG — LOW (ref 27–34)
MCHC RBC-ENTMCNC: 32.1 GM/DL — SIGNIFICANT CHANGE UP (ref 32–36)
MCV RBC AUTO: 83.7 FL — SIGNIFICANT CHANGE UP (ref 80–100)
MONOCYTES # BLD AUTO: 0.73 K/UL — SIGNIFICANT CHANGE UP (ref 0–0.9)
MONOCYTES NFR BLD AUTO: 8.7 % — SIGNIFICANT CHANGE UP (ref 2–14)
MRSA PCR RESULT.: SIGNIFICANT CHANGE UP
NEUTROPHILS # BLD AUTO: 5.37 K/UL — SIGNIFICANT CHANGE UP (ref 1.8–7.4)
NEUTROPHILS NFR BLD AUTO: 64 % — SIGNIFICANT CHANGE UP (ref 43–77)
NITRITE UR-MCNC: NEGATIVE — SIGNIFICANT CHANGE UP
NRBC # BLD: 0 /100 WBCS — SIGNIFICANT CHANGE UP (ref 0–0)
NT-PROBNP SERPL-SCNC: 2159 PG/ML — HIGH (ref 0–300)
PA ADP PRP-ACNC: 123 PRU — LOW (ref 194–417)
PH UR: 6.5 — SIGNIFICANT CHANGE UP (ref 5–8)
PLATELET # BLD AUTO: 210 K/UL — SIGNIFICANT CHANGE UP (ref 150–400)
POTASSIUM SERPL-MCNC: 4.2 MMOL/L — SIGNIFICANT CHANGE UP (ref 3.5–5.3)
POTASSIUM SERPL-SCNC: 4.2 MMOL/L — SIGNIFICANT CHANGE UP (ref 3.5–5.3)
PROT SERPL-MCNC: 7.7 G/DL — SIGNIFICANT CHANGE UP (ref 6–8.3)
PROT UR-MCNC: ABNORMAL
PROTHROM AB SERPL-ACNC: 13.6 SEC — HIGH (ref 10.5–13.4)
RBC # BLD: 5.03 M/UL — SIGNIFICANT CHANGE UP (ref 4.2–5.8)
RBC # FLD: 16.1 % — HIGH (ref 10.3–14.5)
RBC CASTS # UR COMP ASSIST: 2 /HPF — SIGNIFICANT CHANGE UP (ref 0–4)
RH IG SCN BLD-IMP: POSITIVE — SIGNIFICANT CHANGE UP
S AUREUS DNA NOSE QL NAA+PROBE: DETECTED
SARS-COV-2 RNA SPEC QL NAA+PROBE: SIGNIFICANT CHANGE UP
SODIUM SERPL-SCNC: 138 MMOL/L — SIGNIFICANT CHANGE UP (ref 135–145)
SP GR SPEC: 1.02 — SIGNIFICANT CHANGE UP (ref 1.01–1.02)
T4 FREE SERPL-MCNC: 1.2 NG/DL — SIGNIFICANT CHANGE UP (ref 0.9–1.8)
TSH SERPL-MCNC: 2.49 UIU/ML — SIGNIFICANT CHANGE UP (ref 0.27–4.2)
UROBILINOGEN FLD QL: ABNORMAL
WBC # BLD: 8.4 K/UL — SIGNIFICANT CHANGE UP (ref 3.8–10.5)
WBC # FLD AUTO: 8.4 K/UL — SIGNIFICANT CHANGE UP (ref 3.8–10.5)
WBC UR QL: 1 /HPF — SIGNIFICANT CHANGE UP (ref 0–5)

## 2022-06-17 PROCEDURE — 71045 X-RAY EXAM CHEST 1 VIEW: CPT | Mod: 26

## 2022-06-17 PROCEDURE — 94010 BREATHING CAPACITY TEST: CPT | Mod: 26

## 2022-06-17 PROCEDURE — 93306 TTE W/DOPPLER COMPLETE: CPT | Mod: 26

## 2022-06-17 PROCEDURE — 93010 ELECTROCARDIOGRAM REPORT: CPT

## 2022-06-17 PROCEDURE — ZZZZZ: CPT

## 2022-06-17 PROCEDURE — 99222 1ST HOSP IP/OBS MODERATE 55: CPT

## 2022-06-17 RX ORDER — INSULIN LISPRO 100/ML
VIAL (ML) SUBCUTANEOUS AT BEDTIME
Refills: 0 | Status: DISCONTINUED | OUTPATIENT
Start: 2022-06-17 | End: 2022-06-23

## 2022-06-17 RX ORDER — FUROSEMIDE 40 MG
80 TABLET ORAL DAILY
Refills: 0 | Status: DISCONTINUED | OUTPATIENT
Start: 2022-06-17 | End: 2022-06-23

## 2022-06-17 RX ORDER — CARVEDILOL PHOSPHATE 80 MG/1
6.25 CAPSULE, EXTENDED RELEASE ORAL EVERY 12 HOURS
Refills: 0 | Status: DISCONTINUED | OUTPATIENT
Start: 2022-06-17 | End: 2022-06-23

## 2022-06-17 RX ORDER — POTASSIUM CHLORIDE 20 MEQ
20 PACKET (EA) ORAL
Refills: 0 | Status: DISCONTINUED | OUTPATIENT
Start: 2022-06-17 | End: 2022-06-21

## 2022-06-17 RX ORDER — INSULIN LISPRO 100/ML
VIAL (ML) SUBCUTANEOUS
Refills: 0 | Status: DISCONTINUED | OUTPATIENT
Start: 2022-06-17 | End: 2022-06-23

## 2022-06-17 RX ORDER — ISOSORBIDE MONONITRATE 60 MG/1
30 TABLET, EXTENDED RELEASE ORAL DAILY
Refills: 0 | Status: DISCONTINUED | OUTPATIENT
Start: 2022-06-17 | End: 2022-06-23

## 2022-06-17 RX ORDER — FERROUS SULFATE 325(65) MG
325 TABLET ORAL DAILY
Refills: 0 | Status: DISCONTINUED | OUTPATIENT
Start: 2022-06-17 | End: 2022-06-23

## 2022-06-17 RX ORDER — PANTOPRAZOLE SODIUM 20 MG/1
40 TABLET, DELAYED RELEASE ORAL
Refills: 0 | Status: DISCONTINUED | OUTPATIENT
Start: 2022-06-17 | End: 2022-06-23

## 2022-06-17 RX ORDER — ASPIRIN/CALCIUM CARB/MAGNESIUM 324 MG
81 TABLET ORAL DAILY
Refills: 0 | Status: DISCONTINUED | OUTPATIENT
Start: 2022-06-17 | End: 2022-06-23

## 2022-06-17 RX ORDER — TAMSULOSIN HYDROCHLORIDE 0.4 MG/1
0.4 CAPSULE ORAL AT BEDTIME
Refills: 0 | Status: DISCONTINUED | OUTPATIENT
Start: 2022-06-17 | End: 2022-06-23

## 2022-06-17 RX ORDER — ATORVASTATIN CALCIUM 80 MG/1
80 TABLET, FILM COATED ORAL AT BEDTIME
Refills: 0 | Status: DISCONTINUED | OUTPATIENT
Start: 2022-06-17 | End: 2022-06-23

## 2022-06-17 RX ORDER — ENOXAPARIN SODIUM 100 MG/ML
40 INJECTION SUBCUTANEOUS EVERY 24 HOURS
Refills: 0 | Status: DISCONTINUED | OUTPATIENT
Start: 2022-06-17 | End: 2022-06-22

## 2022-06-17 RX ORDER — AMLODIPINE BESYLATE 2.5 MG/1
5 TABLET ORAL DAILY
Refills: 0 | Status: DISCONTINUED | OUTPATIENT
Start: 2022-06-17 | End: 2022-06-23

## 2022-06-17 RX ORDER — HYDRALAZINE HCL 50 MG
5 TABLET ORAL ONCE
Refills: 0 | Status: COMPLETED | OUTPATIENT
Start: 2022-06-17 | End: 2022-06-17

## 2022-06-17 RX ORDER — SPIRONOLACTONE 25 MG/1
25 TABLET, FILM COATED ORAL DAILY
Refills: 0 | Status: DISCONTINUED | OUTPATIENT
Start: 2022-06-17 | End: 2022-06-21

## 2022-06-17 RX ORDER — SODIUM CHLORIDE 9 MG/ML
3 INJECTION INTRAMUSCULAR; INTRAVENOUS; SUBCUTANEOUS EVERY 8 HOURS
Refills: 0 | Status: DISCONTINUED | OUTPATIENT
Start: 2022-06-17 | End: 2022-06-23

## 2022-06-17 RX ADMIN — PANTOPRAZOLE SODIUM 40 MILLIGRAM(S): 20 TABLET, DELAYED RELEASE ORAL at 06:03

## 2022-06-17 RX ADMIN — Medication 81 MILLIGRAM(S): at 11:44

## 2022-06-17 RX ADMIN — Medication 5 MILLIGRAM(S): at 00:54

## 2022-06-17 RX ADMIN — TAMSULOSIN HYDROCHLORIDE 0.4 MILLIGRAM(S): 0.4 CAPSULE ORAL at 22:23

## 2022-06-17 RX ADMIN — Medication 325 MILLIGRAM(S): at 11:44

## 2022-06-17 RX ADMIN — Medication 20 MILLIEQUIVALENT(S): at 17:01

## 2022-06-17 RX ADMIN — CARVEDILOL PHOSPHATE 6.25 MILLIGRAM(S): 80 CAPSULE, EXTENDED RELEASE ORAL at 17:01

## 2022-06-17 RX ADMIN — SODIUM CHLORIDE 3 MILLILITER(S): 9 INJECTION INTRAMUSCULAR; INTRAVENOUS; SUBCUTANEOUS at 15:44

## 2022-06-17 RX ADMIN — SODIUM CHLORIDE 3 MILLILITER(S): 9 INJECTION INTRAMUSCULAR; INTRAVENOUS; SUBCUTANEOUS at 05:54

## 2022-06-17 RX ADMIN — Medication 80 MILLIGRAM(S): at 06:26

## 2022-06-17 RX ADMIN — ISOSORBIDE MONONITRATE 30 MILLIGRAM(S): 60 TABLET, EXTENDED RELEASE ORAL at 11:44

## 2022-06-17 RX ADMIN — SPIRONOLACTONE 25 MILLIGRAM(S): 25 TABLET, FILM COATED ORAL at 06:01

## 2022-06-17 RX ADMIN — SODIUM CHLORIDE 3 MILLILITER(S): 9 INJECTION INTRAMUSCULAR; INTRAVENOUS; SUBCUTANEOUS at 22:51

## 2022-06-17 RX ADMIN — ENOXAPARIN SODIUM 40 MILLIGRAM(S): 100 INJECTION SUBCUTANEOUS at 12:35

## 2022-06-17 RX ADMIN — CARVEDILOL PHOSPHATE 6.25 MILLIGRAM(S): 80 CAPSULE, EXTENDED RELEASE ORAL at 06:02

## 2022-06-17 RX ADMIN — ATORVASTATIN CALCIUM 80 MILLIGRAM(S): 80 TABLET, FILM COATED ORAL at 22:22

## 2022-06-17 RX ADMIN — AMLODIPINE BESYLATE 5 MILLIGRAM(S): 2.5 TABLET ORAL at 06:02

## 2022-06-17 RX ADMIN — Medication 20 MILLIEQUIVALENT(S): at 06:16

## 2022-06-17 NOTE — H&P ADULT - PROBLEM SELECTOR PLAN 3
c/w diuresis with Lasix 80mg QD and Aldactone 25mg  Strict I&Os  TTE on 6/16 with evidence of severe MR

## 2022-06-17 NOTE — H&P ADULT - NSHPSOCIALHISTORY_GEN_ALL_CORE
Marital Status:    Occupation: retired doorman/security  Lives with: wife    Substance Use : denies  Tobacco Usage:  ( x  ) never smoked   (   ) former smoker   (   ) current smoker  (     ) pack year  (        ) last tobacco use date  Alcohol Usage: denies

## 2022-06-17 NOTE — PATIENT PROFILE ADULT - DEAF OR HARD OF HEARING?
CRITICAL CARE PROGRESS NOTE      Patient:  Korina Gill    Unit/Bed:4D-11/011-A  YOB: 1953  MRN: [id number]   PCP: DIONTE Andrade CNP  Date of Admission: 1/14/2022  Chief Complaint:-Buttock pain    Assessment and Plan:      1. Necrotizing fasciitis: Noted on CT scan on 1/14. Patient was initiated on broad-spectrum antibiotics. Underwent emergent I&D with Dr. Marilou Herzog. Continue antibiotics. Zosyn day #3, clindamycin day #3. 2. Hypernatremia: Today sodium improved to 149. Urine sodium is 25 and urine osmolality is 191 both of which is elevated. Serum osmolality is 343. We will increase D5W to 100ml/hr and restrict sodium intake to 2gm Na. Continue to trend BMP. 3. Leukocytosis: Improved. WBC today 12.2, continue antibiotic therapy. Afebrile. 4. Normocytic anemia: Patient had noted blood loss postoperatively from wounds. Patient has received 2 units of PRBC and 2 units of FFP since arrival. No active bleeding at this time. Today H&H is 7.8 and 24.9. Will continue to trend and watch for bleeding. 5. Elevated INR: Status post 2 units FFP and 2 units PRBC. Patient was on Eliquis for DVT but is held at this time due anemia. 6. Acute DVT: Was initiated on Eliquis on 1/8/2022 after being diagnosed with anterior tibial and peroneal veins of the left lower extremity. Eliquis was stopped at the time of arrival due to bleeding. Considering IVC filter. Consultation to interventional radiologist.  Carmen Hides to hear back. Per guidelines if patient is inpatient or as prolonged immobility IVC filter should be considered. Return phone call from Dr. Jn Casas stating no need for emergent IVC filter. Recommends restarting Eliquis when patient's hemoglobin is stable. 7. Paroxysmal atrial fib: Question of patient having atrial fibrillation with RVR during this admission. There is no EKG representing this rhythm. Patient is currently in sinus rhythm     8.  Hypertension: Holding Norvasc, hydralazine, hydrochlorothiazide, Cozaar, Toprol in the setting of hypotension and shock. Resume as appropriate. 9. Hyperlipidemia: per history with last cholesterol level 11/13 231. Patient will be encouraged to follow up with PCP outpatient at time of discharge. 10. Diabetes mellitus: Sliding scale insulin        Will attempt to wean D5W when possible and recheck glucose every 4 hrs. 11. Non-anion gap metabolic acidosis: Resolved. Continue to monitor. 12. History of urinary retention: Flomax    13. History of COVID-19: Noted hospitalization for COVID-19 requiring no oxygenation support. 14. Obesity: BMI 30.81. INITIAL H AND P AND ICU COURSE:  Johan Calvo is a 53-year-old white male who presented to [facility] on 1/14/2022 with complaints of buttocks pain. He has a past medical history of lifetime non-smoker, recent left lower extremity DVT on Eliquis, diabetes mellitus, hypertension, urinary retention. Per report he presented to the emergency department via EMS from [facility] with new decubitus ulcer. Patient believes it was present for multiple weeks however was not well documented prior to hospital encounter. He reports that it has been draining for approximately 1 week. Patient reported he was having worsening pain fever and sweats and chills over the last week. In the emergency department he was seen on 1/8/2022 ultrasound of leg demonstrated acute DVT he was started on Eliquis. Of note he was also hospitalized from 12/7/2021 to 12/20/2021 for COVID-19 pneumonia. He returned to his nursing facility on room air. In the emergency department he was positive for lactic acidosis. Imaging revealed a decubitus ulcer with air in the perineum. Patient was fluid resuscitated per sepsis protocol and given Vanco and Zosyn. General surgery was consulted for debridement and concern for necrotizing fasciitis.   Initially patient was admitted to stepdown postprocedure he was transferred to the ICU for hypotension. He did undergo emergent debridement of abscess on 1/14 with Dr. Adina Lange. He was initially on vasopressors which have been weaned off.    1/16/22 remains hypernatremic 152. Will increase D5W to 100ml/hr. Also, changed diet to Na 2 gm. 1/17/22 hypernatremia has improved to 149. Currently on [email or web address]. Past Medical History: per HPI   Family History: Mother: Alzheimer's Father: Heart disease  Social History: Lifetime non-smoker, denies alcohol and drug use. Review of Systems   Constitutional: Positive for fatigue. Negative for chills and fever. HENT: Negative for sore throat and trouble swallowing. Eyes: Negative for photophobia and visual disturbance. Respiratory: Negative for chest tightness, shortness of breath and wheezing. Cardiovascular: Negative for chest pain and leg swelling. Gastrointestinal: Negative for abdominal pain, diarrhea and vomiting. Genitourinary: Negative for decreased urine volume, flank pain and hematuria. Musculoskeletal: Negative for back pain and neck pain. Skin: Positive for wound. Negative for color change and pallor. Allergic/Immunologic: Negative for food allergies. Neurological: Positive for weakness. Negative for dizziness and headaches. Hematological: Negative for adenopathy. Psychiatric/Behavioral: Negative for agitation and confusion. The patient is not nervous/anxious.         Scheduled Meds:   famotidine  20 mg Oral BID    sodium hypochlorite   Irrigation Daily    [Held by provider] amLODIPine  10 mg Oral Daily    [Held by provider] apixaban  10 mg Oral BID    [Held by provider] aspirin  81 mg Oral Daily    [Held by provider] hydrALAZINE  100 mg Oral Q8H    [Held by provider] hydroCHLOROthiazide  50 mg Oral Daily    [Held by provider] losartan  100 mg Oral Daily    [Held by provider] metoprolol succinate  50 mg Oral Daily    sodium chloride flush  5-40 mL IntraVENous 2 times per day    insulin lispro  0-18 Units SubCUTAneous Q4H    calcium replacement protocol   Other RX Placeholder    piperacillin-tazobactam  3,375 mg IntraVENous Q8H    tamsulosin  0.4 mg Oral Daily    clindamycin (CLEOCIN) IV  600 mg IntraVENous Q8H     Continuous Infusions:   sodium chloride      dextrose 100 mL/hr at 01/18/22 5651    sodium chloride      dextrose      sodium chloride      sodium chloride         PHYSICAL EXAMINATION:  T:  98.3. P:  57. RR:  15. B/P:  144/61. F O2 Sat:  96 on RA  I/O:  2630.7/2125 Total 505.7  Body mass index is 30.81 kg/m². GCS:  15  General:   Acute on chronically ill-appearing white male  HEENT:  normocephalic and atraumatic. No scleral icterus. PERR  Neck: supple. No Thyromegaly. Lungs: clear to auscultation. No retractions  Cardiac: RRR. No JVD. Abdomen: soft. Nontender. Extremities:  No clubbing, cyanosis, or edema x 4. Vasculature: sluggish capillary refill > 3 seconds. Palpable dorsalis pedis pulses. Skin:  warm and dry. Wound   Psych:  Alert and oriented x3. Affect appropriate  Lymph:  No supraclavicular adenopathy. Neurologic:  No focal deficit. No seizures. Data: (All radiographs, tracings, PFTs, and imaging are personally viewed and interpreted unless otherwise noted).  Sodium 149, potassium 3.2, chloride 122, CO2 22, BUN 28, creatinine 0.7, anion gap 7.0, glucose 119.  WBC 12.2, hemoglobin 7.8, hematocrit 24.9, platelet count 313   Telemetry shows normal sinus rhythm   CT abdomen pelvis 1/14/2022 reports decubitus ulcer overlying the sacrum. Air in the perineum.  Chest x-ray 1/15/2022 reports bilateral patchy interstitial opacities   Left lower extremity venous Doppler 1/8/2022 reports acute thrombus in the peroneal and anterior tibial vein.    EKG 12/19/2021 sinus bradycardia   Echocardiogram 12/13/2021 reports ejection fraction of 60% with normal LV function    Meets Continued ICU Level Care Criteria:    [] Yes   [x] No - Transfer Planned to listed location: Awaiting bed location  [] HOSPITALIST CONTACTED- DR Thomson and plan discussed with Dr. Adrianne Ozuna     Electronically signed by Adrian Wan MD  CRITICAL CARE SPECIALIST        Lab Results   Component Value Date    PH 7.45 01/15/2022    PCO2 30 01/15/2022    PO2 55 01/15/2022    HCO3 21 01/15/2022    O2SAT 90 01/15/2022     Lab Results   Component Value Date    IFIO2 4 01/15/2022     CBC:   Recent Labs     01/16/22  0910 01/16/22  2340 01/17/22  0450   WBC 14.4*  --  12.2*   HGB 8.0* 7.5* 7.8*   HCT 25.3* 23.9* 24.9*     --  221     BMP:  Recent Labs     01/16/22  0910 01/16/22  0910 01/17/22  0450 01/18/22  0525 01/18/22  0745   *  --  152* 149*  --    K 3.7   < > 3.7 2.7* 3.2*   *  --  121* 122*  --    CO2 22*  --  23 20*  --    BUN 57*  --  47* 28*  --    CREATININE 1.2  --  1.2 0.7  --    GLUCOSE 109*  --  119* 133*  --    MG  --   --  2.6*  --   --    PHOS  --   --  2.9  --   --    CALCIUM 7.4*  --  7.6* 6.5*  --     < > = values in this interval not displayed. Hepatic:   Recent Labs     01/15/22  1830 01/17/22  0450 01/18/22  0525   AST 21 25 17   ALT 49 44 33   BILITOT 0.5 0.4 0.3   ALKPHOS 101 100 77     Cardiac Enzymes: No results for input(s): CKTOTAL, CKMB, TROPONINI in the last 72 hours. BNP: No results for input(s): BNP in the last 72 hours. INR:   Recent Labs     01/15/22  2200 01/16/22  0910   INR 2.92* 2.37*     POC   Recent Labs     01/18/22  0009 01/18/22  0533 01/18/22  0818   POCGLU 121* 272* 78     No results for input(s): LACTA in the last 72 hours. Venous duplex scan of the left lower extremity: Performed on 1/8/2022  Sat Jan 8, 2022  3:22   FINDINGS: There is lack of compressibility and absent flow due to acute thrombus in the peroneal and anterior tibial veins. Remaining vein segments demonstrate normal compressibility and color flow. Acute thrombus in the peroneal and anterior tibial veins.      CT angiogram of chest performed on 12/7/2021:  PROCEDURE: CTA CHEST W WO CONTRAST, CTA ABDOMEN PELVIS W WO CONTRAST   CLINICAL INFORMATION: pulmonary embolism. 1. No pulmonary embolism. No aneurysm of the thoracic or abdominal aorta. No dissection. 2. Extensive bilateral infiltrates. Patient's IV fluids were changed to D5 water at 50 mL/h due to worsening of hyponatremia from 0.45 NS. Patient's INR is improving.        Meets Continued ICU Level Care Criteria:    [] Yes   [x] No - Transfer Planned to listed location: Awaiting bed location  [] HOSPITALIST CONTACTED-      Case and plan discussed with Dr. Maame Souza     Electronically signed by MD Jn Novak      Electronically signed by Pop Waterman MD on 1/18/2022 at 8:40 AM no

## 2022-06-17 NOTE — H&P ADULT - NSICDXPASTMEDICALHX_GEN_ALL_CORE_FT
PAST MEDICAL HISTORY:  CAD (coronary artery disease)     CHF NYHA class III     DM (diabetes mellitus)     HLD (hyperlipidemia)     HTN (hypertension)     KIMMY (iron deficiency anemia)     PAF (paroxysmal atrial fibrillation)

## 2022-06-17 NOTE — H&P ADULT - ASSESSMENT
82 yo male with cad s/p stent x2 on plavix, ischemic cardiomyopathy w/ mild LV dysfunction, HLD, CHF, KIMMY, paroxysmal afib (not on ac due to bleed in the past?), HTN, DM2 presents to the ER with acute R sided chest pressure starting around 11AM today. Admitted for further evaluation due to elevated cardiac enzymes.  Pt s/p cardiac catherization with evidence of CAD with left main disease. PT transferred to [facility] for surgical evaluation with Dr. Perez. Labs and imaging to be reviewed with Dr. Perez.      6/17: transferred to [facility], preop w/u in progress, f/u P2Y12 and preop labs

## 2022-06-17 NOTE — PROGRESS NOTE ADULT - SUBJECTIVE AND OBJECTIVE BOX
VITAL SIGNS    Telemetry:    Vital Signs Last 24 Hrs  T(C): 37.3 (22 @ 04:50), Max: 37.3 (22 @ 04:50)  T(F): 99.1 (22 @ 04:50), Max: 99.1 (22 @ 04:50)  HR: 67 (22 @ 04:50) (62 - 73)  BP: 123/70 (22 @ 04:50) (123/70 - 164/92)  RR: 18 (22 @ 04:50) (14 - 18)  SpO2: 98% (22 @ 04:50) (98% - 100%)             @ 07:01  -   @ 07:00  --------------------------------------------------------  IN: 100 mL / OUT: 500 mL / NET: -400 mL       Daily Height in cm: 170.18 (2022 00:03)    Daily Weight in k.8 (2022 00:03)  Admit Wt: Drug Dosing Weight  Height (cm): 170.2 (2022 00:03)  Weight (kg): 68.8 (2022 00:03)  BMI (kg/m2): 23.8 (2022 00:03)  BSA (m2): 1.8 (2022 00:03)    Bilirubin Total, Serum: 1.0 mg/dL ( @ 07:41)    CAPILLARY BLOOD GLUCOSE      POCT Blood Glucose.: 135 mg/dL (2022 07:28)  POCT Blood Glucose.: 163 mg/dL (2022 22:18)  POCT Blood Glucose.: 99 mg/dL (2022 19:36)  POCT Blood Glucose.: 87 mg/dL (2022 17:44)  POCT Blood Glucose.: 103 mg/dL (2022 13:06)          amLODIPine   Tablet 5 milliGRAM(s) Oral daily  aspirin enteric coated 81 milliGRAM(s) Oral daily  atorvastatin 80 milliGRAM(s) Oral at bedtime  carvedilol 6.25 milliGRAM(s) Oral every 12 hours  ferrous    sulfate 325 milliGRAM(s) Oral daily  furosemide    Tablet 80 milliGRAM(s) Oral daily  insulin lispro (ADMELOG) corrective regimen sliding scale   SubCutaneous three times a day before meals  insulin lispro (ADMELOG) corrective regimen sliding scale   SubCutaneous at bedtime  isosorbide   mononitrate ER Tablet (IMDUR) 30 milliGRAM(s) Oral daily  pantoprazole    Tablet 40 milliGRAM(s) Oral before breakfast  potassium chloride    Tablet ER 20 milliEquivalent(s) Oral two times a day  sodium chloride 0.9% lock flush 3 milliLiter(s) IV Push every 8 hours  spironolactone 25 milliGRAM(s) Oral daily  tamsulosin 0.4 milliGRAM(s) Oral at bedtime      PHYSICAL EXAM    Subjective: "Hi.   Neurology: alert and oriented x 3, nonfocal, no gross deficits  CV : tele:  RSR  Sternal Wound :  CDI with dressing , Stable  Lungs: clear. RR easy, unlabored   Abdomen: soft, nontender, nondistended, positive bowel sounds, bowel movement   Neg N/V/D   :  pt voiding without difficulty   Extremities:   SHELBY; edema, neg calf tenderness.   PPP bilaterally      PW:  Chest tubes:                 VITAL SIGNS    Telemetry:  acc junct/ rsr 50-60   Vital Signs Last 24 Hrs  T(C): 37.3 (22 @ 04:50), Max: 37.3 (22 @ 04:50)  T(F): 99.1 (22 @ 04:50), Max: 99.1 (22 @ 04:50)  HR: 67 (22 @ 04:50) (62 - 73)  BP: 123/70 (22 @ 04:50) (123/70 - 164/92)  RR: 18 (22 @ 04:50) (14 - 18)  SpO2: 98% (22 @ 04:50) (98% - 100%)             @ 07:01  -   @ 07:00  --------------------------------------------------------  IN: 100 mL / OUT: 500 mL / NET: -400 mL       Daily Height in cm: 170.18 (2022 00:03)    Daily Weight in k.8 (2022 00:03)  Admit Wt: Drug Dosing Weight  Height (cm): 170.2 (2022 00:03)  Weight (kg): 68.8 (2022 00:03)  BMI (kg/m2): 23.8 (2022 00:03)  BSA (m2): 1.8 (2022 00:03)    Bilirubin Total, Serum: 1.0 mg/dL ( @ 07:41)    CAPILLARY BLOOD GLUCOSE      POCT Blood Glucose.: 135 mg/dL (2022 07:28)  POCT Blood Glucose.: 163 mg/dL (2022 22:18)  POCT Blood Glucose.: 99 mg/dL (2022 19:36)  POCT Blood Glucose.: 87 mg/dL (2022 17:44)  POCT Blood Glucose.: 103 mg/dL (2022 13:06)          amLODIPine   Tablet 5 milliGRAM(s) Oral daily  aspirin enteric coated 81 milliGRAM(s) Oral daily  atorvastatin 80 milliGRAM(s) Oral at bedtime  carvedilol 6.25 milliGRAM(s) Oral every 12 hours  ferrous    sulfate 325 milliGRAM(s) Oral daily  furosemide    Tablet 80 milliGRAM(s) Oral daily  insulin lispro (ADMELOG) corrective regimen sliding scale   SubCutaneous three times a day before meals  insulin lispro (ADMELOG) corrective regimen sliding scale   SubCutaneous at bedtime  isosorbide   mononitrate ER Tablet (IMDUR) 30 milliGRAM(s) Oral daily  pantoprazole    Tablet 40 milliGRAM(s) Oral before breakfast  potassium chloride    Tablet ER 20 milliEquivalent(s) Oral two times a day  sodium chloride 0.9% lock flush 3 milliLiter(s) IV Push every 8 hours  spironolactone 25 milliGRAM(s) Oral daily  tamsulosin 0.4 milliGRAM(s) Oral at bedtime      PHYSICAL EXAM    Subjective: "I feel ok."   Neurology: alert and oriented x 3, nonfocal, no gross deficits  CV : tele: acc junct/ rsr 50-60    Lungs: clear. RR easy, unlabored   Abdomen: soft, nontender, nondistended, positive bowel sounds, + bowel movement   Neg N/V/D   :  pt voiding without difficulty   Extremities:   SHELBY; neg Le edema, neg calf tenderness.   PPP bilaterally; rt groin cath site cdi melanie- neg bleeding/ hematoma

## 2022-06-17 NOTE — H&P ADULT - PROBLEM SELECTOR PLAN 1
Cath Report Pre-Ordonez: left main disease  c/w ASA 81, Atorvastatin 80, Coreg 6.25 BID  Preop w/u in progress- PFTs, TFTs, preop labs  f/u P2Y12  c/w Imdur 30mg QD  Tentative OR Date: TBD  Labs and imaging reviewed with Dr. Perez

## 2022-06-17 NOTE — CONSULT NOTE ADULT - SUBJECTIVE AND OBJECTIVE BOX
Date of Service :   06-17-22 @ 15:55  CHIEF COMPLAINT:Patient is a 81y old  Male who presents with a chief complaint of CAD, left main disease (17 Jun 2022 10:44)      HISTORY OF PRESENT ILLNESS:HPI:  80 yo male with cad s/p stent x2 on plavix, ischemic cardiomyopathy w/ mild LV dysfunction, HLD, CHF, KIMMY, paroxysmal afib (not on ac due to bleed in the past?), HTN, DM2 presents to the ER with acute R sided chest pressure starting around 11AM today. Admitted for further evaluation due to elevated cardiac enzymes.    Pt s/p cardiac catherization with evidence of CAD with left main disease. PT transferred to [address] for surgical evaluation with Dr. Perez. Labs and imaging to be reviewed with Dr. Perez.   (17 Jun 2022 01:25)      PAST MEDICAL & SURGICAL HISTORY:  CAD (coronary artery disease)      CHF (congestive heart failure)      HTN (hypertension)      HLD (hyperlipidemia)      DM (diabetes mellitus)      KIMMY (iron deficiency anemia)      PAF (paroxysmal atrial fibrillation)      Chronic diastolic congestive heart failure, NYHA class 3      CAD (coronary artery disease)      CHF NYHA class III      DM (diabetes mellitus)      HLD (hyperlipidemia)      HTN (hypertension)      KIMMY (iron deficiency anemia)      PAF (paroxysmal atrial fibrillation)      S/P hernia repair  Lt      S/P coronary artery stent placement  2x in 4/17 in [place]      S/P coronary artery stent placement  x2 4/17 in [place]      H/O hernia repair              MEDICATIONS:  amLODIPine   Tablet 5 milliGRAM(s) Oral daily  aspirin enteric coated 81 milliGRAM(s) Oral daily  carvedilol 6.25 milliGRAM(s) Oral every 12 hours  enoxaparin Injectable 40 milliGRAM(s) SubCutaneous every 24 hours  furosemide    Tablet 80 milliGRAM(s) Oral daily  isosorbide   mononitrate ER Tablet (IMDUR) 30 milliGRAM(s) Oral daily  spironolactone 25 milliGRAM(s) Oral daily  tamsulosin 0.4 milliGRAM(s) Oral at bedtime          pantoprazole    Tablet 40 milliGRAM(s) Oral before breakfast    atorvastatin 80 milliGRAM(s) Oral at bedtime  insulin lispro (ADMELOG) corrective regimen sliding scale   SubCutaneous three times a day before meals  insulin lispro (ADMELOG) corrective regimen sliding scale   SubCutaneous at bedtime    ferrous    sulfate 325 milliGRAM(s) Oral daily  potassium chloride    Tablet ER 20 milliEquivalent(s) Oral two times a day  sodium chloride 0.9% lock flush 3 milliLiter(s) IV Push every 8 hours      FAMILY HISTORY:  No pertinent family history in first degree relatives        Non-contributory    SOCIAL HISTORY:    [ ] Tobacco  [ ] Drugs  [ ] Alcohol    Allergies    No Known Allergies    Intolerances    	    REVIEW OF SYSTEMS:  CONSTITUTIONAL: No fever  EYES: No eye pain, visual disturbances, or discharge  ENMT:  No difficulty hearing, tinnitus  NECK: No pain or stiffness  RESPIRATORY: No cough, wheezing,  CARDIOVASCULAR: No chest pain, palpitations, passing out, dizziness, or leg swelling  GASTROINTESTINAL:  No nausea, vomiting, diarrhea or constipation. No melena.  GENITOURINARY: No dysuria, hematuria  NEUROLOGICAL: No stroke like symptoms  SKIN: No burning or lesions   ENDOCRINE: No heat or cold intolerance  MUSCULOSKELETAL: No joint pain or swelling  PSYCHIATRIC: No  anxiety, mood swings  HEME/LYMPH: No bleeding gums  ALLERGY AND IMMUNOLOGIC: No hives or eczema	    All other ROS negative    PHYSICAL EXAM:  T(C): 36.7 (06-17-22 @ 11:52), Max: 37.3 (06-17-22 @ 04:50)  HR: 69 (06-17-22 @ 11:52) (67 - 73)  BP: 134/79 (06-17-22 @ 11:52) (123/70 - 164/92)  RR: 18 (06-17-22 @ 11:52) (16 - 18)  SpO2: 98% (06-17-22 @ 11:52) (98% - 100%)  Wt(kg): --  I&O's Summary    16 Jun 2022 07:01  -  17 Jun 2022 07:00  --------------------------------------------------------  IN: 100 mL / OUT: 500 mL / NET: -400 mL    17 Jun 2022 07:01  -  17 Jun 2022 15:55  --------------------------------------------------------  IN: 720 mL / OUT: 850 mL / NET: -130 mL        Appearance: Normal	  HEENT:   Normal oral mucosa, EOMI	  Cardiovascular:  S1 S2, No JVD,    Respiratory: Lungs clear to auscultation	  Psychiatry: Alert  Gastrointestinal:  Soft, Non-tender, + BS	  Skin: No rashes   Neurologic: Non-focal  Extremities:  No edema  Vascular: Peripheral pulses palpable    	    	  	  CARDIAC MARKERS:  Labs personally reviewed by me                                  13.5   8.40  )-----------( 210      ( 17 Jun 2022 07:41 )             42.1     06-17    138  |  102  |  12  ----------------------------<  122<H>  4.2   |  23  |  0.81    Ca    10.1      17 Jun 2022 07:41  Phos  3.6     06-16  Mg     1.90     06-16    TPro  7.7  /  Alb  4.1  /  TBili  1.0  /  DBili  x   /  AST  37  /  ALT  38  /  AlkPhos  101  06-17          EKG: Personally reviewed by me -   Radiology: Personally reviewed by me -       Assessment /Plan:   80 yo male with cad s/p stent x2 on plavix, ischemic cardiomyopathy w/ mild LV dysfunction, HLD, CHF, KIMMY, paroxysmal afib (not on ac due to bleed in the past?), HTN, DM2 presents to the ER with acute R sided chest pressure starting around 11AM today. Admitted for further evaluation due to elevated cardiac enzymes.    Pt s/p cardiac catherization with evidence of CAD with left main disease. PT transferred to [facility] for surgical evaluation with Dr. Perez. Labs and imaging to be reviewed with Dr. Perez.     Problem/Recommendation - 1:  ·  Problem      Imani Condon [facility]   Prasanna Scott DO [facility]  Cardiovascular Medicine  [address], [address]  Office [phone]   Date of Service :   06-17-22 @ 15:55  CHIEF COMPLAINT:Patient is a 81y old  Male who presents with a chief complaint of CAD, left main disease (17 Jun 2022 10:44)      HISTORY OF PRESENT ILLNESS:HPI:  80 yo male with cad s/p stent x2 on plavix, ischemic cardiomyopathy w/ mild LV dysfunction, HLD, CHF, KIMMY, paroxysmal afib (not on ac due to bleed in the past?), HTN, DM2 presents to the ER with acute R sided chest pressure starting around 11AM today. Admitted for further evaluation due to elevated cardiac enzymes.    Pt s/p cardiac catherization with evidence of CAD with left main disease. PT transferred to [address] for surgical evaluation with Dr. Perez. Labs and imaging to be reviewed with Dr. Perez.   (17 Jun 2022 01:25)      PAST MEDICAL & SURGICAL HISTORY:  CAD (coronary artery disease)      CHF (congestive heart failure)      HTN (hypertension)      HLD (hyperlipidemia)      DM (diabetes mellitus)      KIMMY (iron deficiency anemia)      PAF (paroxysmal atrial fibrillation)      Chronic diastolic congestive heart failure, NYHA class 3      CAD (coronary artery disease)      CHF NYHA class III      DM (diabetes mellitus)      HLD (hyperlipidemia)      HTN (hypertension)      KIMMY (iron deficiency anemia)      PAF (paroxysmal atrial fibrillation)      S/P hernia repair  Lt      S/P coronary artery stent placement  2x in 4/17 in [place]      S/P coronary artery stent placement  x2 4/17 in [place]      H/O hernia repair              MEDICATIONS:  amLODIPine   Tablet 5 milliGRAM(s) Oral daily  aspirin enteric coated 81 milliGRAM(s) Oral daily  carvedilol 6.25 milliGRAM(s) Oral every 12 hours  enoxaparin Injectable 40 milliGRAM(s) SubCutaneous every 24 hours  furosemide    Tablet 80 milliGRAM(s) Oral daily  isosorbide   mononitrate ER Tablet (IMDUR) 30 milliGRAM(s) Oral daily  spironolactone 25 milliGRAM(s) Oral daily  tamsulosin 0.4 milliGRAM(s) Oral at bedtime          pantoprazole    Tablet 40 milliGRAM(s) Oral before breakfast    atorvastatin 80 milliGRAM(s) Oral at bedtime  insulin lispro (ADMELOG) corrective regimen sliding scale   SubCutaneous three times a day before meals  insulin lispro (ADMELOG) corrective regimen sliding scale   SubCutaneous at bedtime    ferrous    sulfate 325 milliGRAM(s) Oral daily  potassium chloride    Tablet ER 20 milliEquivalent(s) Oral two times a day  sodium chloride 0.9% lock flush 3 milliLiter(s) IV Push every 8 hours      FAMILY HISTORY:  No pertinent family history in first degree relatives        Non-contributory    SOCIAL HISTORY:    [ ] Tobacco  [ ] Drugs  [ ] Alcohol    Allergies    No Known Allergies    Intolerances    	    REVIEW OF SYSTEMS:  CONSTITUTIONAL: No fever  EYES: No eye pain, visual disturbances, or discharge  ENMT:  No difficulty hearing, tinnitus  NECK: No pain or stiffness  RESPIRATORY: No cough, wheezing,  CARDIOVASCULAR: No chest pain, palpitations, passing out, dizziness, or leg swelling  GASTROINTESTINAL:  No nausea, vomiting, diarrhea or constipation. No melena.  GENITOURINARY: No dysuria, hematuria  NEUROLOGICAL: No stroke like symptoms  SKIN: No burning or lesions   ENDOCRINE: No heat or cold intolerance  MUSCULOSKELETAL: No joint pain or swelling  PSYCHIATRIC: No  anxiety, mood swings  HEME/LYMPH: No bleeding gums  ALLERGY AND IMMUNOLOGIC: No hives or eczema	    All other ROS negative    PHYSICAL EXAM:  T(C): 36.7 (06-17-22 @ 11:52), Max: 37.3 (06-17-22 @ 04:50)  HR: 69 (06-17-22 @ 11:52) (67 - 73)  BP: 134/79 (06-17-22 @ 11:52) (123/70 - 164/92)  RR: 18 (06-17-22 @ 11:52) (16 - 18)  SpO2: 98% (06-17-22 @ 11:52) (98% - 100%)  Wt(kg): --  I&O's Summary    16 Jun 2022 07:01  -  17 Jun 2022 07:00  --------------------------------------------------------  IN: 100 mL / OUT: 500 mL / NET: -400 mL    17 Jun 2022 07:01  -  17 Jun 2022 15:55  --------------------------------------------------------  IN: 720 mL / OUT: 850 mL / NET: -130 mL        Appearance: Normal	  HEENT:   Normal oral mucosa, EOMI	  Cardiovascular:  S1 S2, No JVD,    Respiratory: Lungs clear to auscultation	  Psychiatry: Alert  Gastrointestinal:  Soft, Non-tender, + BS	  Skin: No rashes   Neurologic: Non-focal  Extremities:  No edema  Vascular: Peripheral pulses palpable    	    	  	  CARDIAC MARKERS:  Labs personally reviewed by me                                  13.5   8.40  )-----------( 210      ( 17 Jun 2022 07:41 )             42.1     06-17    138  |  102  |  12  ----------------------------<  122<H>  4.2   |  23  |  0.81    Ca    10.1      17 Jun 2022 07:41  Phos  3.6     06-16  Mg     1.90     06-16    TPro  7.7  /  Alb  4.1  /  TBili  1.0  /  DBili  x   /  AST  37  /  ALT  38  /  AlkPhos  101  06-17          EKG: Personally reviewed by me - SB 53bpm PVC   Radiology: Personally reviewed by me -   < from: Xray Chest 2 Views PA/Lat (06.15.22 @ 17:00) >  IMPRESSION:  Clear lungs.      < end of copied text >  < from: TTE with Doppler (w/Cont) (06.16.22 @ 11:28) >  Ejection Fraction (Visual Estimate): 45 %  ------------------------------------------------------------------------    < end of copied text >  < from: TTE with Doppler (w/Cont) (06.16.22 @ 11:28) >  alve. Severe mitral regurgitation with an eccentric,  anteriorly directed jet.  2. Mildly dilated left atrium.  LA volume index = 40 cc/m2.  3. Normal left ventricular internal dimensions and wall  thicknesses.  4. Mild to moderate segmental left ventricular systolic  dysfunction.  Hypokinesis of the lateral, inferolateral,  and basal inferior walls.  5. The right ventricle is not well visualized.  Consider СВЕТЛАНА for further evaluation of the mitral valve, if  clinically indicated.    < end of copied text >      Assessment /Plan:   80 yo male with cad s/p stent x2 on plavix, ischemic cardiomyopathy w/ mild LV dysfunction, HLD, CHF, KIMMY, paroxysmal afib (not on ac due to bleed in the past?), HTN, DM2 presents to the ER with acute R sided chest pressure starting around 11AM today. Admitted for further evaluation due to elevated cardiac enzymes.    Pt s/p cardiac catherization with evidence of CAD with left main disease. PT transferred to [facility] for surgical evaluation with Dr. Perez. Labs and imaging to be reviewed with Dr. Perez.     Problem/Recommendation - 1:  ·  Problem; CAD   - EKG noted   - s/p LHC showing CAD of left main   - TTE shows EF 45%  and Severe mitral regurgitation with an eccentric, anteriorly directed jet.  Mild to moderate segmental left ventricular systolic dysfunction.  Hypokinesis of the lateral, inferolateral, and basal inferior walls  - For OR with Dr. Perez next week   - c/w ASA 81, Atorvastatin 80, Coreg 6.25 BID and  Imdur 30mg QD     Problem/Recommendation - 2:  ·  Problem; :Systolic HF (heart failure).   - TTE as noted above   - c/w lasix   - monitor electrolytes and replete as needed   -  Strict I&Os  - on Imdur, Metoprolol   - Monitor BP       Problem/Recommendation - 3:  ·  Problem; HTN   - c/w meds as noted above   - starting on Norvasc   - monitor BP       Problem/Recommendation - 4:  ·  Problem: DM   - ISS       Problem/Recommendation - 5:  ·  Problem: HLD   - c/w statin             Imani PRINGLE-[facility]   Prasanna Scott,  [facility]  Cardiovascular Medicine  [address], [address]  Office [phone]   Date of Service :   06-17-22 @ 15:55  CHIEF COMPLAINT:Patient is a 81y old  Male who presents with a chief complaint of CAD, left main disease (17 Jun 2022 10:44)      HISTORY OF PRESENT ILLNESS:HPI:  82 yo male with cad s/p stent x2 on plavix, ischemic cardiomyopathy w/ mild LV dysfunction, HLD, CHF, KIMMY, paroxysmal afib (not on ac due to bleed in the past?), HTN, DM2 presents to the ER with acute R sided chest pressure starting around 11AM today. Admitted for further evaluation due to elevated cardiac enzymes.    Pt s/p cardiac catherization with evidence of CAD with left main disease. PT transferred to [address] for surgical evaluation with Dr. Perez. Labs and imaging to be reviewed with Dr. Perez.   (17 Jun 2022 01:25)      PAST MEDICAL & SURGICAL HISTORY:  CAD (coronary artery disease)      CHF (congestive heart failure)      HTN (hypertension)      HLD (hyperlipidemia)      DM (diabetes mellitus)      KIMMY (iron deficiency anemia)      PAF (paroxysmal atrial fibrillation)      Chronic diastolic congestive heart failure, NYHA class 3      CAD (coronary artery disease)      CHF NYHA class III      DM (diabetes mellitus)      HLD (hyperlipidemia)      HTN (hypertension)      KIMMY (iron deficiency anemia)      PAF (paroxysmal atrial fibrillation)      S/P hernia repair  Lt      S/P coronary artery stent placement  2x in 4/17 in [place]      S/P coronary artery stent placement  x2 4/17 in [place]      H/O hernia repair              MEDICATIONS:  amLODIPine   Tablet 5 milliGRAM(s) Oral daily  aspirin enteric coated 81 milliGRAM(s) Oral daily  carvedilol 6.25 milliGRAM(s) Oral every 12 hours  enoxaparin Injectable 40 milliGRAM(s) SubCutaneous every 24 hours  furosemide    Tablet 80 milliGRAM(s) Oral daily  isosorbide   mononitrate ER Tablet (IMDUR) 30 milliGRAM(s) Oral daily  spironolactone 25 milliGRAM(s) Oral daily  tamsulosin 0.4 milliGRAM(s) Oral at bedtime          pantoprazole    Tablet 40 milliGRAM(s) Oral before breakfast    atorvastatin 80 milliGRAM(s) Oral at bedtime  insulin lispro (ADMELOG) corrective regimen sliding scale   SubCutaneous three times a day before meals  insulin lispro (ADMELOG) corrective regimen sliding scale   SubCutaneous at bedtime    ferrous    sulfate 325 milliGRAM(s) Oral daily  potassium chloride    Tablet ER 20 milliEquivalent(s) Oral two times a day  sodium chloride 0.9% lock flush 3 milliLiter(s) IV Push every 8 hours      FAMILY HISTORY:  No pertinent family history in first degree relatives        Non-contributory    SOCIAL HISTORY:    [ ] not a smoker    Allergies    No Known Allergies    Intolerances    	    REVIEW OF SYSTEMS:  CONSTITUTIONAL: No fever  EYES: No eye pain, visual disturbances, or discharge  ENMT:  No difficulty hearing, tinnitus  NECK: No pain or stiffness  RESPIRATORY: No cough, wheezing,  CARDIOVASCULAR: No chest pain, palpitations, passing out, dizziness, or leg swelling  GASTROINTESTINAL:  No nausea, vomiting, diarrhea or constipation. No melena.  GENITOURINARY: No dysuria, hematuria  NEUROLOGICAL: No stroke like symptoms  SKIN: No burning or lesions   ENDOCRINE: No heat or cold intolerance  MUSCULOSKELETAL: No joint pain or swelling  PSYCHIATRIC: No  anxiety, mood swings  HEME/LYMPH: No bleeding gums  ALLERGY AND IMMUNOLOGIC: No hives or eczema	    All other ROS negative    PHYSICAL EXAM:  T(C): 36.7 (06-17-22 @ 11:52), Max: 37.3 (06-17-22 @ 04:50)  HR: 69 (06-17-22 @ 11:52) (67 - 73)  BP: 134/79 (06-17-22 @ 11:52) (123/70 - 164/92)  RR: 18 (06-17-22 @ 11:52) (16 - 18)  SpO2: 98% (06-17-22 @ 11:52) (98% - 100%)  Wt(kg): --  I&O's Summary    16 Jun 2022 07:01  -  17 Jun 2022 07:00  --------------------------------------------------------  IN: 100 mL / OUT: 500 mL / NET: -400 mL    17 Jun 2022 07:01  -  17 Jun 2022 15:55  --------------------------------------------------------  IN: 720 mL / OUT: 850 mL / NET: -130 mL        Appearance: Normal	  HEENT:   Normal oral mucosa, EOMI	  Cardiovascular:  S1 S2, No JVD,    Respiratory: Lungs clear to auscultation	  Psychiatry: Alert  Gastrointestinal:  Soft, Non-tender, + BS	  Skin: No rashes   Neurologic: Non-focal  Extremities:  No edema  Vascular: Peripheral pulses palpable    	    	  	  CARDIAC MARKERS:  Labs personally reviewed by me                                  13.5   8.40  )-----------( 210      ( 17 Jun 2022 07:41 )             42.1     06-17    138  |  102  |  12  ----------------------------<  122<H>  4.2   |  23  |  0.81    Ca    10.1      17 Jun 2022 07:41  Phos  3.6     06-16  Mg     1.90     06-16    TPro  7.7  /  Alb  4.1  /  TBili  1.0  /  DBili  x   /  AST  37  /  ALT  38  /  AlkPhos  101  06-17          EKG: Personally reviewed by me - SB 53bpm PVC   Radiology: Personally reviewed by me -   < from: Xray Chest 2 Views PA/Lat (06.15.22 @ 17:00) >  IMPRESSION:  Clear lungs.      < end of copied text >  < from: TTE with Doppler (w/Cont) (06.16.22 @ 11:28) >  Ejection Fraction (Visual Estimate): 45 %  ------------------------------------------------------------------------    < end of copied text >  < from: TTE with Doppler (w/Cont) (06.16.22 @ 11:28) >  alve. Severe mitral regurgitation with an eccentric,  anteriorly directed jet.  2. Mildly dilated left atrium.  LA volume index = 40 cc/m2.  3. Normal left ventricular internal dimensions and wall  thicknesses.  4. Mild to moderate segmental left ventricular systolic  dysfunction.  Hypokinesis of the lateral, inferolateral,  and basal inferior walls.  5. The right ventricle is not well visualized.  Consider СВЕТЛАНА for further evaluation of the mitral valve, if  clinically indicated.    < end of copied text >      Assessment /Plan:   82 yo male with cad s/p stent x2 on plavix, ischemic cardiomyopathy w/ mild LV dysfunction, HLD, CHF, KIMMY, paroxysmal afib (not on ac due to bleed in the past?), HTN, DM2 presents to the ER with acute R sided chest pressure starting around 11AM today. Admitted for further evaluation due to elevated cardiac enzymes.    Pt s/p cardiac catherization with evidence of CAD with left main disease. PT transferred to [address] for surgical evaluation with Dr. Perez. Labs and imaging to be reviewed with Dr. Perez.     Problem/Recommendation - 1:  ·  Problem; CAD   - EKG noted   - s/p LHC showing CAD of left main   - TTE shows EF 45%  and Severe mitral regurgitation with an eccentric, anteriorly directed jet.  Mild to moderate segmental left ventricular systolic dysfunction.  Hypokinesis of the lateral, inferolateral, and basal inferior walls  - For OR with Dr. Perez next week   - c/w ASA 81, Atorvastatin 80, Coreg 6.25 BID and  Imdur 30mg QD     Problem/Recommendation - 2:  ·  Problem; :Systolic HF (heart failure).   - TTE as noted above   - c/w lasix   - monitor electrolytes and replete as needed   -  Strict I&Os  - on Imdur, Metoprolol   - Monitor BP       Problem/Recommendation - 3:  ·  Problem; HTN   - c/w meds as noted above   - starting on Norvasc   - monitor BP       Problem/Recommendation - 4:  ·  Problem: DM   - ISS       Problem/Recommendation - 5:  ·  Problem: HLD   - c/w statin       I had a prolonged conversation with the patient regarding hospital course, differential diagnosis and results of diagnostic tests.  Plan of care discussed with patient after the evaluation. Patient expresses clear understanding and satisfaction with the plan of care. OMT on six regions for acute somatic dysfunctions done at the bedside. Sixty five minutes spent on encounter, of which more than fifty percent of the encounter was spent on counseling and/or coordinating care by the attending physician.        Imani Condon [facility]   Prasanna Scott DO [facility]  Cardiovascular Medicine  [address], [address]  Office [phone]

## 2022-06-17 NOTE — H&P ADULT - NSHPPHYSICALEXAM_GEN_ALL_CORE
General: NAD, well appearing male sitting in bed  HEENT:  NC/AT  Neuro: A&Ox4, gait steady, speech clear, no focal deficits noted  Respiratory: B/L BS CTA, no wheeze, no rhonchi, no crackles noted  Cardiovascular: RRR, normal S1S2, no murmur noted  GI: Abd soft, NT/ND, +BSx4Q +BM  Peripheral Vascular:  B/L LE neg edema, 2+ peripheral pulses, no clubbing, cyanosis, varicosities/PVD noted  Musculoskeletal: B/L UE and LE 5/5 strength, groins stable   Psychiatric: Normal mood, normal affect observed  Skin: Normal exam to inspection and palpation. no bleeding, no hematoma.

## 2022-06-17 NOTE — H&P ADULT - HISTORY OF PRESENT ILLNESS
82 yo male with cad s/p stent x2 on plavix, ischemic cardiomyopathy w/ mild LV dysfunction, HLD, CHF, KIMMY, paroxysmal afib (not on ac due to bleed in the past?), HTN, DM2 presents to the ER with acute R sided chest pressure starting around 11AM today. Admitted for further evaluation due to elevated cardiac enzymes.    Pt s/p cardiac catherization with evidence of CAD with left main disease. PT transferred to [facility] for surgical evaluation with Dr. Perez. Labs and imaging to be reviewed with Dr. Perez.

## 2022-06-17 NOTE — PROGRESS NOTE ADULT - PROBLEM SELECTOR PLAN 1
Cath Report Pre-Ordonez: left main disease  c/w ASA 81, Atorvastatin 80, Coreg 6.25 BID  Preop w/u in progress- PFTs, TFTs, preop labs  f/u P2Y12  c/w Imdur 30mg QD  Tentative OR Date: TBD  Labs and imaging reviewed with Dr. Perez Cath Report Pre-Ordonez: left main disease  c/w ASA 81, Atorvastatin 80, Coreg 6.25 BID  c/w Imdur 30mg QD  Preop w/u in progress- PFTs, TFTs, preop labs  ck echo today   f/u P2Y12 123 today   OR planned for tue with Dr. Perez

## 2022-06-17 NOTE — PATIENT PROFILE ADULT - FALL HARM RISK - UNIVERSAL INTERVENTIONS
Bed in lowest position, wheels locked, appropriate side rails in place/Call bell, personal items and telephone in reach/Instruct patient to call for assistance before getting out of bed or chair/Non-slip footwear when patient is out of bed/[place] to call system/Physically safe environment - no spills, clutter or unnecessary equipment/Purposeful Proactive Rounding/Room/bathroom lighting operational, light cord in reach

## 2022-06-17 NOTE — PROGRESS NOTE ADULT - ASSESSMENT
82 yo male with cad s/p stent x2 on plavix, ischemic cardiomyopathy w/ mild LV dysfunction, HLD, CHF, KIMMY, paroxysmal afib (not on ac due to bleed in the past?), HTN, DM2 presents to the ER with acute R sided chest pressure starting around 11AM today. Admitted for further evaluation due to elevated cardiac enzymes.  Pt s/p cardiac catherization with evidence of CAD with left main disease. PT transferred to [facility] for surgical evaluation with Dr. Perez. Labs and imaging to be reviewed with Dr. Perez.      6/17: transferred to [facility], preop w/u in progress, f/u P2Y12 and preop labs 80 yo male with cad s/p stent x2 on plavix, ischemic cardiomyopathy w/ mild LV dysfunction, HLD, CHF, KIMMY, paroxysmal afib (not on ac due to bleed in the past?), HTN, DM2 presents to the ER with acute R sided chest pressure starting around 11AM today. Admitted for further evaluation due to elevated cardiac enzymes.  Pt s/p cardiac catherization with evidence of CAD with left main disease. PT transferred to [facility] for surgical evaluation with Dr. Perez. Labs and imaging to be reviewed with Dr. Perez.      6/17: transferred to [facility], [facility]; acc junct/ rsr 50-60; preop w/u in progress, f/u P2Y12 123 today;  ck echo  continue asa/ statin and bb; add dvt prophylaxis   OR next tue with Dr. Perez

## 2022-06-17 NOTE — H&P ADULT - NSHPLABSRESULTS_GEN_ALL_CORE
TTE WITH DOPPLER 6/16    CONCLUSIONS:  1. Mitral annular calcification, otherwise normal mitral  valve. Severe mitral regurgitation with an eccentric,  anteriorly directed jet.  2. Mildly dilated left atrium.  LA volume index = 40 cc/m2.  3. Normal left ventricular internal dimensions and wall  thicknesses.  4. Mild to moderate segmental left ventricular systolic  dysfunction.  Hypokinesis of the lateral, inferolateral,  and basal inferior walls.  5. The right ventricle is not well visualized.  Consider СВЕТЛАНА for further evaluation of the mitral valve, if  clinically indicated.

## 2022-06-18 LAB
ANION GAP SERPL CALC-SCNC: 14 MMOL/L — SIGNIFICANT CHANGE UP (ref 5–17)
BUN SERPL-MCNC: 17 MG/DL — SIGNIFICANT CHANGE UP (ref 7–23)
CALCIUM SERPL-MCNC: 9.4 MG/DL — SIGNIFICANT CHANGE UP (ref 8.4–10.5)
CHLORIDE SERPL-SCNC: 103 MMOL/L — SIGNIFICANT CHANGE UP (ref 96–108)
CO2 SERPL-SCNC: 22 MMOL/L — SIGNIFICANT CHANGE UP (ref 22–31)
CREAT SERPL-MCNC: 0.79 MG/DL — SIGNIFICANT CHANGE UP (ref 0.5–1.3)
EGFR: 90 ML/MIN/1.73M2 — SIGNIFICANT CHANGE UP
GLUCOSE BLDC GLUCOMTR-MCNC: 127 MG/DL — HIGH (ref 70–99)
GLUCOSE BLDC GLUCOMTR-MCNC: 145 MG/DL — HIGH (ref 70–99)
GLUCOSE BLDC GLUCOMTR-MCNC: 146 MG/DL — HIGH (ref 70–99)
GLUCOSE BLDC GLUCOMTR-MCNC: 153 MG/DL — HIGH (ref 70–99)
GLUCOSE SERPL-MCNC: 127 MG/DL — HIGH (ref 70–99)
HCT VFR BLD CALC: 41.5 % — SIGNIFICANT CHANGE UP (ref 39–50)
HGB BLD-MCNC: 13.4 G/DL — SIGNIFICANT CHANGE UP (ref 13–17)
MCHC RBC-ENTMCNC: 26.5 PG — LOW (ref 27–34)
MCHC RBC-ENTMCNC: 32.3 GM/DL — SIGNIFICANT CHANGE UP (ref 32–36)
MCV RBC AUTO: 82 FL — SIGNIFICANT CHANGE UP (ref 80–100)
NRBC # BLD: 0 /100 WBCS — SIGNIFICANT CHANGE UP (ref 0–0)
PLATELET # BLD AUTO: 210 K/UL — SIGNIFICANT CHANGE UP (ref 150–400)
POTASSIUM SERPL-MCNC: 4.1 MMOL/L — SIGNIFICANT CHANGE UP (ref 3.5–5.3)
POTASSIUM SERPL-SCNC: 4.1 MMOL/L — SIGNIFICANT CHANGE UP (ref 3.5–5.3)
RBC # BLD: 5.06 M/UL — SIGNIFICANT CHANGE UP (ref 4.2–5.8)
RBC # FLD: 16 % — HIGH (ref 10.3–14.5)
SODIUM SERPL-SCNC: 139 MMOL/L — SIGNIFICANT CHANGE UP (ref 135–145)
WBC # BLD: 9.47 K/UL — SIGNIFICANT CHANGE UP (ref 3.8–10.5)
WBC # FLD AUTO: 9.47 K/UL — SIGNIFICANT CHANGE UP (ref 3.8–10.5)

## 2022-06-18 PROCEDURE — 99232 SBSQ HOSP IP/OBS MODERATE 35: CPT

## 2022-06-18 RX ADMIN — SODIUM CHLORIDE 3 MILLILITER(S): 9 INJECTION INTRAMUSCULAR; INTRAVENOUS; SUBCUTANEOUS at 22:04

## 2022-06-18 RX ADMIN — ISOSORBIDE MONONITRATE 30 MILLIGRAM(S): 60 TABLET, EXTENDED RELEASE ORAL at 12:30

## 2022-06-18 RX ADMIN — SODIUM CHLORIDE 3 MILLILITER(S): 9 INJECTION INTRAMUSCULAR; INTRAVENOUS; SUBCUTANEOUS at 05:41

## 2022-06-18 RX ADMIN — PANTOPRAZOLE SODIUM 40 MILLIGRAM(S): 20 TABLET, DELAYED RELEASE ORAL at 05:27

## 2022-06-18 RX ADMIN — Medication 80 MILLIGRAM(S): at 05:26

## 2022-06-18 RX ADMIN — Medication 20 MILLIEQUIVALENT(S): at 05:30

## 2022-06-18 RX ADMIN — ATORVASTATIN CALCIUM 80 MILLIGRAM(S): 80 TABLET, FILM COATED ORAL at 22:45

## 2022-06-18 RX ADMIN — AMLODIPINE BESYLATE 5 MILLIGRAM(S): 2.5 TABLET ORAL at 05:28

## 2022-06-18 RX ADMIN — Medication 81 MILLIGRAM(S): at 12:26

## 2022-06-18 RX ADMIN — Medication 20 MILLIEQUIVALENT(S): at 17:04

## 2022-06-18 RX ADMIN — SPIRONOLACTONE 25 MILLIGRAM(S): 25 TABLET, FILM COATED ORAL at 05:28

## 2022-06-18 RX ADMIN — CARVEDILOL PHOSPHATE 6.25 MILLIGRAM(S): 80 CAPSULE, EXTENDED RELEASE ORAL at 05:28

## 2022-06-18 RX ADMIN — TAMSULOSIN HYDROCHLORIDE 0.4 MILLIGRAM(S): 0.4 CAPSULE ORAL at 22:45

## 2022-06-18 RX ADMIN — CARVEDILOL PHOSPHATE 6.25 MILLIGRAM(S): 80 CAPSULE, EXTENDED RELEASE ORAL at 17:04

## 2022-06-18 RX ADMIN — Medication 325 MILLIGRAM(S): at 12:25

## 2022-06-18 RX ADMIN — ENOXAPARIN SODIUM 40 MILLIGRAM(S): 100 INJECTION SUBCUTANEOUS at 12:25

## 2022-06-18 NOTE — PROGRESS NOTE ADULT - SUBJECTIVE AND OBJECTIVE BOX
DATE OF SERVICE: 06-18-22 @ 13:27    Patient is a 81y old  Male who presents with a chief complaint of CAD, left main disease (18 Jun 2022 09:36)      INTERVAL HISTORY: Feels ok.     REVIEW OF SYSTEMS:  CONSTITUTIONAL: No weakness  EYES/ENT: No visual changes;  No throat pain   NECK: No pain or stiffness  RESPIRATORY: No cough, wheezing; No shortness of breath  CARDIOVASCULAR: No chest pain or palpitations  GASTROINTESTINAL: No abdominal  pain. No nausea, vomiting, or hematemesis  GENITOURINARY: No dysuria, frequency or hematuria  NEUROLOGICAL: No stroke like symptoms  SKIN: No rashes    TELEMETRY Personally reviewed: SR 70-80s PVCs  	  MEDICATIONS:  amLODIPine   Tablet 5 milliGRAM(s) Oral daily  carvedilol 6.25 milliGRAM(s) Oral every 12 hours  furosemide    Tablet 80 milliGRAM(s) Oral daily  isosorbide   mononitrate ER Tablet (IMDUR) 30 milliGRAM(s) Oral daily  spironolactone 25 milliGRAM(s) Oral daily  tamsulosin 0.4 milliGRAM(s) Oral at bedtime        PHYSICAL EXAM:  T(C): 36.8 (06-18-22 @ 04:41), Max: 37.1 (06-17-22 @ 19:50)  HR: 69 (06-18-22 @ 04:41) (69 - 77)  BP: 122/74 (06-18-22 @ 04:41) (122/74 - 157/89)  RR: 18 (06-18-22 @ 04:41) (18 - 18)  SpO2: 98% (06-18-22 @ 04:41) (98% - 98%)  Wt(kg): --  I&O's Summary    17 Jun 2022 07:01  -  18 Jun 2022 07:00  --------------------------------------------------------  IN: 1000 mL / OUT: 1550 mL / NET: -550 mL    18 Jun 2022 07:01  -  18 Jun 2022 13:27  --------------------------------------------------------  IN: 0 mL / OUT: 650 mL / NET: -650 mL          Appearance: In no distress	  HEENT:    PERRL, EOMI	  Cardiovascular:  S1 S2, No JVD  Respiratory: Lungs clear to auscultation	  Gastrointestinal:  Soft, Non-tender, + BS	  Vascularature:  No edema of LE  Psychiatric: Appropriate affect   Neuro: no acute focal deficits                               13.4   9.47  )-----------( 210      ( 18 Jun 2022 06:08 )             41.5     06-18    139  |  103  |  17  ----------------------------<  127<H>  4.1   |  22  |  0.79    Ca    9.4      18 Jun 2022 06:08    TPro  7.7  /  Alb  4.1  /  TBili  1.0  /  DBili  x   /  AST  37  /  ALT  38  /  AlkPhos  101  06-17        Labs personally reviewed      ASSESSMENT/PLAN: 	    82 yo male with cad s/p stent x2 on plavix, ischemic cardiomyopathy w/ mild LV dysfunction, HLD, CHF, KIMMY, paroxysmal afib (not on ac due to bleed in the past?), HTN, DM2 presents to the ER with acute R sided chest pressure starting around 11AM today. Admitted for further evaluation due to elevated cardiac enzymes.    Pt s/p cardiac catherization with evidence of CAD with left main disease. PT transferred to [facility] for surgical evaluation with Dr. Perez. Labs and imaging to be reviewed with Dr. Perez.     Problem/Recommendation - 1:  ·  Problem; CAD   - EKG noted   - s/p LHC showing CAD of left main   - TTE shows EF 45%  and Severe mitral regurgitation with an eccentric, anteriorly directed jet.  Mild to moderate segmental left ventricular systolic dysfunction.  Hypokinesis of the lateral, inferolateral, and basal inferior walls  - For OR with Dr. Perez next week   - c/w ASA 81, Atorvastatin 80, Coreg 6.25 BID and  Imdur 30mg QD     Problem/Recommendation - 2:  ·  Problem; :Systolic HF (heart failure).   - TTE as noted above   - c/w lasix   - monitor electrolytes and replete as needed   -  Strict I&Os  - on Imdur, Metoprolol   - Monitor BP       Problem/Recommendation - 3:  ·  Problem; HTN   - c/w meds as noted above   - starting on Norvasc   - monitor BP       Problem/Recommendation - 4:  ·  Problem: DM   - ISS       Problem/Recommendation - 5:  ·  Problem: HLD   - c/w statin         Beba Arias, GERALD Scott DO [facility]  Cardiovascular Medicine  [address] [place] [address], [address]  Office: [phone]  Cell: [phone]

## 2022-06-18 NOTE — PROGRESS NOTE ADULT - SUBJECTIVE AND OBJECTIVE BOX
VITAL SIGNS    Telemetry:    Vital Signs Last 24 Hrs  T(C): 36.8 (06-18-22 @ 04:41), Max: 37.1 (06-17-22 @ 19:50)  T(F): 98.2 (06-18-22 @ 04:41), Max: 98.8 (06-17-22 @ 19:50)  HR: 69 (06-18-22 @ 04:41) (69 - 77)  BP: 122/74 (06-18-22 @ 04:41) (122/74 - 157/89)  RR: 18 (06-18-22 @ 04:41) (18 - 18)  SpO2: 98% (06-18-22 @ 04:41) (98% - 98%)            06-17 @ 07:01  -  06-18 @ 07:00  --------------------------------------------------------  IN: 1000 mL / OUT: 1550 mL / NET: -550 mL       Daily     Daily   Admit Wt: Drug Dosing Weight  Height (cm): 170.2 (17 Jun 2022 00:03)  Weight (kg): 68.8 (17 Jun 2022 00:03)  BMI (kg/m2): 23.8 (17 Jun 2022 00:03)  BSA (m2): 1.8 (17 Jun 2022 00:03)      CAPILLARY BLOOD GLUCOSE      POCT Blood Glucose.: 145 mg/dL (18 Jun 2022 07:34)  POCT Blood Glucose.: 131 mg/dL (17 Jun 2022 21:45)  POCT Blood Glucose.: 119 mg/dL (17 Jun 2022 16:29)  POCT Blood Glucose.: 143 mg/dL (17 Jun 2022 11:33)          amLODIPine   Tablet 5 milliGRAM(s) Oral daily  aspirin enteric coated 81 milliGRAM(s) Oral daily  atorvastatin 80 milliGRAM(s) Oral at bedtime  carvedilol 6.25 milliGRAM(s) Oral every 12 hours  enoxaparin Injectable 40 milliGRAM(s) SubCutaneous every 24 hours  ferrous    sulfate 325 milliGRAM(s) Oral daily  furosemide    Tablet 80 milliGRAM(s) Oral daily  insulin lispro (ADMELOG) corrective regimen sliding scale   SubCutaneous three times a day before meals  insulin lispro (ADMELOG) corrective regimen sliding scale   SubCutaneous at bedtime  isosorbide   mononitrate ER Tablet (IMDUR) 30 milliGRAM(s) Oral daily  pantoprazole    Tablet 40 milliGRAM(s) Oral before breakfast  potassium chloride    Tablet ER 20 milliEquivalent(s) Oral two times a day  sodium chloride 0.9% lock flush 3 milliLiter(s) IV Push every 8 hours  spironolactone 25 milliGRAM(s) Oral daily  tamsulosin 0.4 milliGRAM(s) Oral at bedtime      PHYSICAL EXAM    Subjective: "Hi.   Neurology: alert and oriented x 3, nonfocal, no gross deficits  CV : tele:  RSR  Sternal Wound :  CDI with dressing , Stable  Lungs: clear. RR easy, unlabored   Abdomen: soft, nontender, nondistended, positive bowel sounds, bowel movement   Neg N/V/D   :  pt voiding without difficulty   Extremities:   SHELBY; edema, neg calf tenderness.   PPP bilaterally      PW:  Chest tubes:                 VITAL SIGNS    Telemetry:  acc junct 60-70   Vital Signs Last 24 Hrs  T(C): 36.8 (06-18-22 @ 04:41), Max: 37.1 (06-17-22 @ 19:50)  T(F): 98.2 (06-18-22 @ 04:41), Max: 98.8 (06-17-22 @ 19:50)  HR: 69 (06-18-22 @ 04:41) (69 - 77)  BP: 122/74 (06-18-22 @ 04:41) (122/74 - 157/89)  RR: 18 (06-18-22 @ 04:41) (18 - 18)  SpO2: 98% (06-18-22 @ 04:41) (98% - 98%)            06-17 @ 07:01  -  06-18 @ 07:00  --------------------------------------------------------  IN: 1000 mL / OUT: 1550 mL / NET: -550 mL       Daily     Daily   Admit Wt: Drug Dosing Weight  Height (cm): 170.2 (17 Jun 2022 00:03)  Weight (kg): 68.8 (17 Jun 2022 00:03)  BMI (kg/m2): 23.8 (17 Jun 2022 00:03)  BSA (m2): 1.8 (17 Jun 2022 00:03)      CAPILLARY BLOOD GLUCOSE      POCT Blood Glucose.: 145 mg/dL (18 Jun 2022 07:34)  POCT Blood Glucose.: 131 mg/dL (17 Jun 2022 21:45)  POCT Blood Glucose.: 119 mg/dL (17 Jun 2022 16:29)  POCT Blood Glucose.: 143 mg/dL (17 Jun 2022 11:33)          amLODIPine   Tablet 5 milliGRAM(s) Oral daily  aspirin enteric coated 81 milliGRAM(s) Oral daily  atorvastatin 80 milliGRAM(s) Oral at bedtime  carvedilol 6.25 milliGRAM(s) Oral every 12 hours  enoxaparin Injectable 40 milliGRAM(s) SubCutaneous every 24 hours  ferrous    sulfate 325 milliGRAM(s) Oral daily  furosemide    Tablet 80 milliGRAM(s) Oral daily  insulin lispro (ADMELOG) corrective regimen sliding scale   SubCutaneous three times a day before meals  insulin lispro (ADMELOG) corrective regimen sliding scale   SubCutaneous at bedtime  isosorbide   mononitrate ER Tablet (IMDUR) 30 milliGRAM(s) Oral daily  pantoprazole    Tablet 40 milliGRAM(s) Oral before breakfast  potassium chloride    Tablet ER 20 milliEquivalent(s) Oral two times a day  sodium chloride 0.9% lock flush 3 milliLiter(s) IV Push every 8 hours  spironolactone 25 milliGRAM(s) Oral daily  tamsulosin 0.4 milliGRAM(s) Oral at bedtime      PHYSICAL EXAM    Subjective: "I feel ok."   Neurology: alert and oriented x 3, nonfocal, no gross deficits  CV : tele: acc junct/ rsr 60-70  Lungs: clear. RR easy, unlabored   Abdomen: soft, nontender, nondistended, positive bowel sounds, + bowel movement   Neg N/V/D   :  pt voiding without difficulty   Extremities:   SHELBY; neg Le edema, neg calf tenderness.   PPP bilaterally; rt groin cath site cdi melanie- neg bleeding/ hematoma

## 2022-06-18 NOTE — PROGRESS NOTE ADULT - PROBLEM SELECTOR PLAN 1
Cath Report Pre-Ordonez: left main disease  c/w ASA 81, Atorvastatin 80, Coreg 6.25 BID  c/w Imdur 30mg QD  Preop w/u in progress- PFTs, TFTs, preop labs  ck echo today   f/u P2Y12 123 today   OR planned for tue with Dr. Perez Cath Report Pre-Ordonez: left main disease  c/w ASA 81, Atorvastatin 80, Coreg 6.25 BID  c/w Imdur 30mg QD  Preop w/u in progress- PFTs, TFTs, preop labs  echo done 6/17  f/u P2Y12 123 today - repeat in am sun   OR planned for thur with Dr. Perez

## 2022-06-18 NOTE — PROGRESS NOTE ADULT - ASSESSMENT
82 yo male with cad s/p stent x2 on plavix, ischemic cardiomyopathy w/ mild LV dysfunction, HLD, CHF, KIMMY, paroxysmal afib (not on ac due to bleed in the past?), HTN, DM2 presents to the ER with acute R sided chest pressure starting around 11AM today. Admitted for further evaluation due to elevated cardiac enzymes.  Pt s/p cardiac catherization with evidence of CAD with left main disease. PT transferred to [facility] for surgical evaluation with Dr. Perez. Labs and imaging to be reviewed with Dr. Perez.      6/17: transferred to [facility], [facility]; acc junct/ rsr 50-60; preop w/u in progress, f/u P2Y12 123 today;  ck echo  continue asa/ statin and bb; add dvt prophylaxis   OR next tue with Dr. Perez  82 yo male with cad s/p stent x2 on plavix, ischemic cardiomyopathy w/ mild LV dysfunction, HLD, CHF, KIMMY, paroxysmal afib (not on ac due to bleed in the past?), HTN, DM2 presents to the ER with acute R sided chest pressure starting around 11AM today. Admitted for further evaluation due to elevated cardiac enzymes.  Pt s/p cardiac catherization with evidence of CAD with left main disease. PT transferred to [facility] for surgical evaluation with Dr. Perez. Labs and imaging to be reviewed with Dr. Perez.      6/17: transferred to [facility], [facility]; acc junct/ rsr 50-60; preop w/u in progress, f/u P2Y12 123 today;  ck echo: mod MR; mild LV systolic dysfunction   continue asa/ statin and bb; add dvt prophylaxis   OR next thur with Dr. Perez

## 2022-06-19 LAB
ANION GAP SERPL CALC-SCNC: 13 MMOL/L — SIGNIFICANT CHANGE UP (ref 5–17)
BUN SERPL-MCNC: 18 MG/DL — SIGNIFICANT CHANGE UP (ref 7–23)
CALCIUM SERPL-MCNC: 10.2 MG/DL — SIGNIFICANT CHANGE UP (ref 8.4–10.5)
CHLORIDE SERPL-SCNC: 103 MMOL/L — SIGNIFICANT CHANGE UP (ref 96–108)
CO2 SERPL-SCNC: 23 MMOL/L — SIGNIFICANT CHANGE UP (ref 22–31)
CREAT SERPL-MCNC: 0.92 MG/DL — SIGNIFICANT CHANGE UP (ref 0.5–1.3)
EGFR: 85 ML/MIN/1.73M2 — SIGNIFICANT CHANGE UP
GLUCOSE BLDC GLUCOMTR-MCNC: 114 MG/DL — HIGH (ref 70–99)
GLUCOSE BLDC GLUCOMTR-MCNC: 117 MG/DL — HIGH (ref 70–99)
GLUCOSE BLDC GLUCOMTR-MCNC: 126 MG/DL — HIGH (ref 70–99)
GLUCOSE BLDC GLUCOMTR-MCNC: 129 MG/DL — HIGH (ref 70–99)
GLUCOSE SERPL-MCNC: 133 MG/DL — HIGH (ref 70–99)
HCT VFR BLD CALC: 42.5 % — SIGNIFICANT CHANGE UP (ref 39–50)
HGB BLD-MCNC: 13.6 G/DL — SIGNIFICANT CHANGE UP (ref 13–17)
MCHC RBC-ENTMCNC: 26.9 PG — LOW (ref 27–34)
MCHC RBC-ENTMCNC: 32 GM/DL — SIGNIFICANT CHANGE UP (ref 32–36)
MCV RBC AUTO: 84.2 FL — SIGNIFICANT CHANGE UP (ref 80–100)
NRBC # BLD: 0 /100 WBCS — SIGNIFICANT CHANGE UP (ref 0–0)
PA ADP PRP-ACNC: 133 PRU — LOW (ref 194–417)
PLATELET # BLD AUTO: 220 K/UL — SIGNIFICANT CHANGE UP (ref 150–400)
POTASSIUM SERPL-MCNC: 4.5 MMOL/L — SIGNIFICANT CHANGE UP (ref 3.5–5.3)
POTASSIUM SERPL-SCNC: 4.5 MMOL/L — SIGNIFICANT CHANGE UP (ref 3.5–5.3)
RBC # BLD: 5.05 M/UL — SIGNIFICANT CHANGE UP (ref 4.2–5.8)
RBC # FLD: 16.1 % — HIGH (ref 10.3–14.5)
SODIUM SERPL-SCNC: 139 MMOL/L — SIGNIFICANT CHANGE UP (ref 135–145)
WBC # BLD: 9.26 K/UL — SIGNIFICANT CHANGE UP (ref 3.8–10.5)
WBC # FLD AUTO: 9.26 K/UL — SIGNIFICANT CHANGE UP (ref 3.8–10.5)

## 2022-06-19 PROCEDURE — 99231 SBSQ HOSP IP/OBS SF/LOW 25: CPT

## 2022-06-19 RX ADMIN — TAMSULOSIN HYDROCHLORIDE 0.4 MILLIGRAM(S): 0.4 CAPSULE ORAL at 21:31

## 2022-06-19 RX ADMIN — CARVEDILOL PHOSPHATE 6.25 MILLIGRAM(S): 80 CAPSULE, EXTENDED RELEASE ORAL at 06:38

## 2022-06-19 RX ADMIN — PANTOPRAZOLE SODIUM 40 MILLIGRAM(S): 20 TABLET, DELAYED RELEASE ORAL at 06:38

## 2022-06-19 RX ADMIN — Medication 80 MILLIGRAM(S): at 06:38

## 2022-06-19 RX ADMIN — SODIUM CHLORIDE 3 MILLILITER(S): 9 INJECTION INTRAMUSCULAR; INTRAVENOUS; SUBCUTANEOUS at 13:36

## 2022-06-19 RX ADMIN — Medication 20 MILLIEQUIVALENT(S): at 17:07

## 2022-06-19 RX ADMIN — ENOXAPARIN SODIUM 40 MILLIGRAM(S): 100 INJECTION SUBCUTANEOUS at 12:08

## 2022-06-19 RX ADMIN — AMLODIPINE BESYLATE 5 MILLIGRAM(S): 2.5 TABLET ORAL at 06:38

## 2022-06-19 RX ADMIN — ISOSORBIDE MONONITRATE 30 MILLIGRAM(S): 60 TABLET, EXTENDED RELEASE ORAL at 12:08

## 2022-06-19 RX ADMIN — Medication 81 MILLIGRAM(S): at 12:08

## 2022-06-19 RX ADMIN — Medication 20 MILLIEQUIVALENT(S): at 06:38

## 2022-06-19 RX ADMIN — Medication 325 MILLIGRAM(S): at 12:08

## 2022-06-19 RX ADMIN — SODIUM CHLORIDE 3 MILLILITER(S): 9 INJECTION INTRAMUSCULAR; INTRAVENOUS; SUBCUTANEOUS at 05:56

## 2022-06-19 RX ADMIN — ATORVASTATIN CALCIUM 80 MILLIGRAM(S): 80 TABLET, FILM COATED ORAL at 21:31

## 2022-06-19 RX ADMIN — SPIRONOLACTONE 25 MILLIGRAM(S): 25 TABLET, FILM COATED ORAL at 06:38

## 2022-06-19 RX ADMIN — SODIUM CHLORIDE 3 MILLILITER(S): 9 INJECTION INTRAMUSCULAR; INTRAVENOUS; SUBCUTANEOUS at 21:36

## 2022-06-19 RX ADMIN — CARVEDILOL PHOSPHATE 6.25 MILLIGRAM(S): 80 CAPSULE, EXTENDED RELEASE ORAL at 17:07

## 2022-06-19 NOTE — PROGRESS NOTE ADULT - ASSESSMENT
82 yo male with cad s/p stent x2 on plavix, ischemic cardiomyopathy w/ mild LV dysfunction, HLD, CHF, KIMMY, paroxysmal afib (not on ac due to bleed in the past?), HTN, DM2 presents to the ER with acute R sided chest pressure starting around 11AM today. Admitted for further evaluation due to elevated cardiac enzymes.  Pt s/p cardiac catherization with evidence of CAD with left main disease. PT transferred to [facility] for surgical evaluation with Dr. Perez. Labs and imaging to be reviewed with Dr. Perez.      6/17: transferred to [facility]; acc junct/ rsr 50-60; preop w/u in progress, f/u P2Y12 123 today;  ck echo: mod MR; mild LV systolic dysfunction   continue asa/ statin and bb; add dvt prophylaxis   OR next thur with Dr. Perez   6/19 80 yo male with cad s/p stent x2 on plavix, ischemic cardiomyopathy w/ mild LV dysfunction, HLD, CHF, KIMMY, paroxysmal afib (not on ac due to bleed in the past?), HTN, DM2 presents to the ER with acute R sided chest pressure starting around 11AM today. Admitted for further evaluation due to elevated cardiac enzymes.  Pt s/p cardiac catherization with evidence of CAD with left main disease. PT transferred to [facility] for surgical evaluation with Dr. Perez. Labs and imaging to be reviewed with Dr. Perez.      6/17: transferred to [facility] [place]; acc junct/ rsr 50-60; preop w/u in progress, f/u P2Y12 123 today;  ck echo: mod MR; mild LV systolic dysfunction   continue asa/ statin and bb; add dvt prophylaxis   OR next thur with Dr. Perez   6/19  [facility]    p2y12    133

## 2022-06-19 NOTE — PROGRESS NOTE ADULT - PROBLEM SELECTOR PLAN 4
DASH diet  c/w Coreg 6.25 BID  Start Norvasc 5mg  Hold Lisinopril for renal protection pre-op DASH diet  c/w Coreg 6.25 BID   Norvasc 5mg  Hold Lisinopril for renal protection pre-op

## 2022-06-19 NOTE — PROGRESS NOTE ADULT - SUBJECTIVE AND OBJECTIVE BOX
VITAL SIGNS    Telemetry:      Vital Signs Last 24 Hrs  T(C): 36.6 (06-19-22 @ 04:25), Max: 36.7 (06-18-22 @ 13:00)  T(F): 97.9 (06-19-22 @ 04:25), Max: 98 (06-18-22 @ 13:00)  HR: 69 (06-19-22 @ 04:25) (69 - 89)  BP: 130/77 (06-19-22 @ 04:25) (130/77 - 165/86)  RR: 18 (06-19-22 @ 04:25) (18 - 18)  SpO2: 98% (06-19-22 @ 04:25) (98% - 99%)                   Daily     Daily         CAPILLARY BLOOD GLUCOSE      POCT Blood Glucose.: 153 mg/dL (18 Jun 2022 21:49)  POCT Blood Glucose.: 127 mg/dL (18 Jun 2022 16:34)  POCT Blood Glucose.: 146 mg/dL (18 Jun 2022 11:31)  POCT Blood Glucose.: 145 mg/dL (18 Jun 2022 07:34                    PHYSICAL EXAM  s"  Neurology: alert and oriented x 3, moves all extremities with no defecits  CV :    Lungs:   CTA B/L  Abdomen: soft, nontender, nondistended, positive bowel sounds, last bowel movement   Extremities:                                            VITAL SIGNS    Tele    SR  60    Vital Signs Last 24 Hrs  T(C): 36.6 (06-19-22 @ 04:25), Max: 36.7 (06-18-22 @ 13:00)  T(F): 97.9 (06-19-22 @ 04:25), Max: 98 (06-18-22 @ 13:00)  HR: 69 (06-19-22 @ 04:25) (69 - 89)  BP: 130/77 (06-19-22 @ 04:25) (130/77 - 165/86)  RR: 18 (06-19-22 @ 04:25) (18 - 18)  SpO2: 98% (06-19-22 @ 04:25) (98% - 99%)                   Daily     Daily         CAPILLARY BLOOD GLUCOSE      POCT Blood Glucose.: 153 mg/dL (18 Jun 2022 21:49)  POCT Blood Glucose.: 127 mg/dL (18 Jun 2022 16:34)  POCT Blood Glucose.: 146 mg/dL (18 Jun 2022 11:31)  POCT Blood Glucose.: 145 mg/dL (18 Jun 2022 07:34                    PHYSICAL EXAM  s"  No chest pain  No SOB"  Neurology: alert and oriented x 3, moves all extremities with no defecits  CV :    Lungs:   CTA B/L  Abdomen: soft, nontender, nondistended, positive bowel sounds,   Extremities:     no edema

## 2022-06-19 NOTE — PROGRESS NOTE ADULT - PROBLEM SELECTOR PLAN 1
Cath Report Pre-Ordonez: left main disease  c/w ASA 81, Atorvastatin 80, Coreg 6.25 BID  c/w Imdur 30mg QD  Preop w/u in progress- PFTs, TFTs, preop labs  f/u P2Y12 123 today - repeat  today  OR planned for thur with Dr. Perez Cath Report Pre-Ordonez: left main disease  c/w ASA 81, Atorvastatin 80, Coreg 6.25 BID  c/w Imdur 30mg QD    f/u P2Y12 133 today -   OR planned for thur with Dr. Perez

## 2022-06-19 NOTE — PROGRESS NOTE ADULT - SUBJECTIVE AND OBJECTIVE BOX
DATE OF SERVICE: 06-19-22 @ 10:33    Patient is a 81y old  Male who presents with a chief complaint of CAD, left main disease (19 Jun 2022 07:09)      INTERVAL HISTORY: Feels ok.     REVIEW OF SYSTEMS:  CONSTITUTIONAL: No weakness  EYES/ENT: No visual changes;  No throat pain   NECK: No pain or stiffness  RESPIRATORY: No cough, wheezing; No shortness of breath  CARDIOVASCULAR: No chest pain or palpitations  GASTROINTESTINAL: No abdominal  pain. No nausea, vomiting, or hematemesis  GENITOURINARY: No dysuria, frequency or hematuria  NEUROLOGICAL: No stroke like symptoms  SKIN: No rashes    TELEMETRY Personally reviewed: SR   	  MEDICATIONS:  amLODIPine   Tablet 5 milliGRAM(s) Oral daily  carvedilol 6.25 milliGRAM(s) Oral every 12 hours  furosemide    Tablet 80 milliGRAM(s) Oral daily  isosorbide   mononitrate ER Tablet (IMDUR) 30 milliGRAM(s) Oral daily  spironolactone 25 milliGRAM(s) Oral daily  tamsulosin 0.4 milliGRAM(s) Oral at bedtime        PHYSICAL EXAM:  T(C): 36.6 (06-19-22 @ 04:25), Max: 36.7 (06-18-22 @ 13:00)  HR: 69 (06-19-22 @ 04:25) (69 - 89)  BP: 130/77 (06-19-22 @ 04:25) (130/77 - 165/86)  RR: 18 (06-19-22 @ 04:25) (18 - 18)  SpO2: 98% (06-19-22 @ 04:25) (98% - 99%)  Wt(kg): --  I&O's Summary    18 Jun 2022 07:01  -  19 Jun 2022 07:00  --------------------------------------------------------  IN: 680 mL / OUT: 1270 mL / NET: -590 mL          Appearance: In no distress	  HEENT:    PERRL, EOMI	  Cardiovascular:  S1 S2, No JVD  Respiratory: Lungs clear to auscultation	  Gastrointestinal:  Soft, Non-tender, + BS	  Vascularature:  No edema of LE  Psychiatric: Appropriate affect   Neuro: no acute focal deficits                               13.6   9.26  )-----------( 220      ( 19 Jun 2022 07:53 )             42.5     06-19    139  |  103  |  18  ----------------------------<  133<H>  4.5   |  23  |  0.92    Ca    10.2      19 Jun 2022 07:53          Labs personally reviewed      ASSESSMENT/PLAN: 	    82 yo male with cad s/p stent x2 on plavix, ischemic cardiomyopathy w/ mild LV dysfunction, HLD, CHF, KIMMY, paroxysmal afib (not on ac due to bleed in the past?), HTN, DM2 presents to the ER with acute R sided chest pressure starting around 11AM today. Admitted for further evaluation due to elevated cardiac enzymes.    Pt s/p cardiac catherization with evidence of CAD with left main disease. PT transferred to [facility] for surgical evaluation with Dr. Perez. Labs and imaging to be reviewed with Dr. Perez.     Problem/Recommendation - 1:  ·  Problem; CAD   - EKG noted   - s/p LHC showing CAD of left main   - TTE shows EF 45%  and Severe mitral regurgitation with an eccentric, anteriorly directed jet.  Mild to moderate segmental left ventricular systolic dysfunction.  Hypokinesis of the lateral, inferolateral, and basal inferior walls  - For OR with Dr. Perez next week   - c/w ASA 81, Atorvastatin 80, Coreg 6.25 BID and  Imdur 30mg QD     Problem/Recommendation - 2:  ·  Problem; :Systolic HF (heart failure).   - TTE as noted above   - c/w lasix   - monitor electrolytes and replete as needed   -  Strict I&Os  - on Imdur, Metoprolol, coreg, spironolactone  - Monitor BP       Problem/Recommendation - 3:  ·  Problem; HTN   - c/w meds as noted above   - starting on Norvasc   - monitor BP       Problem/Recommendation - 4:  ·  Problem: DM   - ISS       Problem/Recommendation - 5:  ·  Problem: HLD   - c/w statin           Beba Arias, CARLA-NP   Prasanna Scott DO [facility]  Cardiovascular Medicine  [address], [address]  Office: [phone]  Cell: [phone]

## 2022-06-20 LAB
ANION GAP SERPL CALC-SCNC: 13 MMOL/L — SIGNIFICANT CHANGE UP (ref 5–17)
BUN SERPL-MCNC: 26 MG/DL — HIGH (ref 7–23)
CALCIUM SERPL-MCNC: 10.1 MG/DL — SIGNIFICANT CHANGE UP (ref 8.4–10.5)
CHLORIDE SERPL-SCNC: 103 MMOL/L — SIGNIFICANT CHANGE UP (ref 96–108)
CO2 SERPL-SCNC: 22 MMOL/L — SIGNIFICANT CHANGE UP (ref 22–31)
CREAT SERPL-MCNC: 0.87 MG/DL — SIGNIFICANT CHANGE UP (ref 0.5–1.3)
EGFR: 88 ML/MIN/1.73M2 — SIGNIFICANT CHANGE UP
GLUCOSE BLDC GLUCOMTR-MCNC: 130 MG/DL — HIGH (ref 70–99)
GLUCOSE BLDC GLUCOMTR-MCNC: 154 MG/DL — HIGH (ref 70–99)
GLUCOSE BLDC GLUCOMTR-MCNC: 157 MG/DL — HIGH (ref 70–99)
GLUCOSE BLDC GLUCOMTR-MCNC: 166 MG/DL — HIGH (ref 70–99)
GLUCOSE SERPL-MCNC: 117 MG/DL — HIGH (ref 70–99)
HCT VFR BLD CALC: 41.8 % — SIGNIFICANT CHANGE UP (ref 39–50)
HGB BLD-MCNC: 13.3 G/DL — SIGNIFICANT CHANGE UP (ref 13–17)
MCHC RBC-ENTMCNC: 26.7 PG — LOW (ref 27–34)
MCHC RBC-ENTMCNC: 31.8 GM/DL — LOW (ref 32–36)
MCV RBC AUTO: 83.9 FL — SIGNIFICANT CHANGE UP (ref 80–100)
NRBC # BLD: 0 /100 WBCS — SIGNIFICANT CHANGE UP (ref 0–0)
PLATELET # BLD AUTO: 211 K/UL — SIGNIFICANT CHANGE UP (ref 150–400)
POTASSIUM SERPL-MCNC: 4.6 MMOL/L — SIGNIFICANT CHANGE UP (ref 3.5–5.3)
POTASSIUM SERPL-SCNC: 4.6 MMOL/L — SIGNIFICANT CHANGE UP (ref 3.5–5.3)
RBC # BLD: 4.98 M/UL — SIGNIFICANT CHANGE UP (ref 4.2–5.8)
RBC # FLD: 16.3 % — HIGH (ref 10.3–14.5)
SODIUM SERPL-SCNC: 138 MMOL/L — SIGNIFICANT CHANGE UP (ref 135–145)
WBC # BLD: 7.9 K/UL — SIGNIFICANT CHANGE UP (ref 3.8–10.5)
WBC # FLD AUTO: 7.9 K/UL — SIGNIFICANT CHANGE UP (ref 3.8–10.5)

## 2022-06-20 PROCEDURE — 99232 SBSQ HOSP IP/OBS MODERATE 35: CPT

## 2022-06-20 RX ADMIN — Medication 20 MILLIEQUIVALENT(S): at 17:21

## 2022-06-20 RX ADMIN — SODIUM CHLORIDE 3 MILLILITER(S): 9 INJECTION INTRAMUSCULAR; INTRAVENOUS; SUBCUTANEOUS at 21:56

## 2022-06-20 RX ADMIN — CARVEDILOL PHOSPHATE 6.25 MILLIGRAM(S): 80 CAPSULE, EXTENDED RELEASE ORAL at 05:35

## 2022-06-20 RX ADMIN — PANTOPRAZOLE SODIUM 40 MILLIGRAM(S): 20 TABLET, DELAYED RELEASE ORAL at 05:36

## 2022-06-20 RX ADMIN — ATORVASTATIN CALCIUM 80 MILLIGRAM(S): 80 TABLET, FILM COATED ORAL at 21:45

## 2022-06-20 RX ADMIN — Medication 325 MILLIGRAM(S): at 12:55

## 2022-06-20 RX ADMIN — SODIUM CHLORIDE 3 MILLILITER(S): 9 INJECTION INTRAMUSCULAR; INTRAVENOUS; SUBCUTANEOUS at 14:00

## 2022-06-20 RX ADMIN — ENOXAPARIN SODIUM 40 MILLIGRAM(S): 100 INJECTION SUBCUTANEOUS at 12:54

## 2022-06-20 RX ADMIN — SODIUM CHLORIDE 3 MILLILITER(S): 9 INJECTION INTRAMUSCULAR; INTRAVENOUS; SUBCUTANEOUS at 05:41

## 2022-06-20 RX ADMIN — SODIUM CHLORIDE 3 MILLILITER(S): 9 INJECTION INTRAMUSCULAR; INTRAVENOUS; SUBCUTANEOUS at 12:47

## 2022-06-20 RX ADMIN — AMLODIPINE BESYLATE 5 MILLIGRAM(S): 2.5 TABLET ORAL at 05:34

## 2022-06-20 RX ADMIN — Medication 81 MILLIGRAM(S): at 12:55

## 2022-06-20 RX ADMIN — ISOSORBIDE MONONITRATE 30 MILLIGRAM(S): 60 TABLET, EXTENDED RELEASE ORAL at 12:55

## 2022-06-20 RX ADMIN — Medication 1: at 12:30

## 2022-06-20 RX ADMIN — Medication 80 MILLIGRAM(S): at 05:35

## 2022-06-20 RX ADMIN — TAMSULOSIN HYDROCHLORIDE 0.4 MILLIGRAM(S): 0.4 CAPSULE ORAL at 21:45

## 2022-06-20 RX ADMIN — Medication 1: at 17:20

## 2022-06-20 RX ADMIN — SPIRONOLACTONE 25 MILLIGRAM(S): 25 TABLET, FILM COATED ORAL at 05:35

## 2022-06-20 RX ADMIN — Medication 20 MILLIEQUIVALENT(S): at 05:40

## 2022-06-20 RX ADMIN — CARVEDILOL PHOSPHATE 6.25 MILLIGRAM(S): 80 CAPSULE, EXTENDED RELEASE ORAL at 17:21

## 2022-06-20 NOTE — PROGRESS NOTE ADULT - PROBLEM SELECTOR PLAN 1
Cath Report Pre-Ordonez: left main disease  c/w ASA 81, Atorvastatin 80, Coreg 6.25 BID  c/w Imdur 30mg QD    f/u P2Y12 133 today -   OR planned for thur with Dr. Perez Cath Report Pre-Ordonez: left main disease  c/w ASA 81, Atorvastatin 80, Coreg 6.25 BID  c/w Imdur 30mg QD  f/u P2Y12 133 6/19- repeat level in am 6/21  OR planned for thur with Dr. Perez

## 2022-06-20 NOTE — PROGRESS NOTE ADULT - SUBJECTIVE AND OBJECTIVE BOX
VITAL SIGNS    Telemetry:    Vital Signs Last 24 Hrs  T(C): 36.3 (22 @ 04:46), Max: 36.7 (22 @ 21:07)  T(F): 97.3 (22 @ 04:46), Max: 98.1 (22 @ 21:07)  HR: 61 (22 @ 04:46) (61 - 76)  BP: 111/68 (22 @ 04:46) (111/68 - 121/69)  RR: 18 (22 @ 04:46) (18 - 18)  SpO2: 96% (22 @ 04:46) (96% - 99%)             @ 07:01  -   @ 07:00  --------------------------------------------------------  IN: 1020 mL / OUT: 1700 mL / NET: -680 mL     @ 07:01  -   @ 10:23  --------------------------------------------------------  IN: 360 mL / OUT: 0 mL / NET: 360 mL       Daily     Daily Weight in k.2 (2022 13:00)  Admit Wt: Drug Dosing Weight  Height (cm): 170.2 (2022 00:03)  Weight (kg): 68.8 (2022 00:03)  BMI (kg/m2): 23.8 (2022 00:03)  BSA (m2): 1.8 (2022 00:03)      CAPILLARY BLOOD GLUCOSE      POCT Blood Glucose.: 130 mg/dL (2022 07:43)  POCT Blood Glucose.: 129 mg/dL (2022 21:35)  POCT Blood Glucose.: 126 mg/dL (2022 16:31)  POCT Blood Glucose.: 114 mg/dL (2022 11:26)          amLODIPine   Tablet 5 milliGRAM(s) Oral daily  aspirin enteric coated 81 milliGRAM(s) Oral daily  atorvastatin 80 milliGRAM(s) Oral at bedtime  carvedilol 6.25 milliGRAM(s) Oral every 12 hours  enoxaparin Injectable 40 milliGRAM(s) SubCutaneous every 24 hours  ferrous    sulfate 325 milliGRAM(s) Oral daily  furosemide    Tablet 80 milliGRAM(s) Oral daily  insulin lispro (ADMELOG) corrective regimen sliding scale   SubCutaneous three times a day before meals  insulin lispro (ADMELOG) corrective regimen sliding scale   SubCutaneous at bedtime  isosorbide   mononitrate ER Tablet (IMDUR) 30 milliGRAM(s) Oral daily  pantoprazole    Tablet 40 milliGRAM(s) Oral before breakfast  potassium chloride    Tablet ER 20 milliEquivalent(s) Oral two times a day  sodium chloride 0.9% lock flush 3 milliLiter(s) IV Push every 8 hours  spironolactone 25 milliGRAM(s) Oral daily  tamsulosin 0.4 milliGRAM(s) Oral at bedtime      PHYSICAL EXAM    Subjective: "Hi.   Neurology: alert and oriented x 3, nonfocal, no gross deficits  CV : tele:  RSR  Sternal Wound :  CDI with dressing , Stable  Lungs: clear. RR easy, unlabored   Abdomen: soft, nontender, nondistended, positive bowel sounds, bowel movement   Neg N/V/D   :  pt voiding without difficulty   Extremities:   SHELBY; edema, neg calf tenderness.   PPP bilaterally      PW:  Chest tubes:                 VITAL SIGNS    Telemetry:  rsr 60-70  Vital Signs Last 24 Hrs  T(C): 36.3 (22 @ 04:46), Max: 36.7 (22 @ 21:07)  T(F): 97.3 (22 @ 04:46), Max: 98.1 (22 @ 21:07)  HR: 61 (22 @ 04:46) (61 - 76)  BP: 111/68 (22 @ 04:46) (111/68 - 121/69)  RR: 18 (22 @ 04:46) (18 - 18)  SpO2: 96% (22 @ 04:46) (96% - 99%)             @ 07:01  -   @ 07:00  --------------------------------------------------------  IN: 1020 mL / OUT: 1700 mL / NET: -680 mL     @ 07:01  -   @ 10:23  --------------------------------------------------------  IN: 360 mL / OUT: 0 mL / NET: 360 mL       Daily     Daily Weight in k.2 (2022 13:00)  Admit Wt: Drug Dosing Weight  Height (cm): 170.2 (2022 00:03)  Weight (kg): 68.8 (2022 00:03)  BMI (kg/m2): 23.8 (2022 00:03)  BSA (m2): 1.8 (2022 00:03)      CAPILLARY BLOOD GLUCOSE      POCT Blood Glucose.: 130 mg/dL (2022 07:43)  POCT Blood Glucose.: 129 mg/dL (2022 21:35)  POCT Blood Glucose.: 126 mg/dL (2022 16:31)  POCT Blood Glucose.: 114 mg/dL (2022 11:26)          amLODIPine   Tablet 5 milliGRAM(s) Oral daily  aspirin enteric coated 81 milliGRAM(s) Oral daily  atorvastatin 80 milliGRAM(s) Oral at bedtime  carvedilol 6.25 milliGRAM(s) Oral every 12 hours  enoxaparin Injectable 40 milliGRAM(s) SubCutaneous every 24 hours  ferrous    sulfate 325 milliGRAM(s) Oral daily  furosemide    Tablet 80 milliGRAM(s) Oral daily  insulin lispro (ADMELOG) corrective regimen sliding scale   SubCutaneous three times a day before meals  insulin lispro (ADMELOG) corrective regimen sliding scale   SubCutaneous at bedtime  isosorbide   mononitrate ER Tablet (IMDUR) 30 milliGRAM(s) Oral daily  pantoprazole    Tablet 40 milliGRAM(s) Oral before breakfast  potassium chloride    Tablet ER 20 milliEquivalent(s) Oral two times a day  sodium chloride 0.9% lock flush 3 milliLiter(s) IV Push every 8 hours  spironolactone 25 milliGRAM(s) Oral daily  tamsulosin 0.4 milliGRAM(s) Oral at bedtime         PHYSICAL EXAM    Subjective: "My surgery is Thursday."   Neurology: alert and oriented x 3, nonfocal, no gross deficits  CV : tele: rsr 60-70  Lungs: clear. RR easy, unlabored   Abdomen: soft, nontender, nondistended, positive bowel sounds, + bowel movement; Neg N/V/D   :  pt voiding without difficulty   Extremities:   SHELBY; neg Le edema, neg calf tenderness.   PPP bilaterally; rt groin cath site cdi melanie- neg bleeding/ hematoma

## 2022-06-20 NOTE — PROGRESS NOTE ADULT - SUBJECTIVE AND OBJECTIVE BOX
DATE OF SERVICE: 06-20-22 @ 14:35    Patient is a 81y old  Male who presents with a chief complaint of CAD, left main disease (20 Jun 2022 10:22)      INTERVAL HISTORY: Feels ok.     REVIEW OF SYSTEMS:  CONSTITUTIONAL: No weakness  EYES/ENT: No visual changes;  No throat pain   NECK: No pain or stiffness  RESPIRATORY: No cough, wheezing; No shortness of breath  CARDIOVASCULAR: No chest pain or palpitations  GASTROINTESTINAL: No abdominal  pain. No nausea, vomiting, or hematemesis  GENITOURINARY: No dysuria, frequency or hematuria  NEUROLOGICAL: No stroke like symptoms  SKIN: No rashes    TELEMETRY Personally reviewed: SB/SR 50-80s  	  MEDICATIONS:  amLODIPine   Tablet 5 milliGRAM(s) Oral daily  carvedilol 6.25 milliGRAM(s) Oral every 12 hours  furosemide    Tablet 80 milliGRAM(s) Oral daily  isosorbide   mononitrate ER Tablet (IMDUR) 30 milliGRAM(s) Oral daily  spironolactone 25 milliGRAM(s) Oral daily  tamsulosin 0.4 milliGRAM(s) Oral at bedtime        PHYSICAL EXAM:  T(C): 36.2 (06-20-22 @ 13:43), Max: 36.7 (06-19-22 @ 21:07)  HR: 73 (06-20-22 @ 13:43) (61 - 76)  BP: 116/83 (06-20-22 @ 13:43) (111/68 - 121/69)  RR: 18 (06-20-22 @ 13:43) (18 - 18)  SpO2: 99% (06-20-22 @ 13:43) (96% - 99%)  Wt(kg): --  I&O's Summary    19 Jun 2022 07:01  -  20 Jun 2022 07:00  --------------------------------------------------------  IN: 1020 mL / OUT: 1700 mL / NET: -680 mL    20 Jun 2022 07:01  -  20 Jun 2022 14:35  --------------------------------------------------------  IN: 540 mL / OUT: 600 mL / NET: -60 mL          Appearance: In no distress	  HEENT:    PERRL, EOMI	  Cardiovascular:  S1 S2, No JVD  Respiratory: Lungs clear to auscultation	  Gastrointestinal:  Soft, Non-tender, + BS	  Vascularature:  No edema of LE  Psychiatric: Appropriate affect   Neuro: no acute focal deficits                               13.3   7.90  )-----------( 211      ( 20 Jun 2022 06:54 )             41.8     06-20    138  |  103  |  26<H>  ----------------------------<  117<H>  4.6   |  22  |  0.87    Ca    10.1      20 Jun 2022 06:50          Labs personally reviewed      ASSESSMENT/PLAN: 	  80 yo male with cad s/p stent x2 on plavix, ischemic cardiomyopathy w/ mild LV dysfunction, HLD, CHF, KIMMY, paroxysmal afib (not on ac due to bleed in the past?), HTN, DM2 presents to the ER with acute R sided chest pressure starting around 11AM today. Admitted for further evaluation due to elevated cardiac enzymes.    Pt s/p cardiac catherization with evidence of CAD with left main disease. PT transferred to [address] for surgical evaluation with Dr. Perez. Labs and imaging to be reviewed with Dr. Perez.     Problem/Recommendation - 1:  ·  Problem; CAD   - EKG noted   - s/p LHC showing CAD of left main   - TTE shows EF 45%  and Severe mitral regurgitation with an eccentric, anteriorly directed jet.  Mild to moderate segmental left ventricular systolic dysfunction.  Hypokinesis of the lateral, inferolateral, and basal inferior walls  - For OR with Dr. Perez next week   - c/w ASA 81, Atorvastatin 80, Coreg 6.25 BID and  Imdur 30mg QD     Problem/Recommendation - 2:  ·  Problem; :Systolic HF (heart failure).   - TTE as noted above   - c/w lasix   - monitor electrolytes and replete as needed   -  Strict I&Os  - on Imdur, Metoprolol, coreg, spironolactone  - Monitor BP       Problem/Recommendation - 3:  ·  Problem; HTN   - c/w meds as noted above   - starting on Norvasc   - monitor BP       Problem/Recommendation - 4:  ·  Problem: DM   - ISS       Problem/Recommendation - 5:  ·  Problem: HLD   - c/w statin             GERALD Ziegler DO [facility]  Cardiovascular Medicine  [address] [place] [address], [address]  Office: [phone]  Cell: [phone] DATE OF SERVICE: 06-20-22 @ 14:35    Patient is a 81y old  Male who presents with a chief complaint of CAD, left main disease (20 Jun 2022 10:22)      INTERVAL HISTORY: Feels ok.     REVIEW OF SYSTEMS:  CONSTITUTIONAL: No weakness  EYES/ENT: No visual changes;  No throat pain   NECK: No pain or stiffness  RESPIRATORY: No cough, wheezing; No shortness of breath  CARDIOVASCULAR: No chest pain or palpitations  GASTROINTESTINAL: No abdominal  pain. No nausea, vomiting, or hematemesis  GENITOURINARY: No dysuria, frequency or hematuria  NEUROLOGICAL: No stroke like symptoms  SKIN: No rashes    TELEMETRY Personally reviewed: SB/SR 50-80s  	  MEDICATIONS:  amLODIPine   Tablet 5 milliGRAM(s) Oral daily  carvedilol 6.25 milliGRAM(s) Oral every 12 hours  furosemide    Tablet 80 milliGRAM(s) Oral daily  isosorbide   mononitrate ER Tablet (IMDUR) 30 milliGRAM(s) Oral daily  spironolactone 25 milliGRAM(s) Oral daily  tamsulosin 0.4 milliGRAM(s) Oral at bedtime        PHYSICAL EXAM:  T(C): 36.2 (06-20-22 @ 13:43), Max: 36.7 (06-19-22 @ 21:07)  HR: 73 (06-20-22 @ 13:43) (61 - 76)  BP: 116/83 (06-20-22 @ 13:43) (111/68 - 121/69)  RR: 18 (06-20-22 @ 13:43) (18 - 18)  SpO2: 99% (06-20-22 @ 13:43) (96% - 99%)  Wt(kg): --  I&O's Summary    19 Jun 2022 07:01  -  20 Jun 2022 07:00  --------------------------------------------------------  IN: 1020 mL / OUT: 1700 mL / NET: -680 mL    20 Jun 2022 07:01  -  20 Jun 2022 14:35  --------------------------------------------------------  IN: 540 mL / OUT: 600 mL / NET: -60 mL          Appearance: In no distress	  HEENT:    PERRL, EOMI	  Cardiovascular:  S1 S2, No JVD  Respiratory: Lungs clear to auscultation	  Gastrointestinal:  Soft, Non-tender, + BS	  Vascularature:  No edema of LE  Psychiatric: Appropriate affect   Neuro: no acute focal deficits                               13.3   7.90  )-----------( 211      ( 20 Jun 2022 06:54 )             41.8     06-20    138  |  103  |  26<H>  ----------------------------<  117<H>  4.6   |  22  |  0.87    Ca    10.1      20 Jun 2022 06:50          Labs personally reviewed      ASSESSMENT/PLAN: 	  82 yo male with cad s/p stent x2 on plavix, ischemic cardiomyopathy w/ mild LV dysfunction, HLD, CHF, KIMMY, paroxysmal afib (not on ac due to bleed in the past?), HTN, DM2 presents to the ER with acute R sided chest pressure starting around 11AM today. Admitted for further evaluation due to elevated cardiac enzymes.    Pt s/p cardiac catherization with evidence of CAD with left main disease. PT transferred to [address] for surgical evaluation with Dr. Perez. Labs and imaging to be reviewed with Dr. Perez.     Problem/Recommendation - 1:  ·  Problem; CAD   - EKG noted   - s/p LHC showing CAD of left main   - TTE shows EF 45%  and Severe mitral regurgitation with an eccentric, anteriorly directed jet.  Mild to moderate segmental left ventricular systolic dysfunction.  Hypokinesis of the lateral, inferolateral, and basal inferior walls  - For OR with Dr. Perez next week   - c/w ASA 81, Atorvastatin 80, Coreg 6.25 BID and  Imdur 30mg QD     Problem/Recommendation - 2:  ·  Problem; :Systolic HF (heart failure).   - TTE as noted above   - c/w lasix   - monitor electrolytes and replete as needed   -  Strict I&Os  - on Imdur, Metoprolol, coreg, spironolactone  - Monitor BP       Problem/Recommendation - 3:  ·  Problem; HTN   - c/w meds as noted above   - starting on Norvasc   - monitor BP   - Lisinopril on hold      Problem/Recommendation - 4:  ·  Problem: DM   - ISS       Problem/Recommendation - 5:  ·  Problem: HLD   - c/w statin             Beba Arias, AG-NP   Prasanna Scott DO [facility]  Cardiovascular Medicine  [address] [place] [address], [address]  Office: [phone]  Cell: [phone] DATE OF SERVICE: 06-20-22 @ 14:35    Patient is a 81y old  Male who presents with a chief complaint of CAD, left main disease (20 Jun 2022 10:22)      INTERVAL HISTORY: Feels ok.     REVIEW OF SYSTEMS:  CONSTITUTIONAL: No weakness  EYES/ENT: No visual changes;  No throat pain   NECK: No pain or stiffness  RESPIRATORY: No cough, wheezing; No shortness of breath  CARDIOVASCULAR: No chest pain or palpitations  GASTROINTESTINAL: No abdominal  pain. No nausea, vomiting, or hematemesis  GENITOURINARY: No dysuria, frequency or hematuria  NEUROLOGICAL: No stroke like symptoms  SKIN: No rashes    TELEMETRY Personally reviewed: SB/SR 50-80s  	  MEDICATIONS:  amLODIPine   Tablet 5 milliGRAM(s) Oral daily  carvedilol 6.25 milliGRAM(s) Oral every 12 hours  furosemide    Tablet 80 milliGRAM(s) Oral daily  isosorbide   mononitrate ER Tablet (IMDUR) 30 milliGRAM(s) Oral daily  spironolactone 25 milliGRAM(s) Oral daily  tamsulosin 0.4 milliGRAM(s) Oral at bedtime        PHYSICAL EXAM:  T(C): 36.2 (06-20-22 @ 13:43), Max: 36.7 (06-19-22 @ 21:07)  HR: 73 (06-20-22 @ 13:43) (61 - 76)  BP: 116/83 (06-20-22 @ 13:43) (111/68 - 121/69)  RR: 18 (06-20-22 @ 13:43) (18 - 18)  SpO2: 99% (06-20-22 @ 13:43) (96% - 99%)  Wt(kg): --  I&O's Summary    19 Jun 2022 07:01  -  20 Jun 2022 07:00  --------------------------------------------------------  IN: 1020 mL / OUT: 1700 mL / NET: -680 mL    20 Jun 2022 07:01  -  20 Jun 2022 14:35  --------------------------------------------------------  IN: 540 mL / OUT: 600 mL / NET: -60 mL          Appearance: In no distress	  HEENT:    PERRL, EOMI	  Cardiovascular:  S1 S2, No JVD  Respiratory: Lungs clear to auscultation	  Gastrointestinal:  Soft, Non-tender, + BS	  Vascularature:  No edema of LE  Psychiatric: Appropriate affect   Neuro: no acute focal deficits                               13.3   7.90  )-----------( 211      ( 20 Jun 2022 06:54 )             41.8     06-20    138  |  103  |  26<H>  ----------------------------<  117<H>  4.6   |  22  |  0.87    Ca    10.1      20 Jun 2022 06:50          Labs personally reviewed      ASSESSMENT/PLAN: 	  82 yo male with cad s/p stent x2 on plavix, ischemic cardiomyopathy w/ mild LV dysfunction, HLD, CHF, KIMMY, paroxysmal afib (not on ac due to bleed in the past?), HTN, DM2 presents to the ER with acute R sided chest pressure starting around 11AM today. Admitted for further evaluation due to elevated cardiac enzymes.    Pt s/p cardiac catherization with evidence of CAD with left main disease. PT transferred to [address] for surgical evaluation with Dr. Perez. Labs and imaging to be reviewed with Dr. Perez.     Problem/Recommendation - 1:  ·  Problem; CAD   - EKG noted   - s/p LHC showing CAD of left main   - TTE shows EF 45%  and Severe mitral regurgitation with an eccentric, anteriorly directed jet.  Mild to moderate segmental left ventricular systolic dysfunction.  Hypokinesis of the lateral, inferolateral, and basal inferior walls  - For OR with Dr. Perez Thursday  - c/w ASA 81, Atorvastatin 80, Coreg 6.25 BID and  Imdur 30mg QD     Problem/Recommendation - 2:  ·  Problem; :Systolic HF (heart failure).   - TTE as noted above   - c/w lasix   - monitor electrolytes and replete as needed   -  Strict I&Os  - on Imdur, Metoprolol, coreg, spironolactone  - Monitor BP       Problem/Recommendation - 3:  ·  Problem; HTN   - c/w meds as noted above   - starting on Norvasc   - monitor BP   - Lisinopril on hold      Problem/Recommendation - 4:  ·  Problem: DM   - ISS       Problem/Recommendation - 5:  ·  Problem: HLD   - c/w statin             Beba Arias, AG-NP   Prasanna Scott DO [facility]  Cardiovascular Medicine  [address] [place] [address], [address]  Office: [phone]  Cell: [phone]

## 2022-06-20 NOTE — PROGRESS NOTE ADULT - ASSESSMENT
80 yo male with cad s/p stent x2 on plavix, ischemic cardiomyopathy w/ mild LV dysfunction, HLD, CHF, KIMMY, paroxysmal afib (not on ac due to bleed in the past?), HTN, DM2 presents to the ER with acute R sided chest pressure starting around 11AM today. Admitted for further evaluation due to elevated cardiac enzymes.  Pt s/p cardiac catherization with evidence of CAD with left main disease. PT transferred to [facility] for surgical evaluation with Dr. Perez. Labs and imaging to be reviewed with Dr. Perez.      6/17: transferred to [facility]; acc junct/ rsr 50-60; preop w/u in progress, f/u P2Y12 123 today;  ck echo: mod MR; mild LV systolic dysfunction   continue asa/ statin and bb; add dvt prophylaxis   OR next thur with Dr. Perez   6/19  [facility]    p2y12    133 82 yo male with cad s/p stent x2 on plavix, ischemic cardiomyopathy w/ mild LV dysfunction, HLD, CHF, KIMMY, paroxysmal afib (not on ac due to bleed in the past?), HTN, DM2 presents to the ER with acute R sided chest pressure starting around 11AM today. Admitted for further evaluation due to elevated cardiac enzymes.  Pt s/p cardiac catherization with evidence of CAD with left main disease. PT transferred to [facility] for surgical evaluation with Dr. Perez. Labs and imaging to be reviewed with Dr. Perez.      6/17: transferred to [facility]; acc junct/ rsr 50-60; preop w/u in progress, f/u P2Y12 123 today;  ck echo: mod MR; mild LV systolic dysfunction   continue asa/ statin and bb; add dvt prophylaxis   OR next thur with Dr. Perez   6/19  [facility]    p2y12    133  6/20 VSS: pt stable at this time; repeat p2y12 in am; OR planned for thur 6/23 with Dr. Perez

## 2022-06-21 LAB
ANION GAP SERPL CALC-SCNC: 9 MMOL/L — SIGNIFICANT CHANGE UP (ref 5–17)
BUN SERPL-MCNC: 23 MG/DL — SIGNIFICANT CHANGE UP (ref 7–23)
CALCIUM SERPL-MCNC: 10 MG/DL — SIGNIFICANT CHANGE UP (ref 8.4–10.5)
CHLORIDE SERPL-SCNC: 101 MMOL/L — SIGNIFICANT CHANGE UP (ref 96–108)
CO2 SERPL-SCNC: 26 MMOL/L — SIGNIFICANT CHANGE UP (ref 22–31)
CREAT SERPL-MCNC: 0.99 MG/DL — SIGNIFICANT CHANGE UP (ref 0.5–1.3)
EGFR: 77 ML/MIN/1.73M2 — SIGNIFICANT CHANGE UP
GLUCOSE BLDC GLUCOMTR-MCNC: 117 MG/DL — HIGH (ref 70–99)
GLUCOSE BLDC GLUCOMTR-MCNC: 118 MG/DL — HIGH (ref 70–99)
GLUCOSE BLDC GLUCOMTR-MCNC: 120 MG/DL — HIGH (ref 70–99)
GLUCOSE BLDC GLUCOMTR-MCNC: 122 MG/DL — HIGH (ref 70–99)
GLUCOSE SERPL-MCNC: 133 MG/DL — HIGH (ref 70–99)
HCT VFR BLD CALC: 42.6 % — SIGNIFICANT CHANGE UP (ref 39–50)
HGB BLD-MCNC: 13.5 G/DL — SIGNIFICANT CHANGE UP (ref 13–17)
MCHC RBC-ENTMCNC: 26.5 PG — LOW (ref 27–34)
MCHC RBC-ENTMCNC: 31.7 GM/DL — LOW (ref 32–36)
MCV RBC AUTO: 83.7 FL — SIGNIFICANT CHANGE UP (ref 80–100)
NRBC # BLD: 0 /100 WBCS — SIGNIFICANT CHANGE UP (ref 0–0)
PA ADP PRP-ACNC: 152 PRU — LOW (ref 194–417)
PLATELET # BLD AUTO: 211 K/UL — SIGNIFICANT CHANGE UP (ref 150–400)
POTASSIUM SERPL-MCNC: 5.2 MMOL/L — SIGNIFICANT CHANGE UP (ref 3.5–5.3)
POTASSIUM SERPL-SCNC: 5.2 MMOL/L — SIGNIFICANT CHANGE UP (ref 3.5–5.3)
RBC # BLD: 5.09 M/UL — SIGNIFICANT CHANGE UP (ref 4.2–5.8)
RBC # FLD: 15.8 % — HIGH (ref 10.3–14.5)
SARS-COV-2 RNA SPEC QL NAA+PROBE: SIGNIFICANT CHANGE UP
SODIUM SERPL-SCNC: 136 MMOL/L — SIGNIFICANT CHANGE UP (ref 135–145)
WBC # BLD: 7.51 K/UL — SIGNIFICANT CHANGE UP (ref 3.8–10.5)
WBC # FLD AUTO: 7.51 K/UL — SIGNIFICANT CHANGE UP (ref 3.8–10.5)

## 2022-06-21 PROCEDURE — 99232 SBSQ HOSP IP/OBS MODERATE 35: CPT

## 2022-06-21 RX ADMIN — SPIRONOLACTONE 25 MILLIGRAM(S): 25 TABLET, FILM COATED ORAL at 05:26

## 2022-06-21 RX ADMIN — Medication 81 MILLIGRAM(S): at 11:52

## 2022-06-21 RX ADMIN — CARVEDILOL PHOSPHATE 6.25 MILLIGRAM(S): 80 CAPSULE, EXTENDED RELEASE ORAL at 17:01

## 2022-06-21 RX ADMIN — CARVEDILOL PHOSPHATE 6.25 MILLIGRAM(S): 80 CAPSULE, EXTENDED RELEASE ORAL at 05:26

## 2022-06-21 RX ADMIN — Medication 20 MILLIEQUIVALENT(S): at 05:26

## 2022-06-21 RX ADMIN — SODIUM CHLORIDE 3 MILLILITER(S): 9 INJECTION INTRAMUSCULAR; INTRAVENOUS; SUBCUTANEOUS at 05:25

## 2022-06-21 RX ADMIN — TAMSULOSIN HYDROCHLORIDE 0.4 MILLIGRAM(S): 0.4 CAPSULE ORAL at 21:29

## 2022-06-21 RX ADMIN — Medication 325 MILLIGRAM(S): at 11:52

## 2022-06-21 RX ADMIN — SODIUM CHLORIDE 3 MILLILITER(S): 9 INJECTION INTRAMUSCULAR; INTRAVENOUS; SUBCUTANEOUS at 14:10

## 2022-06-21 RX ADMIN — ENOXAPARIN SODIUM 40 MILLIGRAM(S): 100 INJECTION SUBCUTANEOUS at 11:52

## 2022-06-21 RX ADMIN — AMLODIPINE BESYLATE 5 MILLIGRAM(S): 2.5 TABLET ORAL at 05:26

## 2022-06-21 RX ADMIN — PANTOPRAZOLE SODIUM 40 MILLIGRAM(S): 20 TABLET, DELAYED RELEASE ORAL at 05:26

## 2022-06-21 RX ADMIN — ATORVASTATIN CALCIUM 80 MILLIGRAM(S): 80 TABLET, FILM COATED ORAL at 21:30

## 2022-06-21 RX ADMIN — ISOSORBIDE MONONITRATE 30 MILLIGRAM(S): 60 TABLET, EXTENDED RELEASE ORAL at 11:53

## 2022-06-21 RX ADMIN — SODIUM CHLORIDE 3 MILLILITER(S): 9 INJECTION INTRAMUSCULAR; INTRAVENOUS; SUBCUTANEOUS at 21:34

## 2022-06-21 RX ADMIN — Medication 80 MILLIGRAM(S): at 05:26

## 2022-06-21 NOTE — PROGRESS NOTE ADULT - SUBJECTIVE AND OBJECTIVE BOX
Date of Service   06-21-22 @ 12:00    Patient is a 81y old  Male who presents with a chief complaint of CAD, left main disease (21 Jun 2022 08:45)      INTERVAL HISTORY: pt feels ok   TELEMETRY Personally reviewed: SB-SR 50-70's    REVIEW OF SYSTEMS:   CONSTITUTIONAL: No weakness  EYES/ENT: No visual changes; No throat pain  Neck: No pain or stiffness  Respiratory: No cough, wheezing, No shortness of breath  CARDIOVASCULAR: no chest pain or palpitations  GASTROINTESTINAL: No abdominal pain, no nausea, vomiting or hematemesis  GENITOURINARY: No dysuria, frequency or hematuria  NEUROLOGICAL: No stroke like symptoms  SKIN: No rashes    	  MEDICATIONS:  amLODIPine   Tablet 5 milliGRAM(s) Oral daily  carvedilol 6.25 milliGRAM(s) Oral every 12 hours  furosemide    Tablet 80 milliGRAM(s) Oral daily  isosorbide   mononitrate ER Tablet (IMDUR) 30 milliGRAM(s) Oral daily  spironolactone 25 milliGRAM(s) Oral daily  tamsulosin 0.4 milliGRAM(s) Oral at bedtime        PHYSICAL EXAM:  T(C): 36.9 (06-21-22 @ 05:18), Max: 37 (06-20-22 @ 19:02)  HR: 65 (06-21-22 @ 05:18) (65 - 73)  BP: 133/81 (06-21-22 @ 05:18) (116/83 - 133/81)  RR: 18 (06-21-22 @ 05:18) (18 - 18)  SpO2: 99% (06-21-22 @ 05:18) (95% - 99%)  Wt(kg): --  I&O's Summary    20 Jun 2022 07:01  -  21 Jun 2022 07:00  --------------------------------------------------------  IN: 960 mL / OUT: 1350 mL / NET: -390 mL    21 Jun 2022 07:01  -  21 Jun 2022 12:00  --------------------------------------------------------  IN: 360 mL / OUT: 575 mL / NET: -215 mL          Appearance: In no distress	  HEENT:    PERRL, EOMI	  Cardiovascular:  S1 S2, No JVD  Respiratory: Lungs clear to auscultation	  Gastrointestinal:  Soft, Non-tender, + BS	  Vasculature:  No edema of LE  Psychiatric: Appropriate affect   Neuro: no acute focal deficits                               13.5   7.51  )-----------( 211      ( 21 Jun 2022 06:49 )             42.6     06-21    136  |  101  |  23  ----------------------------<  133<H>  5.2   |  26  |  0.99    Ca    10.0      21 Jun 2022 06:49          Labs personally reviewed      ASSESSMENT/PLAN: 		  82 yo male with cad s/p stent x2 on plavix, ischemic cardiomyopathy w/ mild LV dysfunction, HLD, CHF, KIMMY, paroxysmal afib (not on ac due to bleed in the past?), HTN, DM2 presents to the ER with acute R sided chest pressure starting around 11AM today. Admitted for further evaluation due to elevated cardiac enzymes.    Pt s/p cardiac catherization with evidence of CAD with left main disease. PT transferred to [address] for surgical evaluation with Dr. Perez. Labs and imaging to be reviewed with Dr. Perez.     Problem/Recommendation - 1:  ·  Problem; CAD   - EKG noted   - s/p LHC showing CAD of left main   - TTE shows EF 45%  and Severe mitral regurgitation with an eccentric, anteriorly directed jet.  Mild to moderate segmental left ventricular systolic dysfunction.  Hypokinesis of the lateral, inferolateral, and basal inferior walls  - For OR with Dr. Perez Thursday  - c/w ASA 81, Atorvastatin 80, Coreg 6.25 BID and  Imdur 30mg QD     Problem/Recommendation - 2:  ·  Problem; :Systolic HF (heart failure).   - TTE as noted above   - c/w lasix   - monitor electrolytes and replete as needed   -  Strict I&Os  - on Imdur, Metoprolol, coreg, spironolactone  - Monitor BP       Problem/Recommendation - 3:  ·  Problem; HTN   - c/w meds as noted above   - starting on Norvasc   - monitor BP   - Lisinopril on hold      Problem/Recommendation - 4:  ·  Problem: DM   - ISS       Problem/Recommendation - 5:  ·  Problem: HLD   - c/w statin           Imani PRINGLE-BC   Prasanna Scott DO [facility]  Cardiovascular Medicine  [address] [place] [address], [address]  Office: [phone]

## 2022-06-21 NOTE — PROGRESS NOTE ADULT - PROBLEM SELECTOR PLAN 1
Cath Report Pre-Ordonez: left main disease  c/w ASA 81, Atorvastatin 80, Coreg 6.25 BID  c/w Imdur 30mg QD  f/u P2Y12 133 6/19- repeat level in am 6/21  OR planned for thur with Dr. Perez Cath Report Pre-Ordonez: left main disease  c/w ASA 81, Atorvastatin 80, Coreg 6.25 BID  c/w Imdur 30mg QD  repeat p2y12 this am 152  OR planned for thur with Dr. Perez

## 2022-06-21 NOTE — PROGRESS NOTE ADULT - ASSESSMENT
80 yo male with cad s/p stent x2 on plavix, ischemic cardiomyopathy w/ mild LV dysfunction, HLD, CHF, KIMMY, paroxysmal afib (not on ac due to bleed in the past?), HTN, DM2 presents to the ER with acute R sided chest pressure starting around 11AM today. Admitted for further evaluation due to elevated cardiac enzymes.  Pt s/p cardiac catherization with evidence of CAD with left main disease. PT transferred to [facility] for surgical evaluation with Dr. Perez. Labs and imaging to be reviewed with Dr. Perez.      6/17: transferred to [facility]; acc junct/ rsr 50-60; preop w/u in progress, f/u P2Y12 123 today;  ck echo: mod MR; mild LV systolic dysfunction   continue asa/ statin and bb; add dvt prophylaxis   OR next thur with Dr. Perez   6/19  vss    p2y12    133  6/20 VSS: pt stable at this time; repeat p2y12 in am; OR planned for thur 6/23 with Dr. Perez  82 yo male with cad s/p stent x2 on plavix, ischemic cardiomyopathy w/ mild LV dysfunction, HLD, CHF, KIMMY, paroxysmal afib (not on ac due to bleed in the past?), HTN, DM2 presents to the ER with acute R sided chest pressure starting around 11AM today. Admitted for further evaluation due to elevated cardiac enzymes.  Pt s/p cardiac catherization with evidence of CAD with left main disease. PT transferred to [facility] for surgical evaluation with Dr. Perez. Labs and imaging to be reviewed with Dr. Perez.      6/17: transferred to  [facility]; acc junct/ rsr 50-60; preop w/u in progress, f/u P2Y12 123 today;  ck echo: mod MR; mild LV systolic dysfunction   continue asa/ statin and bb; add dvt prophylaxis   OR next thur with Dr. Perez   6/19  vss    p2y12    133  6/20 VSS: pt stable at this time; repeat p2y12 in am; OR planned for thur 6/23 with Dr. Perez   6/21 VSS; RSR 50-60; repeat p2y12 this am 152; OR planned for thur 6/24 with Dr. Perez

## 2022-06-21 NOTE — PROGRESS NOTE ADULT - SUBJECTIVE AND OBJECTIVE BOX
VITAL SIGNS    Telemetry:    Vital Signs Last 24 Hrs  T(C): 36.9 (22 @ 05:18), Max: 37 (22 @ 19:02)  T(F): 98.4 (22 @ 05:18), Max: 98.6 (22 @ 19:02)  HR: 65 (22 @ 05:18) (65 - 73)  BP: 133/81 (22 @ 05:18) (116/83 - 133/81)  RR: 18 (22 @ 05:18) (18 - 18)  SpO2: 99% (22 @ 05:18) (95% - 99%)             @ 07:01  -   @ 07:00  --------------------------------------------------------  IN: 960 mL / OUT: 1350 mL / NET: -390 mL       Daily     Daily Weight in k.5 (2022 05:18)  Admit Wt: Drug Dosing Weight  Height (cm): 170.2 (2022 00:03)  Weight (kg): 68.8 (2022 00:03)  BMI (kg/m2): 23.8 (2022 00:03)  BSA (m2): 1.8 (2022 00:03)      CAPILLARY BLOOD GLUCOSE      POCT Blood Glucose.: 122 mg/dL (2022 07:15)  POCT Blood Glucose.: 166 mg/dL (2022 21:33)  POCT Blood Glucose.: 157 mg/dL (2022 16:21)  POCT Blood Glucose.: 154 mg/dL (2022 11:31)          amLODIPine   Tablet 5 milliGRAM(s) Oral daily  aspirin enteric coated 81 milliGRAM(s) Oral daily  atorvastatin 80 milliGRAM(s) Oral at bedtime  carvedilol 6.25 milliGRAM(s) Oral every 12 hours  enoxaparin Injectable 40 milliGRAM(s) SubCutaneous every 24 hours  ferrous    sulfate 325 milliGRAM(s) Oral daily  furosemide    Tablet 80 milliGRAM(s) Oral daily  insulin lispro (ADMELOG) corrective regimen sliding scale   SubCutaneous three times a day before meals  insulin lispro (ADMELOG) corrective regimen sliding scale   SubCutaneous at bedtime  isosorbide   mononitrate ER Tablet (IMDUR) 30 milliGRAM(s) Oral daily  pantoprazole    Tablet 40 milliGRAM(s) Oral before breakfast  potassium chloride    Tablet ER 20 milliEquivalent(s) Oral two times a day  sodium chloride 0.9% lock flush 3 milliLiter(s) IV Push every 8 hours  spironolactone 25 milliGRAM(s) Oral daily  tamsulosin 0.4 milliGRAM(s) Oral at bedtime      PHYSICAL EXAM    Subjective: "Hi.   Neurology: alert and oriented x 3, nonfocal, no gross deficits  CV : tele:  RSR  Sternal Wound :  CDI with dressing , Stable  Lungs: clear. RR easy, unlabored   Abdomen: soft, nontender, nondistended, positive bowel sounds, bowel movement   Neg N/V/D   :  pt voiding without difficulty   Extremities:   SHELBY; edema, neg calf tenderness.   PPP bilaterally      PW:  Chest tubes:                 VITAL SIGNS    Telemetry:  rsr 50-60  Vital Signs Last 24 Hrs  T(C): 36.9 (22 @ 05:18), Max: 37 (22 @ 19:02)  T(F): 98.4 (22 @ 05:18), Max: 98.6 (22 @ 19:02)  HR: 65 (22 @ 05:18) (65 - 73)  BP: 133/81 (22 @ 05:18) (116/83 - 133/81)  RR: 18 (22 @ 05:18) (18 - 18)  SpO2: 99% (22 @ 05:18) (95% - 99%)             @ 07:01  -   @ 07:00  --------------------------------------------------------  IN: 960 mL / OUT: 1350 mL / NET: -390 mL       Daily     Daily Weight in k.5 (2022 05:18)  Admit Wt: Drug Dosing Weight  Height (cm): 170.2 (2022 00:03)  Weight (kg): 68.8 (2022 00:03)  BMI (kg/m2): 23.8 (2022 00:03)  BSA (m2): 1.8 (2022 00:03)      CAPILLARY BLOOD GLUCOSE      POCT Blood Glucose.: 122 mg/dL (2022 07:15)  POCT Blood Glucose.: 166 mg/dL (2022 21:33)  POCT Blood Glucose.: 157 mg/dL (2022 16:21)  POCT Blood Glucose.: 154 mg/dL (2022 11:31)          amLODIPine   Tablet 5 milliGRAM(s) Oral daily  aspirin enteric coated 81 milliGRAM(s) Oral daily  atorvastatin 80 milliGRAM(s) Oral at bedtime  carvedilol 6.25 milliGRAM(s) Oral every 12 hours  enoxaparin Injectable 40 milliGRAM(s) SubCutaneous every 24 hours  ferrous    sulfate 325 milliGRAM(s) Oral daily  furosemide    Tablet 80 milliGRAM(s) Oral daily  insulin lispro (ADMELOG) corrective regimen sliding scale   SubCutaneous three times a day before meals  insulin lispro (ADMELOG) corrective regimen sliding scale   SubCutaneous at bedtime  isosorbide   mononitrate ER Tablet (IMDUR) 30 milliGRAM(s) Oral daily  pantoprazole    Tablet 40 milliGRAM(s) Oral before breakfast  potassium chloride    Tablet ER 20 milliEquivalent(s) Oral two times a day  sodium chloride 0.9% lock flush 3 milliLiter(s) IV Push every 8 hours  spironolactone 25 milliGRAM(s) Oral daily  tamsulosin 0.4 milliGRAM(s) Oral at bedtime        PHYSICAL EXAM    Subjective: "My surgery is Thursday."   Neurology: alert and oriented x 3, nonfocal, no gross deficits  CV : tele: rsr 50-60  Lungs: clear. RR easy, unlabored   Abdomen: soft, nontender, nondistended, positive bowel sounds, + bowel movement; Neg N/V/D   :  pt voiding without difficulty   Extremities:   SHELBY; neg Le edema, neg calf tenderness.   PPP bilaterally; rt groin cath site cdi melanie- neg bleeding/ hematoma

## 2022-06-22 LAB
ANION GAP SERPL CALC-SCNC: 15 MMOL/L — SIGNIFICANT CHANGE UP (ref 5–17)
BLD GP AB SCN SERPL QL: NEGATIVE — SIGNIFICANT CHANGE UP
BUN SERPL-MCNC: 26 MG/DL — HIGH (ref 7–23)
CALCIUM SERPL-MCNC: 10.1 MG/DL — SIGNIFICANT CHANGE UP (ref 8.4–10.5)
CHLORIDE SERPL-SCNC: 100 MMOL/L — SIGNIFICANT CHANGE UP (ref 96–108)
CO2 SERPL-SCNC: 19 MMOL/L — LOW (ref 22–31)
CREAT SERPL-MCNC: 0.91 MG/DL — SIGNIFICANT CHANGE UP (ref 0.5–1.3)
EGFR: 86 ML/MIN/1.73M2 — SIGNIFICANT CHANGE UP
GLUCOSE BLDC GLUCOMTR-MCNC: 124 MG/DL — HIGH (ref 70–99)
GLUCOSE BLDC GLUCOMTR-MCNC: 127 MG/DL — HIGH (ref 70–99)
GLUCOSE BLDC GLUCOMTR-MCNC: 151 MG/DL — HIGH (ref 70–99)
GLUCOSE BLDC GLUCOMTR-MCNC: 166 MG/DL — HIGH (ref 70–99)
GLUCOSE SERPL-MCNC: 129 MG/DL — HIGH (ref 70–99)
HCT VFR BLD CALC: 39.8 % — SIGNIFICANT CHANGE UP (ref 39–50)
HGB BLD-MCNC: 12.6 G/DL — LOW (ref 13–17)
MCHC RBC-ENTMCNC: 26.2 PG — LOW (ref 27–34)
MCHC RBC-ENTMCNC: 31.7 GM/DL — LOW (ref 32–36)
MCV RBC AUTO: 82.7 FL — SIGNIFICANT CHANGE UP (ref 80–100)
NRBC # BLD: 0 /100 WBCS — SIGNIFICANT CHANGE UP (ref 0–0)
PA ADP PRP-ACNC: 138 PRU — LOW (ref 194–417)
PLATELET # BLD AUTO: 219 K/UL — SIGNIFICANT CHANGE UP (ref 150–400)
POTASSIUM SERPL-MCNC: 4.9 MMOL/L — SIGNIFICANT CHANGE UP (ref 3.5–5.3)
POTASSIUM SERPL-SCNC: 4.9 MMOL/L — SIGNIFICANT CHANGE UP (ref 3.5–5.3)
RBC # BLD: 4.81 M/UL — SIGNIFICANT CHANGE UP (ref 4.2–5.8)
RBC # FLD: 15.8 % — HIGH (ref 10.3–14.5)
RH IG SCN BLD-IMP: POSITIVE — SIGNIFICANT CHANGE UP
SODIUM SERPL-SCNC: 134 MMOL/L — LOW (ref 135–145)
WBC # BLD: 8.89 K/UL — SIGNIFICANT CHANGE UP (ref 3.8–10.5)
WBC # FLD AUTO: 8.89 K/UL — SIGNIFICANT CHANGE UP (ref 3.8–10.5)

## 2022-06-22 RX ORDER — MUPIROCIN 20 MG/G
1 OINTMENT TOPICAL
Refills: 0 | Status: DISCONTINUED | OUTPATIENT
Start: 2022-06-22 | End: 2022-06-23

## 2022-06-22 RX ORDER — ACETAMINOPHEN 500 MG
1000 TABLET ORAL ONCE
Refills: 0 | Status: COMPLETED | OUTPATIENT
Start: 2022-06-22 | End: 2022-06-23

## 2022-06-22 RX ORDER — GABAPENTIN 400 MG/1
300 CAPSULE ORAL ONCE
Refills: 0 | Status: COMPLETED | OUTPATIENT
Start: 2022-06-22 | End: 2022-06-23

## 2022-06-22 RX ORDER — CEFUROXIME AXETIL 250 MG
1500 TABLET ORAL ONCE
Refills: 0 | Status: DISCONTINUED | OUTPATIENT
Start: 2022-06-22 | End: 2022-06-23

## 2022-06-22 RX ORDER — CHLORHEXIDINE GLUCONATE 213 G/1000ML
1 SOLUTION TOPICAL ONCE
Refills: 0 | Status: COMPLETED | OUTPATIENT
Start: 2022-06-22 | End: 2022-06-22

## 2022-06-22 RX ORDER — CHLORHEXIDINE GLUCONATE 213 G/1000ML
30 SOLUTION TOPICAL ONCE
Refills: 0 | Status: COMPLETED | OUTPATIENT
Start: 2022-06-22 | End: 2022-06-23

## 2022-06-22 RX ORDER — CEFUROXIME AXETIL 250 MG
1500 TABLET ORAL ONCE
Refills: 0 | Status: DISCONTINUED | OUTPATIENT
Start: 2022-06-22 | End: 2022-06-22

## 2022-06-22 RX ORDER — ASCORBIC ACID 60 MG
2000 TABLET,CHEWABLE ORAL ONCE
Refills: 0 | Status: COMPLETED | OUTPATIENT
Start: 2022-06-22 | End: 2022-06-22

## 2022-06-22 RX ADMIN — Medication 80 MILLIGRAM(S): at 05:01

## 2022-06-22 RX ADMIN — AMLODIPINE BESYLATE 5 MILLIGRAM(S): 2.5 TABLET ORAL at 05:01

## 2022-06-22 RX ADMIN — Medication 1: at 17:06

## 2022-06-22 RX ADMIN — Medication 1: at 12:05

## 2022-06-22 RX ADMIN — CARVEDILOL PHOSPHATE 6.25 MILLIGRAM(S): 80 CAPSULE, EXTENDED RELEASE ORAL at 05:00

## 2022-06-22 RX ADMIN — Medication 81 MILLIGRAM(S): at 11:51

## 2022-06-22 RX ADMIN — ISOSORBIDE MONONITRATE 30 MILLIGRAM(S): 60 TABLET, EXTENDED RELEASE ORAL at 11:50

## 2022-06-22 RX ADMIN — SODIUM CHLORIDE 3 MILLILITER(S): 9 INJECTION INTRAMUSCULAR; INTRAVENOUS; SUBCUTANEOUS at 14:24

## 2022-06-22 RX ADMIN — CHLORHEXIDINE GLUCONATE 1 APPLICATION(S): 213 SOLUTION TOPICAL at 18:49

## 2022-06-22 RX ADMIN — Medication 325 MILLIGRAM(S): at 11:50

## 2022-06-22 RX ADMIN — CARVEDILOL PHOSPHATE 6.25 MILLIGRAM(S): 80 CAPSULE, EXTENDED RELEASE ORAL at 17:08

## 2022-06-22 RX ADMIN — MUPIROCIN 1 APPLICATION(S): 20 OINTMENT TOPICAL at 17:08

## 2022-06-22 RX ADMIN — SODIUM CHLORIDE 3 MILLILITER(S): 9 INJECTION INTRAMUSCULAR; INTRAVENOUS; SUBCUTANEOUS at 05:20

## 2022-06-22 RX ADMIN — ATORVASTATIN CALCIUM 80 MILLIGRAM(S): 80 TABLET, FILM COATED ORAL at 21:41

## 2022-06-22 RX ADMIN — TAMSULOSIN HYDROCHLORIDE 0.4 MILLIGRAM(S): 0.4 CAPSULE ORAL at 21:41

## 2022-06-22 RX ADMIN — PANTOPRAZOLE SODIUM 40 MILLIGRAM(S): 20 TABLET, DELAYED RELEASE ORAL at 05:01

## 2022-06-22 RX ADMIN — SODIUM CHLORIDE 3 MILLILITER(S): 9 INJECTION INTRAMUSCULAR; INTRAVENOUS; SUBCUTANEOUS at 21:45

## 2022-06-22 RX ADMIN — Medication 2000 MILLIGRAM(S): at 21:41

## 2022-06-22 NOTE — PROGRESS NOTE ADULT - SUBJECTIVE AND OBJECTIVE BOX
DATE OF SERVICE: 06-22-22 @ 09:15    Patient is a 81y old  Male who presents with a chief complaint of CAD, left main disease (22 Jun 2022 08:50)      INTERVAL HISTORY: Feels ok.     REVIEW OF SYSTEMS:  CONSTITUTIONAL: No weakness  EYES/ENT: No visual changes;  No throat pain   NECK: No pain or stiffness  RESPIRATORY: No cough, wheezing; No shortness of breath  CARDIOVASCULAR: No chest pain or palpitations  GASTROINTESTINAL: No abdominal  pain. No nausea, vomiting, or hematemesis  GENITOURINARY: No dysuria, frequency or hematuria  NEUROLOGICAL: No stroke like symptoms  SKIN: No rashes    TELEMETRY Personally reviewed: SB/SR parxysmal ectopic atrial rhythm 50-80s, PVCs  	  MEDICATIONS:  amLODIPine   Tablet 5 milliGRAM(s) Oral daily  carvedilol 6.25 milliGRAM(s) Oral every 12 hours  furosemide    Tablet 80 milliGRAM(s) Oral daily  isosorbide   mononitrate ER Tablet (IMDUR) 30 milliGRAM(s) Oral daily  tamsulosin 0.4 milliGRAM(s) Oral at bedtime        PHYSICAL EXAM:  T(C): 36.7 (06-22-22 @ 04:03), Max: 37.2 (06-21-22 @ 12:58)  HR: 62 (06-22-22 @ 04:03) (62 - 79)  BP: 135/79 (06-22-22 @ 04:03) (112/74 - 135/79)  RR: 18 (06-22-22 @ 04:03) (18 - 18)  SpO2: 99% (06-22-22 @ 04:03) (98% - 99%)  Wt(kg): --  I&O's Summary    21 Jun 2022 07:01  -  22 Jun 2022 07:00  --------------------------------------------------------  IN: 600 mL / OUT: 1450 mL / NET: -850 mL    22 Jun 2022 07:01  -  22 Jun 2022 09:15  --------------------------------------------------------  IN: 0 mL / OUT: 800 mL / NET: -800 mL          Appearance: In no distress	  HEENT:    PERRL, EOMI	  Cardiovascular:  S1 S2, No JVD  Respiratory: Lungs clear to auscultation	  Gastrointestinal:  Soft, Non-tender, + BS	  Vascularature:  No edema of LE  Psychiatric: Appropriate affect   Neuro: no acute focal deficits                               12.6   8.89  )-----------( 219      ( 22 Jun 2022 05:47 )             39.8     06-22    134<L>  |  100  |  26<H>  ----------------------------<  129<H>  4.9   |  19<L>  |  0.91    Ca    10.1      22 Jun 2022 05:47          Labs personally reviewed      ASSESSMENT/PLAN: 	    80 yo male with cad s/p stent x2 on plavix, ischemic cardiomyopathy w/ mild LV dysfunction, HLD, CHF, KIMMY, paroxysmal afib (not on ac due to bleed in the past?), HTN, DM2 presents to the ER with acute R sided chest pressure starting around 11AM today. Admitted for further evaluation due to elevated cardiac enzymes.    Pt s/p cardiac catherization with evidence of CAD with left main disease. PT transferred to [address] for surgical evaluation with Dr. Perez. Labs and imaging to be reviewed with Dr. Perez.     Problem/Recommendation - 1:  ·  Problem; CAD   - EKG noted   - s/p LHC showing CAD of left main   - TTE shows EF 45%  and Severe mitral regurgitation with an eccentric, anteriorly directed jet.  Mild to moderate segmental left ventricular systolic dysfunction.  Hypokinesis of the lateral, inferolateral, and basal inferior walls  - For OR with Dr. Perez Thursday  - c/w ASA 81, Atorvastatin 80, Coreg 6.25 BID and  Imdur 30mg QD     Problem/Recommendation - 2:  ·  Problem; :Systolic HF (heart failure).   - TTE as noted above   - c/w lasix   - monitor electrolytes and replete as needed   -  Strict I&Os  - on Imdur, Metoprolol, coreg, spironolactone  - Monitor BP       Problem/Recommendation - 3:  ·  Problem; HTN   - c/w meds as noted above   - starting on Norvasc   - monitor BP   - Lisinopril on hold      Problem/Recommendation - 4:  ·  Problem: DM   - ISS       Problem/Recommendation - 5:  ·  Problem: HLD   - c/w statin             Beba Arias, AG-NP   Prasanna Scott DO [facility]  Cardiovascular Medicine  [address] [place] [address], [address]  Office: [phone]  Cell: [phone]

## 2022-06-22 NOTE — PROGRESS NOTE ADULT - PROBLEM SELECTOR PLAN 1
Cath Report Pre-Ordonez: left main disease  NPO at midnight  Zinacef on call to OR  c/w ASA 81, Atorvastatin 80, Coreg 6.25 BID   Hibiclens shower tonight and am  Peridex rinse in am   ERP protocol  CABG in am with Dr. Perez

## 2022-06-22 NOTE — PROGRESS NOTE ADULT - ASSESSMENT
82 yo male with cad s/p stent x2 on plavix, ischemic cardiomyopathy w/ mild LV dysfunction, HLD, CHF, KIMMY, paroxysmal afib (not on ac due to bleed in the past?), HTN, DM2 presents to the ER with acute R sided chest pressure starting around 11AM today. Admitted for further evaluation due to elevated cardiac enzymes.  Pt s/p cardiac catherization with evidence of CAD with left main disease. PT transferred to [facility] for surgical evaluation with Dr. Perez. Labs and imaging to be reviewed with Dr. Perez.      6/17: transferred to  [facility]; acc junct/ rsr 50-60; preop w/u in progress, f/u P2Y12 123 today;  ck echo: mod MR; mild LV systolic dysfunction   continue asa/ statin and bb; add dvt prophylaxis   6/19  vss    p2y12    133  6/20 VSS: pt stable at this time; repeat p2y12 in am; OR planned for thur 6/23 with Dr. Perez   6/21 VSS; RSR 50-60; repeat p2y12 this am 152; OR planned for thur 6/24 with Dr. Perez   6/22 VSS P2Y12 138   cardiac surgery workup completed OR in am

## 2022-06-22 NOTE — PROGRESS NOTE ADULT - SUBJECTIVE AND OBJECTIVE BOX
Cardiac Surgery Pre-op Note:    CC: Patient is a 81y old  Male who presents with a chief complaint of CAD, left main disease now for CABG in am  with Dr. Perez       Referring Physician:                                                                                                           Surgeon: Dr Perez    Procedure:  6/23/22 CABG    Allergies    No Known Allergies    Intolerances        HPI:  82 yo male with cad s/p stent x2 on plavix, ischemic cardiomyopathy w/ mild LV dysfunction, HLD, CHF, KIMMY, paroxysmal afib (not on ac due to bleed in the past?), HTN, DM2 presents to the ER with acute R sided chest pressure starting around 11AM today. Admitted for further evaluation due to elevated cardiac enzymes.    Pt s/p cardiac catherization with evidence of CAD with left main disease. PT transferred to [address] for surgical evaluation with Dr. Perez. Labs and imaging to be reviewed with Dr. Perez.   (17 Jun 2022 01:25)      PAST MEDICAL & SURGICAL HISTORY:  CAD (coronary artery disease)      CHF (congestive heart failure)      HTN (hypertension)      HLD (hyperlipidemia)      DM (diabetes mellitus)      KIMMY (iron deficiency anemia)      PAF (paroxysmal atrial fibrillation)      Chronic diastolic congestive heart failure, NYHA class 3      CAD (coronary artery disease)      CHF NYHA class III      DM (diabetes mellitus)      HLD (hyperlipidemia)      HTN (hypertension)      KIMMY (iron deficiency anemia)      PAF (paroxysmal atrial fibrillation)      S/P hernia repair  Lt      S/P coronary artery stent placement  2x in 4/17 in [place]      S/P coronary artery stent placement  x2 4/17 in [place]      H/O hernia repair          MEDICATIONS  (STANDING):  acetaminophen     Tablet .. 1000 milliGRAM(s) Oral once  amLODIPine   Tablet 5 milliGRAM(s) Oral daily  ascorbic acid 2000 milliGRAM(s) Oral once  aspirin enteric coated 81 milliGRAM(s) Oral daily  atorvastatin 80 milliGRAM(s) Oral at bedtime  carvedilol 6.25 milliGRAM(s) Oral every 12 hours  cefuroxime  IVPB 1500 milliGRAM(s) IV Intermittent once  chlorhexidine 0.12% Liquid 30 milliLiter(s) Swish and Spit once  chlorhexidine 4% Liquid 1 Application(s) Topical once  enoxaparin Injectable 40 milliGRAM(s) SubCutaneous every 24 hours  ferrous    sulfate 325 milliGRAM(s) Oral daily  furosemide    Tablet 80 milliGRAM(s) Oral daily  gabapentin 300 milliGRAM(s) Oral once  insulin lispro (ADMELOG) corrective regimen sliding scale   SubCutaneous three times a day before meals  insulin lispro (ADMELOG) corrective regimen sliding scale   SubCutaneous at bedtime  isosorbide   mononitrate ER Tablet (IMDUR) 30 milliGRAM(s) Oral daily  mupirocin 2% Nasal 1 Application(s) Both Nostrils two times a day  pantoprazole    Tablet 40 milliGRAM(s) Oral before breakfast  sodium chloride 0.9% lock flush 3 milliLiter(s) IV Push every 8 hours  tamsulosin 0.4 milliGRAM(s) Oral at bedtime    MEDICATIONS  (PRN):    T(C): 36.7 (06-22-22 @ 04:03), Max: 37.2 (06-21-22 @ 12:58)  T(F): 98 (06-22-22 @ 04:03), Max: 98.9 (06-21-22 @ 12:58)  HR: 62 (06-22-22 @ 04:03) (62 - 79)  BP: 135/79 (06-22-22 @ 04:03) (112/74 - 135/79)  RR: 18 (06-22-22 @ 04:03) (18 - 18)  SpO2: 99% (06-22-22 @ 04:03) (98% - 99%)  Daily   170.2 cm  Daily 68.8 kg        Labs:                        12.6   8.89  )-----------( 219      ( 22 Jun 2022 05:47 )             39.8     06-22    134<L>  |  100  |  26<H>  ----------------------------<  129<H>  4.9   |  19<L>  |  0.91    Ca    10.1      22 Jun 2022 05:47          Blood Type: ABO Interpretation: O (06-22 @ 06:10)    HGB A1C: 7.2  Prealbumin:   Pro-BNP:   Thyroid Panel: 06-17 @ 07:41/2.49  1.2/--/--  06-16 @ 08:01/2.08  --/--/--    MRSA:  / MSSA: +Staph Aureus PCR Result: Detected (06.17.22 @ 07:41)      CXR: wdl    EKG:< from: 12 Lead ECG (06.17.22 @ 08:46) >  ORMAL SINUS RHYTHM  LEFT AXIS DEVIATION  RIGHT BUNDLE BRANCH BLOCK  LATERAL INFARCT , AGE UNDETERMINED  INFERIOR INFARCT , AGE UNDETERMINED  ABNORMAL ECG  NO PREVIOUS ECGS AVAILABLE  Confirmed by JACINTA COPE MD    < end of copied text >      PFT's:    Echocardiogram:< from: TTE with Doppler (w/Cont) (06.17.22 @ 14:26) >  Conclusions:  1. Moderate mitral regurgitation.  2. Mildly dilated left atrium.  LA volume index = 37 cc/m2.  3. Normal left ventricular internal dimensions and wall  thickness.  4. Mild segmental left ventricular systolic dysfunction.  Inferior wall hypokinesis, basal to mi anterolateral  hypokinesis.   Endocardial visualization enhanced with  intravenous injection of Ultrasonic Enhancing Agent  (Definity). No left ventricular thrombus.  5. Moderate diastolic dysfunction (Stage II).  6. Normal right ventricular size and function.  *** No previous Echo exam.    < end of copied text >      Cardiac catheterization:< from: Cardiac Catheterization (06.16.22 @ 16:51) >  LM   Ostial left main: There is a 30 % stenosis.    Distal left main: There is a 40 % stenosis.      LAD   Ostial left anterior descending: There is a 70 % stenosis. AMADA Flow  3.  Mid left anterior descending: There is a 20 % stenosis in the distal  third portion of the segment.  First diagonal: There is a 75 % stenosis in the proximal third portion  of the segment. AMADA Flow 3.    CX   Ostial circumflex: There is a 40 % stenosis.    Proximal circumflex: There is a 70 % stenosis in the middle third  portion of the segment. AMADA Flow 3.  Mid circumflex: There is a 40 % stenosis in the middle third portion  of the segment.    RCA   Proximal right coronary artery: Angiography shows minor  irregularities.  Mid right coronary artery: There is a 10 % in-stent restenosis in the  middle third portion of the segment.      < end of copied text >      Vein Mapping:    Gen: WN/WD NAD  Neuro: AAOx3, nonfocal  Pulm: CTA B/L  CV: RRR, S1S2  Abd: Soft, NT, ND +BS  Ext: No edema, + peripheral pulses      Pt has AICD/PPM [ ] Yes  [ x] No             Brand Name:  Pre-op Beta Blocker ordered within 24 hrs of surgery (CABG ONLY)?  Coreg 6.125 mg bid  Type & Cross  [x ] Yes  [ ] No  NPO after Midnight [x ] Yes  [ ] No  Pre-op ABX ordered, to be taped on chart:  [x ] Yes  [ ] No     Hibiclens/Peridex ordered x[ ] Yes  [ ] No  Intraop on Hold: PRBCs, CXR, СВЕТЛАНА [ x]   Consent obtained  [x ] Yes  [ ] No

## 2022-06-23 ENCOUNTER — APPOINTMENT (OUTPATIENT)
Dept: CARDIOTHORACIC SURGERY | Facility: HOSPITAL | Age: 82
End: 2022-06-23

## 2022-06-23 LAB
ALBUMIN SERPL ELPH-MCNC: 3.1 G/DL — LOW (ref 3.3–5)
ALP SERPL-CCNC: 78 U/L — SIGNIFICANT CHANGE UP (ref 40–120)
ALT FLD-CCNC: 22 U/L — SIGNIFICANT CHANGE UP (ref 10–45)
ANION GAP SERPL CALC-SCNC: 14 MMOL/L — SIGNIFICANT CHANGE UP (ref 5–17)
APTT BLD: 33 SEC — SIGNIFICANT CHANGE UP (ref 27.5–35.5)
AST SERPL-CCNC: 52 U/L — HIGH (ref 10–40)
BASE EXCESS BLDV CALC-SCNC: -4.4 MMOL/L — LOW (ref -2–2)
BASE EXCESS BLDV CALC-SCNC: 0.3 MMOL/L — SIGNIFICANT CHANGE UP (ref -2–2)
BASE EXCESS BLDV CALC-SCNC: 0.5 MMOL/L — SIGNIFICANT CHANGE UP (ref -2–2)
BASE EXCESS BLDV CALC-SCNC: 0.6 MMOL/L — SIGNIFICANT CHANGE UP (ref -2–2)
BASE EXCESS BLDV CALC-SCNC: 1.1 MMOL/L — SIGNIFICANT CHANGE UP (ref -2–2)
BASE EXCESS BLDV CALC-SCNC: 3.6 MMOL/L — HIGH (ref -2–2)
BASOPHILS # BLD AUTO: 0.02 K/UL — SIGNIFICANT CHANGE UP (ref 0–0.2)
BASOPHILS NFR BLD AUTO: 0.1 % — SIGNIFICANT CHANGE UP (ref 0–2)
BILIRUB SERPL-MCNC: 1.3 MG/DL — HIGH (ref 0.2–1.2)
BLOOD GAS VENOUS - CREATININE: SIGNIFICANT CHANGE UP MG/DL (ref 0.5–1.3)
BUN SERPL-MCNC: 21 MG/DL — SIGNIFICANT CHANGE UP (ref 7–23)
CA-I SERPL-SCNC: 0.85 MMOL/L — LOW (ref 1.15–1.33)
CA-I SERPL-SCNC: 0.89 MMOL/L — LOW (ref 1.15–1.33)
CA-I SERPL-SCNC: 0.93 MMOL/L — LOW (ref 1.15–1.33)
CA-I SERPL-SCNC: 0.94 MMOL/L — LOW (ref 1.15–1.33)
CA-I SERPL-SCNC: 1.17 MMOL/L — SIGNIFICANT CHANGE UP (ref 1.15–1.33)
CALCIUM SERPL-MCNC: 8 MG/DL — LOW (ref 8.4–10.5)
CHLORIDE BLDV-SCNC: 100 MMOL/L — SIGNIFICANT CHANGE UP (ref 96–108)
CHLORIDE BLDV-SCNC: 102 MMOL/L — SIGNIFICANT CHANGE UP (ref 96–108)
CHLORIDE BLDV-SCNC: 110 MMOL/L — HIGH (ref 96–108)
CHLORIDE BLDV-SCNC: 96 MMOL/L — SIGNIFICANT CHANGE UP (ref 96–108)
CHLORIDE BLDV-SCNC: 99 MMOL/L — SIGNIFICANT CHANGE UP (ref 96–108)
CHLORIDE SERPL-SCNC: 105 MMOL/L — SIGNIFICANT CHANGE UP (ref 96–108)
CK MB BLD-MCNC: 12.1 % — HIGH (ref 0–3.5)
CK MB CFR SERPL CALC: 59.6 NG/ML — HIGH (ref 0–6.7)
CK SERPL-CCNC: 491 U/L — HIGH (ref 30–200)
CO2 BLDV-SCNC: 24 MMOL/L — SIGNIFICANT CHANGE UP (ref 22–26)
CO2 BLDV-SCNC: 27 MMOL/L — HIGH (ref 22–26)
CO2 BLDV-SCNC: 28 MMOL/L — HIGH (ref 22–26)
CO2 BLDV-SCNC: 29 MMOL/L — HIGH (ref 22–26)
CO2 BLDV-SCNC: 30 MMOL/L — HIGH (ref 22–26)
CO2 BLDV-SCNC: 31 MMOL/L — HIGH (ref 22–26)
CO2 SERPL-SCNC: 22 MMOL/L — SIGNIFICANT CHANGE UP (ref 22–31)
CREAT SERPL-MCNC: 0.75 MG/DL — SIGNIFICANT CHANGE UP (ref 0.5–1.3)
EGFR: 92 ML/MIN/1.73M2 — SIGNIFICANT CHANGE UP
EOSINOPHIL # BLD AUTO: 0.03 K/UL — SIGNIFICANT CHANGE UP (ref 0–0.5)
EOSINOPHIL NFR BLD AUTO: 0.2 % — SIGNIFICANT CHANGE UP (ref 0–6)
FIBRINOGEN PPP-MCNC: 415 MG/DL — SIGNIFICANT CHANGE UP (ref 330–520)
GAS PNL BLDA: SIGNIFICANT CHANGE UP
GAS PNL BLDV: 133 MMOL/L — LOW (ref 136–145)
GAS PNL BLDV: 133 MMOL/L — LOW (ref 136–145)
GAS PNL BLDV: 135 MMOL/L — LOW (ref 136–145)
GAS PNL BLDV: 135 MMOL/L — LOW (ref 136–145)
GAS PNL BLDV: 141 MMOL/L — SIGNIFICANT CHANGE UP (ref 136–145)
GAS PNL BLDV: SIGNIFICANT CHANGE UP
GLUCOSE BLDC GLUCOMTR-MCNC: 107 MG/DL — HIGH (ref 70–99)
GLUCOSE BLDC GLUCOMTR-MCNC: 136 MG/DL — HIGH (ref 70–99)
GLUCOSE BLDC GLUCOMTR-MCNC: 144 MG/DL — HIGH (ref 70–99)
GLUCOSE BLDC GLUCOMTR-MCNC: 148 MG/DL — HIGH (ref 70–99)
GLUCOSE BLDC GLUCOMTR-MCNC: 158 MG/DL — HIGH (ref 70–99)
GLUCOSE BLDC GLUCOMTR-MCNC: 163 MG/DL — HIGH (ref 70–99)
GLUCOSE BLDC GLUCOMTR-MCNC: 175 MG/DL — HIGH (ref 70–99)
GLUCOSE BLDV-MCNC: 111 MG/DL — HIGH (ref 70–99)
GLUCOSE BLDV-MCNC: 117 MG/DL — HIGH (ref 70–99)
GLUCOSE BLDV-MCNC: 118 MG/DL — HIGH (ref 70–99)
GLUCOSE BLDV-MCNC: 124 MG/DL — HIGH (ref 70–99)
GLUCOSE BLDV-MCNC: 127 MG/DL — HIGH (ref 70–99)
GLUCOSE SERPL-MCNC: 199 MG/DL — HIGH (ref 70–99)
HCO3 BLDV-SCNC: 22 MMOL/L — SIGNIFICANT CHANGE UP (ref 22–29)
HCO3 BLDV-SCNC: 25 MMOL/L — SIGNIFICANT CHANGE UP (ref 22–29)
HCO3 BLDV-SCNC: 27 MMOL/L — SIGNIFICANT CHANGE UP (ref 22–29)
HCO3 BLDV-SCNC: 28 MMOL/L — SIGNIFICANT CHANGE UP (ref 22–29)
HCO3 BLDV-SCNC: 28 MMOL/L — SIGNIFICANT CHANGE UP (ref 22–29)
HCO3 BLDV-SCNC: 29 MMOL/L — SIGNIFICANT CHANGE UP (ref 22–29)
HCT VFR BLD CALC: 34.7 % — LOW (ref 39–50)
HCT VFR BLDA CALC: 22 % — LOW (ref 39–51)
HCT VFR BLDA CALC: 24 % — LOW (ref 39–51)
HCT VFR BLDA CALC: 24 % — LOW (ref 39–51)
HCT VFR BLDA CALC: 25 % — LOW (ref 39–51)
HCT VFR BLDA CALC: 29 % — LOW (ref 39–51)
HGB BLD CALC-MCNC: 7.4 G/DL — LOW (ref 12.6–17.4)
HGB BLD CALC-MCNC: 7.9 G/DL — LOW (ref 12.6–17.4)
HGB BLD CALC-MCNC: 7.9 G/DL — LOW (ref 12.6–17.4)
HGB BLD CALC-MCNC: 8.4 G/DL — LOW (ref 12.6–17.4)
HGB BLD CALC-MCNC: 9.5 G/DL — LOW (ref 12.6–17.4)
HGB BLD-MCNC: 11.5 G/DL — LOW (ref 13–17)
HOROWITZ INDEX BLDV+IHG-RTO: 40 — SIGNIFICANT CHANGE UP
IMM GRANULOCYTES NFR BLD AUTO: 0.9 % — SIGNIFICANT CHANGE UP (ref 0–1.5)
INR BLD: 1.37 RATIO — HIGH (ref 0.88–1.16)
LACTATE BLDV-MCNC: 0.8 MMOL/L — SIGNIFICANT CHANGE UP (ref 0.7–2)
LACTATE BLDV-MCNC: 0.9 MMOL/L — SIGNIFICANT CHANGE UP (ref 0.7–2)
LACTATE BLDV-MCNC: 1 MMOL/L — SIGNIFICANT CHANGE UP (ref 0.7–2)
LYMPHOCYTES # BLD AUTO: 12.8 % — LOW (ref 13–44)
LYMPHOCYTES # BLD AUTO: 2.1 K/UL — SIGNIFICANT CHANGE UP (ref 1–3.3)
MAGNESIUM SERPL-MCNC: 2.9 MG/DL — HIGH (ref 1.6–2.6)
MCHC RBC-ENTMCNC: 26.7 PG — LOW (ref 27–34)
MCHC RBC-ENTMCNC: 33.1 GM/DL — SIGNIFICANT CHANGE UP (ref 32–36)
MCV RBC AUTO: 80.7 FL — SIGNIFICANT CHANGE UP (ref 80–100)
MONOCYTES # BLD AUTO: 0.95 K/UL — HIGH (ref 0–0.9)
MONOCYTES NFR BLD AUTO: 5.8 % — SIGNIFICANT CHANGE UP (ref 2–14)
NEUTROPHILS # BLD AUTO: 13.15 K/UL — HIGH (ref 1.8–7.4)
NEUTROPHILS NFR BLD AUTO: 80.2 % — HIGH (ref 43–77)
NRBC # BLD: 0 /100 WBCS — SIGNIFICANT CHANGE UP (ref 0–0)
PCO2 BLDV: 41 MMHG — LOW (ref 42–55)
PCO2 BLDV: 47 MMHG — SIGNIFICANT CHANGE UP (ref 42–55)
PCO2 BLDV: 48 MMHG — SIGNIFICANT CHANGE UP (ref 42–55)
PCO2 BLDV: 49 MMHG — SIGNIFICANT CHANGE UP (ref 42–55)
PCO2 BLDV: 55 MMHG — SIGNIFICANT CHANGE UP (ref 42–55)
PCO2 BLDV: 62 MMHG — HIGH (ref 42–55)
PH BLDV: 7.26 — LOW (ref 7.32–7.43)
PH BLDV: 7.27 — LOW (ref 7.32–7.43)
PH BLDV: 7.31 — LOW (ref 7.32–7.43)
PH BLDV: 7.36 — SIGNIFICANT CHANGE UP (ref 7.32–7.43)
PH BLDV: 7.39 — SIGNIFICANT CHANGE UP (ref 7.32–7.43)
PH BLDV: 7.4 — SIGNIFICANT CHANGE UP (ref 7.32–7.43)
PHOSPHATE SERPL-MCNC: 3.2 MG/DL — SIGNIFICANT CHANGE UP (ref 2.5–4.5)
PLATELET # BLD AUTO: 147 K/UL — LOW (ref 150–400)
PO2 BLDV: 131 MMHG — HIGH (ref 25–45)
PO2 BLDV: 133 MMHG — HIGH (ref 25–45)
PO2 BLDV: 164 MMHG — HIGH (ref 25–45)
PO2 BLDV: 41 MMHG — SIGNIFICANT CHANGE UP (ref 25–45)
PO2 BLDV: 83 MMHG — HIGH (ref 25–45)
PO2 BLDV: 93 MMHG — HIGH (ref 25–45)
POTASSIUM BLDV-SCNC: 3.8 MMOL/L — SIGNIFICANT CHANGE UP (ref 3.5–5.1)
POTASSIUM BLDV-SCNC: 4.3 MMOL/L — SIGNIFICANT CHANGE UP (ref 3.5–5.1)
POTASSIUM BLDV-SCNC: 5 MMOL/L — SIGNIFICANT CHANGE UP (ref 3.5–5.1)
POTASSIUM BLDV-SCNC: 6.1 MMOL/L — HIGH (ref 3.5–5.1)
POTASSIUM BLDV-SCNC: 6.2 MMOL/L — CRITICAL HIGH (ref 3.5–5.1)
POTASSIUM SERPL-MCNC: 4.5 MMOL/L — SIGNIFICANT CHANGE UP (ref 3.5–5.3)
POTASSIUM SERPL-SCNC: 4.5 MMOL/L — SIGNIFICANT CHANGE UP (ref 3.5–5.3)
PREALB SERPL-MCNC: 26 MG/DL — SIGNIFICANT CHANGE UP (ref 20–40)
PROT SERPL-MCNC: 5.7 G/DL — LOW (ref 6–8.3)
PROTHROM AB SERPL-ACNC: 15.9 SEC — HIGH (ref 10.5–13.4)
RBC # BLD: 4.3 M/UL — SIGNIFICANT CHANGE UP (ref 4.2–5.8)
RBC # FLD: 15.3 % — HIGH (ref 10.3–14.5)
SAO2 % BLDV: 100 % — HIGH (ref 67–88)
SAO2 % BLDV: 64.5 % — LOW (ref 67–88)
SAO2 % BLDV: 96.6 % — HIGH (ref 67–88)
SAO2 % BLDV: 98.8 % — HIGH (ref 67–88)
SAO2 % BLDV: 99.4 % — HIGH (ref 67–88)
SAO2 % BLDV: 99.6 % — HIGH (ref 67–88)
SODIUM SERPL-SCNC: 141 MMOL/L — SIGNIFICANT CHANGE UP (ref 135–145)
TROPONIN T, HIGH SENSITIVITY RESULT: 299 NG/L — HIGH (ref 0–51)
WBC # BLD: 16.39 K/UL — HIGH (ref 3.8–10.5)
WBC # FLD AUTO: 16.39 K/UL — HIGH (ref 3.8–10.5)

## 2022-06-23 PROCEDURE — 33426 REPAIR OF MITRAL VALVE: CPT | Mod: AS

## 2022-06-23 PROCEDURE — 33533 CABG ARTERIAL SINGLE: CPT | Mod: AS

## 2022-06-23 PROCEDURE — 93010 ELECTROCARDIOGRAM REPORT: CPT

## 2022-06-23 PROCEDURE — 33426 REPAIR OF MITRAL VALVE: CPT

## 2022-06-23 PROCEDURE — 33533 CABG ARTERIAL SINGLE: CPT

## 2022-06-23 PROCEDURE — 71045 X-RAY EXAM CHEST 1 VIEW: CPT | Mod: 26

## 2022-06-23 PROCEDURE — 33508 ENDOSCOPIC VEIN HARVEST: CPT | Mod: 59

## 2022-06-23 PROCEDURE — 33517 CABG ARTERY-VEIN SINGLE: CPT

## 2022-06-23 PROCEDURE — 33517 CABG ARTERY-VEIN SINGLE: CPT | Mod: AS

## 2022-06-23 PROCEDURE — 99291 CRITICAL CARE FIRST HOUR: CPT

## 2022-06-23 DEVICE — CANNULA VESSEL 3MM BLUNT TIP CLEAR 1-WAY VALVE: Type: IMPLANTABLE DEVICE | Status: FUNCTIONAL

## 2022-06-23 DEVICE — KIT CVC 2LUM MAC 9FR CHG: Type: IMPLANTABLE DEVICE | Status: FUNCTIONAL

## 2022-06-23 DEVICE — CHEST DRAIN THORACIC ARGYLE PVC 32FR RIGHT ANGLE: Type: IMPLANTABLE DEVICE | Status: FUNCTIONAL

## 2022-06-23 DEVICE — IMPLANTABLE DEVICE: Type: IMPLANTABLE DEVICE | Status: FUNCTIONAL

## 2022-06-23 DEVICE — CANNULA AORTIC ROOT 14G X 14CM FLANGED: Type: IMPLANTABLE DEVICE | Status: FUNCTIONAL

## 2022-06-23 DEVICE — CANNULA VENOUS 1 STAGE STRAIGHT 32FR X 3/8": Type: IMPLANTABLE DEVICE | Status: FUNCTIONAL

## 2022-06-23 DEVICE — CANNULA ARTERIAL STRAIGHT 20FR X 3/8" VENTED: Type: IMPLANTABLE DEVICE | Status: FUNCTIONAL

## 2022-06-23 DEVICE — LIGATING CLIPS WECK HORIZON MEDIUM (BLUE) 24: Type: IMPLANTABLE DEVICE | Status: FUNCTIONAL

## 2022-06-23 DEVICE — LIGATING CLIPS WECK HORIZON SMALL-WIDE (RED) 24: Type: IMPLANTABLE DEVICE | Status: FUNCTIONAL

## 2022-06-23 DEVICE — CANNULA VENOUS 1 STAGE RIGHT ANGLE METAL TIP 28FR X 3/8": Type: IMPLANTABLE DEVICE | Status: FUNCTIONAL

## 2022-06-23 DEVICE — CHEST DRAIN THORACIC ARGYLE PVC 36FR STRAIGHT: Type: IMPLANTABLE DEVICE | Status: FUNCTIONAL

## 2022-06-23 DEVICE — CANNULA VENOUS 2 STAGE THIN FLEX 36/46FR X 1/2" WITH RETURN DIAL: Type: IMPLANTABLE DEVICE | Status: FUNCTIONAL

## 2022-06-23 DEVICE — KIT A-LINE 1LUM 20G X 12CM SAFE KIT: Type: IMPLANTABLE DEVICE | Status: FUNCTIONAL

## 2022-06-23 DEVICE — PACING WIRE BLUE DARK 2-0 24" SH SKS-3: Type: IMPLANTABLE DEVICE | Status: FUNCTIONAL

## 2022-06-23 RX ORDER — MUPIROCIN 20 MG/G
1 OINTMENT TOPICAL
Refills: 0 | Status: COMPLETED | OUTPATIENT
Start: 2022-06-23 | End: 2022-06-28

## 2022-06-23 RX ORDER — ACETAMINOPHEN 500 MG
650 TABLET ORAL EVERY 6 HOURS
Refills: 0 | Status: COMPLETED | OUTPATIENT
Start: 2022-06-23 | End: 2022-06-26

## 2022-06-23 RX ORDER — INSULIN HUMAN 100 [IU]/ML
3 INJECTION, SOLUTION SUBCUTANEOUS
Qty: 100 | Refills: 0 | Status: DISCONTINUED | OUTPATIENT
Start: 2022-06-23 | End: 2022-06-26

## 2022-06-23 RX ORDER — DEXMEDETOMIDINE HYDROCHLORIDE IN 0.9% SODIUM CHLORIDE 4 UG/ML
0.7 INJECTION INTRAVENOUS
Qty: 200 | Refills: 0 | Status: DISCONTINUED | OUTPATIENT
Start: 2022-06-23 | End: 2022-06-23

## 2022-06-23 RX ORDER — ALBUMIN HUMAN 25 %
250 VIAL (ML) INTRAVENOUS ONCE
Refills: 0 | Status: COMPLETED | OUTPATIENT
Start: 2022-06-23 | End: 2022-06-23

## 2022-06-23 RX ORDER — CHLORHEXIDINE GLUCONATE 213 G/1000ML
5 SOLUTION TOPICAL
Refills: 0 | Status: DISCONTINUED | OUTPATIENT
Start: 2022-06-23 | End: 2022-06-23

## 2022-06-23 RX ORDER — DEXTROSE 50 % IN WATER 50 %
25 SYRINGE (ML) INTRAVENOUS
Refills: 0 | Status: DISCONTINUED | OUTPATIENT
Start: 2022-06-23 | End: 2022-06-23

## 2022-06-23 RX ORDER — SODIUM CHLORIDE 9 MG/ML
1000 INJECTION INTRAMUSCULAR; INTRAVENOUS; SUBCUTANEOUS
Refills: 0 | Status: DISCONTINUED | OUTPATIENT
Start: 2022-06-23 | End: 2022-06-26

## 2022-06-23 RX ORDER — AMIODARONE HYDROCHLORIDE 400 MG/1
400 TABLET ORAL
Refills: 0 | Status: COMPLETED | OUTPATIENT
Start: 2022-06-23 | End: 2022-06-26

## 2022-06-23 RX ORDER — CHLORHEXIDINE GLUCONATE 213 G/1000ML
1 SOLUTION TOPICAL DAILY
Refills: 0 | Status: DISCONTINUED | OUTPATIENT
Start: 2022-06-23 | End: 2022-06-29

## 2022-06-23 RX ORDER — POTASSIUM CHLORIDE 20 MEQ
10 PACKET (EA) ORAL
Refills: 0 | Status: COMPLETED | OUTPATIENT
Start: 2022-06-23 | End: 2022-06-23

## 2022-06-23 RX ORDER — ASCORBIC ACID 60 MG
500 TABLET,CHEWABLE ORAL
Refills: 0 | Status: COMPLETED | OUTPATIENT
Start: 2022-06-23 | End: 2022-06-28

## 2022-06-23 RX ORDER — OXYCODONE HYDROCHLORIDE 5 MG/1
5 TABLET ORAL EVERY 4 HOURS
Refills: 0 | Status: DISCONTINUED | OUTPATIENT
Start: 2022-06-23 | End: 2022-06-29

## 2022-06-23 RX ORDER — CHLORHEXIDINE GLUCONATE 213 G/1000ML
15 SOLUTION TOPICAL EVERY 12 HOURS
Refills: 0 | Status: DISCONTINUED | OUTPATIENT
Start: 2022-06-23 | End: 2022-06-23

## 2022-06-23 RX ORDER — CEFUROXIME AXETIL 250 MG
1500 TABLET ORAL EVERY 8 HOURS
Refills: 0 | Status: COMPLETED | OUTPATIENT
Start: 2022-06-23 | End: 2022-06-25

## 2022-06-23 RX ORDER — DOBUTAMINE HCL 250MG/20ML
2.5 VIAL (ML) INTRAVENOUS
Qty: 500 | Refills: 0 | Status: DISCONTINUED | OUTPATIENT
Start: 2022-06-23 | End: 2022-06-25

## 2022-06-23 RX ORDER — PANTOPRAZOLE SODIUM 20 MG/1
40 TABLET, DELAYED RELEASE ORAL DAILY
Refills: 0 | Status: DISCONTINUED | OUTPATIENT
Start: 2022-06-23 | End: 2022-06-25

## 2022-06-23 RX ORDER — ASPIRIN/CALCIUM CARB/MAGNESIUM 324 MG
300 TABLET ORAL ONCE
Refills: 0 | Status: DISCONTINUED | OUTPATIENT
Start: 2022-06-23 | End: 2022-06-23

## 2022-06-23 RX ORDER — ASPIRIN/CALCIUM CARB/MAGNESIUM 324 MG
81 TABLET ORAL DAILY
Refills: 0 | Status: DISCONTINUED | OUTPATIENT
Start: 2022-06-23 | End: 2022-06-29

## 2022-06-23 RX ORDER — POTASSIUM CHLORIDE 20 MEQ
10 PACKET (EA) ORAL
Refills: 0 | Status: DISCONTINUED | OUTPATIENT
Start: 2022-06-23 | End: 2022-06-25

## 2022-06-23 RX ORDER — ACETAMINOPHEN 500 MG
650 TABLET ORAL EVERY 6 HOURS
Refills: 0 | Status: DISCONTINUED | OUTPATIENT
Start: 2022-06-26 | End: 2022-06-29

## 2022-06-23 RX ORDER — SODIUM CHLORIDE 9 MG/ML
500 INJECTION, SOLUTION INTRAVENOUS
Refills: 0 | Status: DISCONTINUED | OUTPATIENT
Start: 2022-06-23 | End: 2022-06-24

## 2022-06-23 RX ORDER — SENNA PLUS 8.6 MG/1
2 TABLET ORAL AT BEDTIME
Refills: 0 | Status: DISCONTINUED | OUTPATIENT
Start: 2022-06-24 | End: 2022-06-29

## 2022-06-23 RX ORDER — POLYETHYLENE GLYCOL 3350 17 G/17G
17 POWDER, FOR SOLUTION ORAL DAILY
Refills: 0 | Status: DISCONTINUED | OUTPATIENT
Start: 2022-06-24 | End: 2022-06-29

## 2022-06-23 RX ORDER — CALCIUM GLUCONATE 100 MG/ML
2 VIAL (ML) INTRAVENOUS ONCE
Refills: 0 | Status: COMPLETED | OUTPATIENT
Start: 2022-06-23 | End: 2022-06-23

## 2022-06-23 RX ORDER — NOREPINEPHRINE BITARTRATE/D5W 8 MG/250ML
0.04 PLASTIC BAG, INJECTION (ML) INTRAVENOUS
Qty: 8 | Refills: 0 | Status: DISCONTINUED | OUTPATIENT
Start: 2022-06-23 | End: 2022-06-24

## 2022-06-23 RX ORDER — DEXTROSE 50 % IN WATER 50 %
50 SYRINGE (ML) INTRAVENOUS
Refills: 0 | Status: DISCONTINUED | OUTPATIENT
Start: 2022-06-23 | End: 2022-06-25

## 2022-06-23 RX ORDER — HYDROMORPHONE HYDROCHLORIDE 2 MG/ML
0.5 INJECTION INTRAMUSCULAR; INTRAVENOUS; SUBCUTANEOUS EVERY 6 HOURS
Refills: 0 | Status: DISCONTINUED | OUTPATIENT
Start: 2022-06-23 | End: 2022-06-25

## 2022-06-23 RX ORDER — OXYCODONE HYDROCHLORIDE 5 MG/1
10 TABLET ORAL EVERY 4 HOURS
Refills: 0 | Status: DISCONTINUED | OUTPATIENT
Start: 2022-06-23 | End: 2022-06-29

## 2022-06-23 RX ORDER — GABAPENTIN 400 MG/1
100 CAPSULE ORAL EVERY 8 HOURS
Refills: 0 | Status: COMPLETED | OUTPATIENT
Start: 2022-06-23 | End: 2022-06-28

## 2022-06-23 RX ORDER — MEPERIDINE HYDROCHLORIDE 50 MG/ML
25 INJECTION INTRAMUSCULAR; INTRAVENOUS; SUBCUTANEOUS ONCE
Refills: 0 | Status: DISCONTINUED | OUTPATIENT
Start: 2022-06-23 | End: 2022-06-23

## 2022-06-23 RX ADMIN — CHLORHEXIDINE GLUCONATE 15 MILLILITER(S): 213 SOLUTION TOPICAL at 17:29

## 2022-06-23 RX ADMIN — Medication 200 GRAM(S): at 19:42

## 2022-06-23 RX ADMIN — HYDROMORPHONE HYDROCHLORIDE 0.5 MILLIGRAM(S): 2 INJECTION INTRAMUSCULAR; INTRAVENOUS; SUBCUTANEOUS at 21:50

## 2022-06-23 RX ADMIN — AMLODIPINE BESYLATE 5 MILLIGRAM(S): 2.5 TABLET ORAL at 05:12

## 2022-06-23 RX ADMIN — Medication 100 MILLIEQUIVALENT(S): at 15:59

## 2022-06-23 RX ADMIN — SODIUM CHLORIDE 3 MILLILITER(S): 9 INJECTION INTRAMUSCULAR; INTRAVENOUS; SUBCUTANEOUS at 05:15

## 2022-06-23 RX ADMIN — Medication 125 MILLILITER(S): at 17:31

## 2022-06-23 RX ADMIN — Medication 1000 MILLIGRAM(S): at 05:32

## 2022-06-23 RX ADMIN — Medication 4.95 MICROGRAM(S)/KG/MIN: at 19:42

## 2022-06-23 RX ADMIN — MUPIROCIN 1 APPLICATION(S): 20 OINTMENT TOPICAL at 05:13

## 2022-06-23 RX ADMIN — Medication 650 MILLIGRAM(S): at 17:16

## 2022-06-23 RX ADMIN — CHLORHEXIDINE GLUCONATE 5 MILLILITER(S): 213 SOLUTION TOPICAL at 17:15

## 2022-06-23 RX ADMIN — Medication 100 MILLIEQUIVALENT(S): at 17:01

## 2022-06-23 RX ADMIN — Medication 500 MILLIGRAM(S): at 17:16

## 2022-06-23 RX ADMIN — Medication 80 MILLIGRAM(S): at 05:12

## 2022-06-23 RX ADMIN — Medication 125 MILLILITER(S): at 19:42

## 2022-06-23 RX ADMIN — Medication 1000 MILLIGRAM(S): at 05:12

## 2022-06-23 RX ADMIN — Medication 81 MILLIGRAM(S): at 21:37

## 2022-06-23 RX ADMIN — INSULIN HUMAN 3 UNIT(S)/HR: 100 INJECTION, SOLUTION SUBCUTANEOUS at 19:43

## 2022-06-23 RX ADMIN — GABAPENTIN 100 MILLIGRAM(S): 400 CAPSULE ORAL at 21:38

## 2022-06-23 RX ADMIN — GABAPENTIN 300 MILLIGRAM(S): 400 CAPSULE ORAL at 05:12

## 2022-06-23 RX ADMIN — AMIODARONE HYDROCHLORIDE 400 MILLIGRAM(S): 400 TABLET ORAL at 17:16

## 2022-06-23 RX ADMIN — CHLORHEXIDINE GLUCONATE 30 MILLILITER(S): 213 SOLUTION TOPICAL at 05:12

## 2022-06-23 RX ADMIN — CARVEDILOL PHOSPHATE 6.25 MILLIGRAM(S): 80 CAPSULE, EXTENDED RELEASE ORAL at 05:12

## 2022-06-23 RX ADMIN — Medication 100 MILLIGRAM(S): at 15:59

## 2022-06-23 RX ADMIN — PANTOPRAZOLE SODIUM 40 MILLIGRAM(S): 20 TABLET, DELAYED RELEASE ORAL at 05:11

## 2022-06-23 RX ADMIN — Medication 650 MILLIGRAM(S): at 17:41

## 2022-06-23 RX ADMIN — Medication 100 MILLIEQUIVALENT(S): at 15:34

## 2022-06-23 RX ADMIN — MUPIROCIN 1 APPLICATION(S): 20 OINTMENT TOPICAL at 17:16

## 2022-06-23 RX ADMIN — SODIUM CHLORIDE 999 MILLILITER(S): 9 INJECTION, SOLUTION INTRAVENOUS at 16:21

## 2022-06-23 RX ADMIN — Medication 9.9 MICROGRAM(S)/KG/MIN: at 19:43

## 2022-06-23 RX ADMIN — PANTOPRAZOLE SODIUM 40 MILLIGRAM(S): 20 TABLET, DELAYED RELEASE ORAL at 17:15

## 2022-06-23 RX ADMIN — HYDROMORPHONE HYDROCHLORIDE 0.5 MILLIGRAM(S): 2 INJECTION INTRAMUSCULAR; INTRAVENOUS; SUBCUTANEOUS at 21:35

## 2022-06-23 RX ADMIN — Medication 100 MILLIEQUIVALENT(S): at 18:38

## 2022-06-23 RX ADMIN — Medication 100 MILLIEQUIVALENT(S): at 19:24

## 2022-06-23 RX ADMIN — CHLORHEXIDINE GLUCONATE 1 APPLICATION(S): 213 SOLUTION TOPICAL at 05:33

## 2022-06-23 NOTE — PROGRESS NOTE ADULT - SUBJECTIVE AND OBJECTIVE BOX
Patient seen and examined at the bedside.    Remained critically ill on continuous ICU monitoring.    OBJECTIVE:  Vital Signs Last 24 Hrs  T(C): 35.2 (23 Jun 2022 16:00), Max: 36.9 (22 Jun 2022 19:04)  T(F): 95.4 (23 Jun 2022 16:00), Max: 98.4 (22 Jun 2022 19:04)  HR: 75 (23 Jun 2022 17:30) (63 - 80)  BP: 137/88 (23 Jun 2022 06:30) (118/76 - 137/88)  BP(mean): 104 (23 Jun 2022 04:33) (104 - 104)  RR: 12 (23 Jun 2022 17:30) (12 - 18)  SpO2: 100% (23 Jun 2022 17:30) (98% - 100%)    REVIEW OF SYSTEMS:  [x ] N/A    Physical Exam:  General: intubated multiple lines gtt & tubes   Neurology: sedated   Eyes: bilaeral pupils equal and reactive   ENT/Neck: +ETT midline, Neck supple, trachea midline, No JVD   Respiratory: Rales noted bilaterally   CV: RRR, S1S2, no murmurs        [x] Sternal dressing, [x] Mediastinal CT        [x] Sinus rhythm, [x] Temporary pacing -VVI 50   Abdominal: Soft, NT, ND +BS,   Extremities: 1-2+ pedal edema noted, + peripheral pulses   Skin: No Rashes, Hematoma, Ecchymosis                           Assessment:  80 yo male with cad s/p stent x2 on plavix, ischemic cardiomyopathy w/ mild LV dysfunction, HLD, CHF, KIMMY, paroxysmal afib (not on ac due to bleed in the past?), HTN, DM2 presents to the ER with acute R sided chest pressure starting around 11AM today. Admitted for further evaluation due to elevated cardiac enzymes.    CAD s/p CABGx2 MVR POD#0  Systolic HF    Plan:   ***Neuro***  [x] Sedated with [x] Precedex  Post operative neuro assessment     ***Cardiovascular***  Invasive hemodynamic monitoring, assess perfusion indices   SR / CVP 3/ Hct 32%/ Lactate 1.7  [x] Levophed 0.04 mcgs [x] Dobutamine 5.0 mcgs   Volume: [] Albumin __ [] Crystalloid __ [] PRBC __ [] Plts __ [] Cryo __ [] Plasma __ [] FEIBA __  Reassessment of hemodynamics post resuscitation   Monitor chest tube outputs   Continue amio for afib prophylaxis   [] Bleeding ___ / [] Nonbleeding ___   [x] ASA  Serial EKG and cardiac enzymes     ***Pulmonary***  Post op vent management  Titration of FiO2 and PEEP, follow SpO2, CXR, blood gases   Mode: AC/ CMV (Assist Control/ Continuous Mandatory Ventilation)  RR (machine): 12  TV (machine): 500  FiO2: 50  PEEP: 5  ITime: 1  MAP: 8  PIP: 24            Will plan to wean & extubate once pt is hemodynamically stable and not bleeding    ***GI***  [x] NPO, will advance as tolerated  [x] Protonix     ***Renal***  GFR 92  Continue to monitor I/Os, BUN/Creatinine.   Replete lytes PRN  [place] present    ***ID***  Perioperative coverage with Cefuroxime.     ***Endocrine***  [x] DM2  : HbA1c 7.2%              - [x] Insulin gtt              - Need tight glycemic control to prevent wound infection.      Patient requires continuous monitoring with bedside rhythm monitoring, pulse oximetry monitoring, and continuous central venous and arterial pressure monitoring; and intermittent blood gas analysis. Care plan discussed with the ICU care team.   Patient remained critical, at risk for life threatening decompensation.    I have spent 30 minutes providing critical care management to this patient.    By signing my name below, I, Pascale Katz, attest that this documentation has been prepared under the direction and in the presence of Peyton Sierra MD   Electronically signed: Mini Mosley, 06-23-22 @ 17:36    I, Peyton Sierra, personally performed the services described in this documentation. all medical record entries made by the darrianibjonah were at my direction and in my presence. I have reviewed the chart and agree that the record reflects my personal performance and is accurate and complete  Electronically signed: Peyton Sierra MD  Patient seen and examined at the bedside.    Remained critically ill on continuous ICU monitoring.    OBJECTIVE:  Vital Signs Last 24 Hrs  T(C): 35.2 (23 Jun 2022 16:00), Max: 36.9 (22 Jun 2022 19:04)  T(F): 95.4 (23 Jun 2022 16:00), Max: 98.4 (22 Jun 2022 19:04)  HR: 75 (23 Jun 2022 17:30) (63 - 80)  BP: 137/88 (23 Jun 2022 06:30) (118/76 - 137/88)  BP(mean): 104 (23 Jun 2022 04:33) (104 - 104)  RR: 12 (23 Jun 2022 17:30) (12 - 18)  SpO2: 100% (23 Jun 2022 17:30) (98% - 100%)    REVIEW OF SYSTEMS:  [x ] N/A    Physical Exam:  General: intubated multiple lines gtt & tubes   Neurology: sedated   Eyes: bilaeral pupils equal and reactive   ENT/Neck: +ETT midline, Neck supple, trachea midline, No JVD   Respiratory: Rales noted bilaterally   CV: RRR, S1S2, no murmurs        [x] Sternal dressing, [x] Mediastinal CT x1 [x] L CT X1         [x] Sinus rhythm, [x] Temporary pacing -VVI 50 / 10   Abdominal: Soft, NT, ND +BS,   Extremities: 1-2+ pedal edema noted, + peripheral pulses   Skin: No Rashes, Hematoma, Ecchymosis                           Assessment:     68 yr old M DM2 / HTN / HLD / CAD S/P PCI w multiple stents / A-Fib    S/P NSTEMI / Cath multivessel CAD / moderate LV Systolic dysfunction EF 40% / Severe MR    S/P CABG X2 / MV repair POD # 0    Post op multifactorial hypotension  related to hypovolemia & cardiac dysfunction    DM2 / Hyperglycemia            Plan:   ***Neuro***  [x] Sedated with [x] Precedex  Post operative neuro assessment     ***Cardiovascular***  Post op unstable hemodynamics requiring large volume resuscitation, inotrope & vasopressor    Invasive hemodynamic monitoring, assess serial perfusion indices   SR RBBB 80 / CVP 1-3/ Hct 32%/ Lactate 1.7 / ScVO2   [x] Levophed 0.04 mcg/kg/min [x] Dobutamine 5.0 mcg/kg/min   Volume: [x] Albumin 500 cc [] Crystalloid 1750 cc   Reassessment of hemodynamics post resuscitation will trend ScVO2   Monitor chest tube outputs   Amio  for Afib prophylaxis / VVI 50/10 / A-Fib history post in SR w RBBB will continue to monitor    [x] Nonbleeding   [x] ASA [x] high intensity statins once extubated   Serial EKG and cardiac enzymes   will need AC once at low risk for bleeding   maintain K >4 / Mg >2     ***Pulmonary***  Post op vent management  Titration of FiO2 and PEEP, follow SpO2, CXR, blood gases   Mode: AC/ CMV (Assist Control/ Continuous Mandatory Ventilation)  RR (machine): 12  TV (machine): 500  FiO2: 50  PEEP: 5  ITime: 1  MAP: 8  PIP: 24            Will plan to wean & extubate once pt is hemodynamically stable and not bleeding    ***GI***  [x] NPO, will advance as tolerated  [x] Protonix     ***Renal***  GFR 92  Continue to monitor I/Os, BUN/Creatinine.   Replete lytes PRN  Rosemarie present    ***ID***  Perioperative coverage with Cefuroxime.     ***Endocrine***  [x] DM2  : HbA1c 7.2%              - [x] Insulin gtt              - Need tight glycemic control to prevent wound infection.      Patient requires continuous monitoring with bedside rhythm monitoring, pulse oximetry monitoring, and continuous central venous and arterial pressure monitoring; and intermittent blood gas analysis. Care plan discussed with the ICU care team.   Patient remained critical, at risk for life threatening decompensation.    I have spent 30 minutes providing critical care management to this patient.    By signing my name below, I, Pascale Katz, attest that this documentation has been prepared under the direction and in the presence of Peyton Sierra MD   Electronically signed: Mini Mosley, 06-23-22 @ 17:36    I, Peyton Sierra, personally performed the services described in this documentation. all medical record entries made by the scribe were at my direction and in my presence. I have reviewed the chart and agree that the record reflects my personal performance and is accurate and complete  Electronically signed: Peyton Sierra MD

## 2022-06-23 NOTE — PROGRESS NOTE ADULT - SUBJECTIVE AND OBJECTIVE BOX
DATE OF SERVICE: 06-23-22      Patient is a 81y old  Male who presents with a chief complaint of CAD, left main disease (23 Jun 2022 17:34)      INTERVAL HISTORY: feels ok     	  MEDICATIONS:  aMIOdarone    Tablet 400 milliGRAM(s) Oral two times a day  DOBUTamine Infusion 5 MICROgram(s)/kG/Min IV Continuous <Continuous>  norepinephrine Infusion 0.04 MICROgram(s)/kG/Min IV Continuous <Continuous>        PHYSICAL EXAM:  T(C): 36.6 (06-24-22 @ 00:00), Max: 36.6 (06-23-22 @ 04:33)  HR: 80 (06-24-22 @ 00:00) (63 - 80)  BP: 137/88 (06-23-22 @ 06:30) (137/88 - 137/88)  RR: 13 (06-24-22 @ 00:00) (11 - 25)  SpO2: 100% (06-24-22 @ 00:00) (99% - 100%)  Wt(kg): --  I&O's Summary    22 Jun 2022 07:01  -  23 Jun 2022 07:00  --------------------------------------------------------  IN: 800 mL / OUT: 1700 mL / NET: -900 mL    23 Jun 2022 07:01  -  24 Jun 2022 00:41  --------------------------------------------------------  IN: 2688.7 mL / OUT: 2055 mL / NET: 633.7 mL      Height (cm): 170.2 (06-23 @ 06:30)  Weight (kg): 66 (06-23 @ 06:30)  BMI (kg/m2): 22.8 (06-23 @ 06:30)  BSA (m2): 1.77 (06-23 @ 06:30)    Appearance: In no distress	  HEENT:    PERRL, EOMI	  Cardiovascular:  S1 S2, No JVD  Respiratory: Lungs clear to auscultation	  Gastrointestinal:  Soft, Non-tender, + BS	  Vascularature:  No edema of LE  Psychiatric: Appropriate affect   Neuro: no acute focal deficits                               11.5   16.39 )-----------( 147      ( 23 Jun 2022 14:21 )             34.7     06-23    141  |  105  |  21  ----------------------------<  199<H>  4.5   |  22  |  0.75    Ca    8.0<L>      23 Jun 2022 14:22  Phos  3.2     06-23  Mg     2.9     06-23    TPro  5.7<L>  /  Alb  3.1<L>  /  TBili  1.3<H>  /  DBili  x   /  AST  52<H>  /  ALT  22  /  AlkPhos  78  06-23        Labs personally reviewed      ASSESSMENT/PLAN: 	    82 yo male with cad s/p stent x2 on plavix, ischemic cardiomyopathy w/ mild LV dysfunction, HLD, CHF, KIMMY, paroxysmal afib (not on ac due to bleed in the past?), HTN, DM2 presents to the ER with acute R sided chest pressure starting around 11AM today. Admitted for further evaluation due to elevated cardiac enzymes.    Pt s/p cardiac catherization with evidence of CAD with left main disease. PT transferred to [facility] for surgical evaluation with Dr. Perez. Labs and imaging to be reviewed with Dr. Perez.     Problem/Recommendation - 1:  ·  Problem; CAD   - EKG noted   - s/p LHC showing CAD of left main   - TTE shows EF 45%  and Severe mitral regurgitation with an eccentric, anteriorly directed jet.  Mild to moderate segmental left ventricular systolic dysfunction.  Hypokinesis of the lateral, inferolateral, and basal inferior walls  - Post op CABG and MV annuloplaty  - c/w ASA 81, Atorvastatin 80, Coreg 6.25 BID and  Imdur 30mg QD     Problem/Recommendation - 2:  ·  Problem; :Systolic HF (heart failure).   - TTE as noted above   - c/w lasix   - on Imdur, Metoprolol, coreg, spironolactone on hold post ip  - Monitor BP       Problem/Recommendation - 3:  ·  Problem; HTN   - hypotensive post op      Problem/Recommendation - 4:  ·  Problem: DM   - ISS       Problem/Recommendation - 5:  ·  Problem: HLD   - c/w statin         Prasanna Scott DO [facility]  Cardiovascular Medicine  [address] [place] [address], [address]  Office: [phone]  Cell: [phone]

## 2022-06-23 NOTE — BRIEF OPERATIVE NOTE - NSICDXBRIEFPOSTOP_GEN_ALL_CORE_FT
POST-OP DIAGNOSIS:  CAD (coronary artery disease) 23-Jun-2022 14:15:17  Tyrel Padilla  Mitral regurgitation 23-Jun-2022 14:15:26  Tyrel Padilla

## 2022-06-23 NOTE — BRIEF OPERATIVE NOTE - NSICDXBRIEFPREOP_GEN_ALL_CORE_FT
PRE-OP DIAGNOSIS:  CAD (coronary artery disease) 23-Jun-2022 14:14:45  Tyrel Padilla  Mitral regurgitation 23-Jun-2022 14:15:01  Tyrel Padilla

## 2022-06-23 NOTE — AIRWAY REMOVAL NOTE  ADULT & PEDS - ARTIFICAL AIRWAY REMOVAL COMMENTS
Written order for extubation verified. The patient was identified by full name and birth date compared to the identification band. Present during the procedure was Ana Holt RN.

## 2022-06-23 NOTE — BRIEF OPERATIVE NOTE - NSICDXBRIEFPROCEDURE_GEN_ALL_CORE_FT
PROCEDURES:  CABG, with СВЕТЛАНА 23-Jun-2022 14:13:45  Tyrel Padilla  Repair, mitral valve, with СВЕТЛАНА 23-Jun-2022 14:14:34  Tyrel Padilla

## 2022-06-23 NOTE — BRIEF OPERATIVE NOTE - OPERATION/FINDINGS
Multivessel CAD and severe MR. s/p C2L (LIMA-LAD, LSVG-OM), and MV Repair with size 28 Physio Ring  EF improved from 30 -> 45% (On Dobutamine 5), RV improved from mild dysfunction to normal function. MR improved from severe to mild.

## 2022-06-23 NOTE — BRIEF OPERATIVE NOTE - COMMENTS
Aortic Cross Clamp Time: 101 min  Drips: Dobutamine 5, Levophed 0.04, Precedex 0.7  EBL N/A due to cell saver  I first assisted for the entirety of the case, including sternotomy, cardiopulmonary bypass, placement of coronary grafts, placement of Mitral Valve Ring and closing.  There were no residents/fellows available to assist for this case

## 2022-06-24 LAB
ALBUMIN SERPL ELPH-MCNC: 3.9 G/DL — SIGNIFICANT CHANGE UP (ref 3.3–5)
ALP SERPL-CCNC: 59 U/L — SIGNIFICANT CHANGE UP (ref 40–120)
ALT FLD-CCNC: 22 U/L — SIGNIFICANT CHANGE UP (ref 10–45)
ANION GAP SERPL CALC-SCNC: 13 MMOL/L — SIGNIFICANT CHANGE UP (ref 5–17)
APTT BLD: 32 SEC — SIGNIFICANT CHANGE UP (ref 27.5–35.5)
AST SERPL-CCNC: 65 U/L — HIGH (ref 10–40)
BASE EXCESS BLDV CALC-SCNC: -0.8 MMOL/L — SIGNIFICANT CHANGE UP (ref -2–2)
BASE EXCESS BLDV CALC-SCNC: -1.3 MMOL/L — SIGNIFICANT CHANGE UP (ref -2–2)
BASE EXCESS BLDV CALC-SCNC: -1.5 MMOL/L — SIGNIFICANT CHANGE UP (ref -2–2)
BASE EXCESS BLDV CALC-SCNC: 0 MMOL/L — SIGNIFICANT CHANGE UP (ref -2–2)
BASE EXCESS BLDV CALC-SCNC: 0 MMOL/L — SIGNIFICANT CHANGE UP (ref -2–2)
BASE EXCESS BLDV CALC-SCNC: 3 MMOL/L — HIGH (ref -2–2)
BILIRUB SERPL-MCNC: 1 MG/DL — SIGNIFICANT CHANGE UP (ref 0.2–1.2)
BUN SERPL-MCNC: 16 MG/DL — SIGNIFICANT CHANGE UP (ref 7–23)
CALCIUM SERPL-MCNC: 9 MG/DL — SIGNIFICANT CHANGE UP (ref 8.4–10.5)
CHLORIDE SERPL-SCNC: 105 MMOL/L — SIGNIFICANT CHANGE UP (ref 96–108)
CO2 BLDV-SCNC: 25 MMOL/L — SIGNIFICANT CHANGE UP (ref 22–26)
CO2 BLDV-SCNC: 26 MMOL/L — SIGNIFICANT CHANGE UP (ref 22–26)
CO2 BLDV-SCNC: 27 MMOL/L — HIGH (ref 22–26)
CO2 BLDV-SCNC: 30 MMOL/L — HIGH (ref 22–26)
CO2 SERPL-SCNC: 23 MMOL/L — SIGNIFICANT CHANGE UP (ref 22–31)
CREAT SERPL-MCNC: 0.75 MG/DL — SIGNIFICANT CHANGE UP (ref 0.5–1.3)
EGFR: 92 ML/MIN/1.73M2 — SIGNIFICANT CHANGE UP
GAS PNL BLDA: SIGNIFICANT CHANGE UP
GAS PNL BLDV: SIGNIFICANT CHANGE UP
GLUCOSE BLDC GLUCOMTR-MCNC: 103 MG/DL — HIGH (ref 70–99)
GLUCOSE BLDC GLUCOMTR-MCNC: 106 MG/DL — HIGH (ref 70–99)
GLUCOSE BLDC GLUCOMTR-MCNC: 112 MG/DL — HIGH (ref 70–99)
GLUCOSE BLDC GLUCOMTR-MCNC: 116 MG/DL — HIGH (ref 70–99)
GLUCOSE BLDC GLUCOMTR-MCNC: 117 MG/DL — HIGH (ref 70–99)
GLUCOSE BLDC GLUCOMTR-MCNC: 120 MG/DL — HIGH (ref 70–99)
GLUCOSE BLDC GLUCOMTR-MCNC: 123 MG/DL — HIGH (ref 70–99)
GLUCOSE BLDC GLUCOMTR-MCNC: 129 MG/DL — HIGH (ref 70–99)
GLUCOSE BLDC GLUCOMTR-MCNC: 132 MG/DL — HIGH (ref 70–99)
GLUCOSE BLDC GLUCOMTR-MCNC: 140 MG/DL — HIGH (ref 70–99)
GLUCOSE BLDC GLUCOMTR-MCNC: 142 MG/DL — HIGH (ref 70–99)
GLUCOSE BLDC GLUCOMTR-MCNC: 146 MG/DL — HIGH (ref 70–99)
GLUCOSE BLDC GLUCOMTR-MCNC: 148 MG/DL — HIGH (ref 70–99)
GLUCOSE BLDC GLUCOMTR-MCNC: 155 MG/DL — HIGH (ref 70–99)
GLUCOSE BLDC GLUCOMTR-MCNC: 158 MG/DL — HIGH (ref 70–99)
GLUCOSE BLDC GLUCOMTR-MCNC: 162 MG/DL — HIGH (ref 70–99)
GLUCOSE BLDC GLUCOMTR-MCNC: 167 MG/DL — HIGH (ref 70–99)
GLUCOSE BLDC GLUCOMTR-MCNC: 174 MG/DL — HIGH (ref 70–99)
GLUCOSE BLDC GLUCOMTR-MCNC: 179 MG/DL — HIGH (ref 70–99)
GLUCOSE BLDC GLUCOMTR-MCNC: 184 MG/DL — HIGH (ref 70–99)
GLUCOSE BLDC GLUCOMTR-MCNC: 239 MG/DL — HIGH (ref 70–99)
GLUCOSE BLDC GLUCOMTR-MCNC: 93 MG/DL — SIGNIFICANT CHANGE UP (ref 70–99)
GLUCOSE BLDC GLUCOMTR-MCNC: 94 MG/DL — SIGNIFICANT CHANGE UP (ref 70–99)
GLUCOSE BLDC GLUCOMTR-MCNC: 95 MG/DL — SIGNIFICANT CHANGE UP (ref 70–99)
GLUCOSE BLDC GLUCOMTR-MCNC: 98 MG/DL — SIGNIFICANT CHANGE UP (ref 70–99)
GLUCOSE SERPL-MCNC: 106 MG/DL — HIGH (ref 70–99)
HCO3 BLDV-SCNC: 24 MMOL/L — SIGNIFICANT CHANGE UP (ref 22–29)
HCO3 BLDV-SCNC: 25 MMOL/L — SIGNIFICANT CHANGE UP (ref 22–29)
HCO3 BLDV-SCNC: 29 MMOL/L — SIGNIFICANT CHANGE UP (ref 22–29)
HCT VFR BLD CALC: 30 % — LOW (ref 39–50)
HGB BLD-MCNC: 9.9 G/DL — LOW (ref 13–17)
HOROWITZ INDEX BLDV+IHG-RTO: 32 — SIGNIFICANT CHANGE UP
HOROWITZ INDEX BLDV+IHG-RTO: 36 — SIGNIFICANT CHANGE UP
HOROWITZ INDEX BLDV+IHG-RTO: 36 — SIGNIFICANT CHANGE UP
INR BLD: 1.32 RATIO — HIGH (ref 0.88–1.16)
MAGNESIUM SERPL-MCNC: 2.4 MG/DL — SIGNIFICANT CHANGE UP (ref 1.6–2.6)
MCHC RBC-ENTMCNC: 27 PG — SIGNIFICANT CHANGE UP (ref 27–34)
MCHC RBC-ENTMCNC: 33 GM/DL — SIGNIFICANT CHANGE UP (ref 32–36)
MCV RBC AUTO: 81.7 FL — SIGNIFICANT CHANGE UP (ref 80–100)
NRBC # BLD: 0 /100 WBCS — SIGNIFICANT CHANGE UP (ref 0–0)
PCO2 BLDV: 40 MMHG — LOW (ref 42–55)
PCO2 BLDV: 43 MMHG — SIGNIFICANT CHANGE UP (ref 42–55)
PCO2 BLDV: 43 MMHG — SIGNIFICANT CHANGE UP (ref 42–55)
PCO2 BLDV: 47 MMHG — SIGNIFICANT CHANGE UP (ref 42–55)
PCO2 BLDV: 47 MMHG — SIGNIFICANT CHANGE UP (ref 42–55)
PCO2 BLDV: 49 MMHG — SIGNIFICANT CHANGE UP (ref 42–55)
PH BLDV: 7.33 — SIGNIFICANT CHANGE UP (ref 7.32–7.43)
PH BLDV: 7.34 — SIGNIFICANT CHANGE UP (ref 7.32–7.43)
PH BLDV: 7.38 — SIGNIFICANT CHANGE UP (ref 7.32–7.43)
PHOSPHATE SERPL-MCNC: 2.9 MG/DL — SIGNIFICANT CHANGE UP (ref 2.5–4.5)
PLATELET # BLD AUTO: 126 K/UL — LOW (ref 150–400)
PO2 BLDV: 38 MMHG — SIGNIFICANT CHANGE UP (ref 25–45)
PO2 BLDV: 39 MMHG — SIGNIFICANT CHANGE UP (ref 25–45)
PO2 BLDV: 39 MMHG — SIGNIFICANT CHANGE UP (ref 25–45)
PO2 BLDV: 40 MMHG — SIGNIFICANT CHANGE UP (ref 25–45)
PO2 BLDV: 41 MMHG — SIGNIFICANT CHANGE UP (ref 25–45)
PO2 BLDV: 43 MMHG — SIGNIFICANT CHANGE UP (ref 25–45)
POTASSIUM SERPL-MCNC: 3.9 MMOL/L — SIGNIFICANT CHANGE UP (ref 3.5–5.3)
POTASSIUM SERPL-SCNC: 3.9 MMOL/L — SIGNIFICANT CHANGE UP (ref 3.5–5.3)
PROT SERPL-MCNC: 6.1 G/DL — SIGNIFICANT CHANGE UP (ref 6–8.3)
PROTHROM AB SERPL-ACNC: 15.4 SEC — HIGH (ref 10.5–13.4)
RBC # BLD: 3.67 M/UL — LOW (ref 4.2–5.8)
RBC # FLD: 15.3 % — HIGH (ref 10.3–14.5)
SAO2 % BLDV: 56.8 % — LOW (ref 67–88)
SAO2 % BLDV: 61 % — LOW (ref 67–88)
SAO2 % BLDV: 61.5 % — LOW (ref 67–88)
SAO2 % BLDV: 62.7 % — LOW (ref 67–88)
SAO2 % BLDV: 66.3 % — LOW (ref 67–88)
SAO2 % BLDV: 66.9 % — LOW (ref 67–88)
SODIUM SERPL-SCNC: 141 MMOL/L — SIGNIFICANT CHANGE UP (ref 135–145)
WBC # BLD: 13.58 K/UL — HIGH (ref 3.8–10.5)
WBC # FLD AUTO: 13.58 K/UL — HIGH (ref 3.8–10.5)

## 2022-06-24 PROCEDURE — 93010 ELECTROCARDIOGRAM REPORT: CPT

## 2022-06-24 PROCEDURE — 99291 CRITICAL CARE FIRST HOUR: CPT

## 2022-06-24 PROCEDURE — 71045 X-RAY EXAM CHEST 1 VIEW: CPT | Mod: 26

## 2022-06-24 RX ORDER — POTASSIUM CHLORIDE 20 MEQ
10 PACKET (EA) ORAL
Refills: 0 | Status: COMPLETED | OUTPATIENT
Start: 2022-06-24 | End: 2022-06-24

## 2022-06-24 RX ORDER — HYDRALAZINE HCL 50 MG
5 TABLET ORAL ONCE
Refills: 0 | Status: COMPLETED | OUTPATIENT
Start: 2022-06-24 | End: 2022-06-24

## 2022-06-24 RX ORDER — SPIRONOLACTONE 25 MG/1
25 TABLET, FILM COATED ORAL DAILY
Refills: 0 | Status: DISCONTINUED | OUTPATIENT
Start: 2022-06-24 | End: 2022-06-24

## 2022-06-24 RX ORDER — HYDROMORPHONE HYDROCHLORIDE 2 MG/ML
0.5 INJECTION INTRAMUSCULAR; INTRAVENOUS; SUBCUTANEOUS ONCE
Refills: 0 | Status: DISCONTINUED | OUTPATIENT
Start: 2022-06-24 | End: 2022-06-24

## 2022-06-24 RX ORDER — ENOXAPARIN SODIUM 100 MG/ML
40 INJECTION SUBCUTANEOUS EVERY 24 HOURS
Refills: 0 | Status: DISCONTINUED | OUTPATIENT
Start: 2022-06-24 | End: 2022-06-26

## 2022-06-24 RX ORDER — ACETAMINOPHEN 500 MG
1000 TABLET ORAL ONCE
Refills: 0 | Status: COMPLETED | OUTPATIENT
Start: 2022-06-24 | End: 2022-06-24

## 2022-06-24 RX ORDER — MILRINONE LACTATE 1 MG/ML
0.2 INJECTION, SOLUTION INTRAVENOUS
Qty: 20 | Refills: 0 | Status: DISCONTINUED | OUTPATIENT
Start: 2022-06-24 | End: 2022-06-26

## 2022-06-24 RX ORDER — FUROSEMIDE 40 MG
40 TABLET ORAL ONCE
Refills: 0 | Status: COMPLETED | OUTPATIENT
Start: 2022-06-24 | End: 2022-06-24

## 2022-06-24 RX ORDER — FUROSEMIDE 40 MG
20 TABLET ORAL DAILY
Refills: 0 | Status: DISCONTINUED | OUTPATIENT
Start: 2022-06-25 | End: 2022-06-27

## 2022-06-24 RX ORDER — NICARDIPINE HYDROCHLORIDE 30 MG/1
5 CAPSULE, EXTENDED RELEASE ORAL
Qty: 40 | Refills: 0 | Status: DISCONTINUED | OUTPATIENT
Start: 2022-06-24 | End: 2022-06-24

## 2022-06-24 RX ORDER — FUROSEMIDE 40 MG
20 TABLET ORAL DAILY
Refills: 0 | Status: DISCONTINUED | OUTPATIENT
Start: 2022-06-24 | End: 2022-06-24

## 2022-06-24 RX ORDER — HYDRALAZINE HCL 50 MG
10 TABLET ORAL ONCE
Refills: 0 | Status: DISCONTINUED | OUTPATIENT
Start: 2022-06-24 | End: 2022-06-24

## 2022-06-24 RX ORDER — ALBUMIN HUMAN 25 %
250 VIAL (ML) INTRAVENOUS ONCE
Refills: 0 | Status: COMPLETED | OUTPATIENT
Start: 2022-06-24 | End: 2022-06-24

## 2022-06-24 RX ORDER — SPIRONOLACTONE 25 MG/1
25 TABLET, FILM COATED ORAL DAILY
Refills: 0 | Status: DISCONTINUED | OUTPATIENT
Start: 2022-06-25 | End: 2022-06-29

## 2022-06-24 RX ADMIN — GABAPENTIN 100 MILLIGRAM(S): 400 CAPSULE ORAL at 21:34

## 2022-06-24 RX ADMIN — GABAPENTIN 100 MILLIGRAM(S): 400 CAPSULE ORAL at 05:33

## 2022-06-24 RX ADMIN — Medication 100 MILLIEQUIVALENT(S): at 06:45

## 2022-06-24 RX ADMIN — Medication 5 MILLIGRAM(S): at 11:02

## 2022-06-24 RX ADMIN — GABAPENTIN 100 MILLIGRAM(S): 400 CAPSULE ORAL at 13:15

## 2022-06-24 RX ADMIN — Medication 650 MILLIGRAM(S): at 17:43

## 2022-06-24 RX ADMIN — Medication 40 MILLIGRAM(S): at 14:53

## 2022-06-24 RX ADMIN — ENOXAPARIN SODIUM 40 MILLIGRAM(S): 100 INJECTION SUBCUTANEOUS at 13:19

## 2022-06-24 RX ADMIN — Medication 650 MILLIGRAM(S): at 00:43

## 2022-06-24 RX ADMIN — Medication 100 MILLIGRAM(S): at 08:48

## 2022-06-24 RX ADMIN — Medication 100 MILLIEQUIVALENT(S): at 08:03

## 2022-06-24 RX ADMIN — Medication 650 MILLIGRAM(S): at 05:33

## 2022-06-24 RX ADMIN — AMIODARONE HYDROCHLORIDE 400 MILLIGRAM(S): 400 TABLET ORAL at 05:04

## 2022-06-24 RX ADMIN — MUPIROCIN 1 APPLICATION(S): 20 OINTMENT TOPICAL at 17:43

## 2022-06-24 RX ADMIN — HYDROMORPHONE HYDROCHLORIDE 0.5 MILLIGRAM(S): 2 INJECTION INTRAMUSCULAR; INTRAVENOUS; SUBCUTANEOUS at 01:00

## 2022-06-24 RX ADMIN — INSULIN HUMAN 3 UNIT(S)/HR: 100 INJECTION, SOLUTION SUBCUTANEOUS at 20:48

## 2022-06-24 RX ADMIN — PANTOPRAZOLE SODIUM 40 MILLIGRAM(S): 20 TABLET, DELAYED RELEASE ORAL at 13:14

## 2022-06-24 RX ADMIN — OXYCODONE HYDROCHLORIDE 10 MILLIGRAM(S): 5 TABLET ORAL at 21:35

## 2022-06-24 RX ADMIN — AMIODARONE HYDROCHLORIDE 400 MILLIGRAM(S): 400 TABLET ORAL at 17:44

## 2022-06-24 RX ADMIN — Medication 1000 MILLIGRAM(S): at 09:59

## 2022-06-24 RX ADMIN — Medication 100 MILLIEQUIVALENT(S): at 08:56

## 2022-06-24 RX ADMIN — SENNA PLUS 2 TABLET(S): 8.6 TABLET ORAL at 21:35

## 2022-06-24 RX ADMIN — Medication 81 MILLIGRAM(S): at 13:15

## 2022-06-24 RX ADMIN — MILRINONE LACTATE 3.96 MICROGRAM(S)/KG/MIN: 1 INJECTION, SOLUTION INTRAVENOUS at 20:48

## 2022-06-24 RX ADMIN — HYDROMORPHONE HYDROCHLORIDE 0.5 MILLIGRAM(S): 2 INJECTION INTRAMUSCULAR; INTRAVENOUS; SUBCUTANEOUS at 03:59

## 2022-06-24 RX ADMIN — OXYCODONE HYDROCHLORIDE 10 MILLIGRAM(S): 5 TABLET ORAL at 11:38

## 2022-06-24 RX ADMIN — Medication 650 MILLIGRAM(S): at 00:21

## 2022-06-24 RX ADMIN — CHLORHEXIDINE GLUCONATE 1 APPLICATION(S): 213 SOLUTION TOPICAL at 13:28

## 2022-06-24 RX ADMIN — Medication 4.95 MICROGRAM(S)/KG/MIN: at 20:48

## 2022-06-24 RX ADMIN — Medication 100 MILLIGRAM(S): at 17:43

## 2022-06-24 RX ADMIN — OXYCODONE HYDROCHLORIDE 10 MILLIGRAM(S): 5 TABLET ORAL at 12:08

## 2022-06-24 RX ADMIN — Medication 40 MILLIGRAM(S): at 09:29

## 2022-06-24 RX ADMIN — MILRINONE LACTATE 3.96 MICROGRAM(S)/KG/MIN: 1 INJECTION, SOLUTION INTRAVENOUS at 11:14

## 2022-06-24 RX ADMIN — Medication 125 MILLILITER(S): at 18:30

## 2022-06-24 RX ADMIN — Medication 5 MILLIGRAM(S): at 16:15

## 2022-06-24 RX ADMIN — HYDROMORPHONE HYDROCHLORIDE 0.5 MILLIGRAM(S): 2 INJECTION INTRAMUSCULAR; INTRAVENOUS; SUBCUTANEOUS at 04:15

## 2022-06-24 RX ADMIN — Medication 500 MILLIGRAM(S): at 17:43

## 2022-06-24 RX ADMIN — MUPIROCIN 1 APPLICATION(S): 20 OINTMENT TOPICAL at 05:04

## 2022-06-24 RX ADMIN — POLYETHYLENE GLYCOL 3350 17 GRAM(S): 17 POWDER, FOR SOLUTION ORAL at 13:14

## 2022-06-24 RX ADMIN — Medication 500 MILLIGRAM(S): at 05:04

## 2022-06-24 RX ADMIN — Medication 100 MILLIGRAM(S): at 00:05

## 2022-06-24 RX ADMIN — Medication 400 MILLIGRAM(S): at 09:29

## 2022-06-24 RX ADMIN — HYDROMORPHONE HYDROCHLORIDE 0.5 MILLIGRAM(S): 2 INJECTION INTRAMUSCULAR; INTRAVENOUS; SUBCUTANEOUS at 01:15

## 2022-06-24 RX ADMIN — OXYCODONE HYDROCHLORIDE 10 MILLIGRAM(S): 5 TABLET ORAL at 20:49

## 2022-06-24 RX ADMIN — Medication 650 MILLIGRAM(S): at 05:03

## 2022-06-24 NOTE — DIETITIAN INITIAL EVALUATION ADULT - PERTINENT MEDS FT
MEDICATIONS  (STANDING):  acetaminophen     Tablet .. 650 milliGRAM(s) Oral every 6 hours  aMIOdarone    Tablet 400 milliGRAM(s) Oral two times a day  ascorbic acid 500 milliGRAM(s) Oral two times a day  aspirin enteric coated 81 milliGRAM(s) Oral daily  cefuroxime  IVPB 1500 milliGRAM(s) IV Intermittent every 8 hours  chlorhexidine 2% Cloths 1 Application(s) Topical daily  dextrose 50% Injectable 50 milliLiter(s) IV Push every 15 minutes  DOBUTamine Infusion 2.5 MICROgram(s)/kG/Min (4.95 mL/Hr) IV Continuous <Continuous>  enoxaparin Injectable 40 milliGRAM(s) SubCutaneous every 24 hours  furosemide   Injectable 40 milliGRAM(s) IV Push once  gabapentin 100 milliGRAM(s) Oral every 8 hours  insulin regular Infusion 3 Unit(s)/Hr (3 mL/Hr) IV Continuous <Continuous>  milrinone Infusion 0.2 MICROgram(s)/kG/Min (3.96 mL/Hr) IV Continuous <Continuous>  mupirocin 2% Ointment 1 Application(s) Both Nostrils two times a day  pantoprazole  Injectable 40 milliGRAM(s) IV Push daily  polyethylene glycol 3350 17 Gram(s) Oral daily  potassium chloride  10 mEq/50 mL IVPB 10 milliEquivalent(s) IV Intermittent every 1 hour  potassium chloride  10 mEq/50 mL IVPB 10 milliEquivalent(s) IV Intermittent every 1 hour  potassium chloride  10 mEq/50 mL IVPB 10 milliEquivalent(s) IV Intermittent every 1 hour  senna 2 Tablet(s) Oral at bedtime  sodium chloride 0.9%. 1000 milliLiter(s) (10 mL/Hr) IV Continuous <Continuous>    MEDICATIONS  (PRN):  HYDROmorphone  Injectable 0.5 milliGRAM(s) IV Push every 6 hours PRN Severe Pain (7 - 10)  oxyCODONE    IR 5 milliGRAM(s) Oral every 4 hours PRN Moderate Pain (4 - 6)  oxyCODONE    IR 10 milliGRAM(s) Oral every 4 hours PRN Severe Pain (7 - 10)

## 2022-06-24 NOTE — PHYSICAL THERAPY INITIAL EVALUATION ADULT - PERTINENT HX OF CURRENT PROBLEM, REHAB EVAL
68 yr old M DM2 / HTN / HLD / CAD S/P PCI w multiple stents / A-Fib S/P NSTEMI / Cath multivessel CAD / moderate LV Systolic dysfunction EF 40% / Severe MR patient now POD#1 s/p  S/P CABG X2 / MV repair.

## 2022-06-24 NOTE — PHYSICAL THERAPY INITIAL EVALUATION ADULT - PLANNED THERAPY INTERVENTIONS, PT EVAL
stair negotiation GOAL: Patient will negotiate up / down 5 steps independently in 2 weeks/balance training/bed mobility training/gait training/transfer training

## 2022-06-24 NOTE — DIETITIAN INITIAL EVALUATION ADULT - ENERGY INTAKE
Pt reports current decreased appetite and PO intake. Of note, patient noted with good PO intake prior to CTU admission (6/23) per nursing flowsheets.

## 2022-06-24 NOTE — DIETITIAN INITIAL EVALUATION ADULT - EDUCATION DIETARY MODIFICATIONS
Encouraged PO intake as tolerated with emphasis on protein as able. Discussed increased nutrient needs in setting of recent procedure. Education provided on stress-induced hyperglycemia and discussed consistent carbohydrate diet. Pt amenable to receiving Glucerna supplements in-house to optimize calorie and protein intake. Reinforced heart healthy, consistent carbohydrate diet education patient provided. Pt amenable to recommendations, made aware RD remains available./teach back/(2) meets goals/outcomes/verbalization

## 2022-06-24 NOTE — DIETITIAN INITIAL EVALUATION ADULT - REASON FOR ADMISSION
Pt is a 80 yo male with PMH of CAD s/p stent, ischemic cardiomyopathy with mild LV dysfunction, paroxysmal afib, HTN, DM2 who presented with acute R-sided chest pressure. Admitted 6/17. S/p NSTEMI/Cath multivessel CAD/moderate LV systolic dysfunction/Severe MR. S/p CABG x2/MV repair yesterday (POD#1). Post-op multifactorial hypotension related to hypovolemia and cardiac dysfunction.

## 2022-06-24 NOTE — DIETITIAN INITIAL EVALUATION ADULT - ORAL INTAKE PTA/DIET HISTORY
Pt reports good appetite and PO intake PTA, though notes he "eats light." Diet recall includes oatmeal, eggs, whole wheat bread, cereal. Pt reports limiting intake of acidic foods in setting of reported acid reflux (lemon, tomato). Confirmed NKFA.

## 2022-06-24 NOTE — DIETITIAN INITIAL EVALUATION ADULT - NSFNSADHERENCEPTAFT_GEN_A_CORE
Pt notes awareness of nutrition recommendations for heart health and endorses consuming whole grains, fruits, and vegetables as well as lean protein. Pt reports checking his glucose fingersticks at home with values typically 112-129 mg/dL. Reports he has taken metformin in the past however stopped per doctor's order in setting of chest pain. Denies utilizing any other medications for glycemic control. HbA1c 7.2% suggests good glycemic control.

## 2022-06-24 NOTE — PROGRESS NOTE ADULT - SUBJECTIVE AND OBJECTIVE BOX
DATE OF SERVICE: 06-24-22 @ 15:12    Patient is a 81y old  Male who presents with a chief complaint of Pt is a 82 yo male with PMH of CAD s/p stent, ischemic cardiomyopathy with mild LV dysfunction, paroxysmal afib, HTN, DM2 who presented with acute R-sided chest pressure. Admitted 6/17. S/p NSTEMI/Cath multivessel CAD/moderate LV systolic dysfunction/Severe MR. S/p CABG x2/MV repair yesterday (POD#1). Post-op multifactorial hypotension related to hypovolemia and cardiac dysfunction.      (24 Jun 2022 14:45)      INTERVAL HISTORY: Feels ok.     REVIEW OF SYSTEMS:  CONSTITUTIONAL: No weakness  EYES/ENT: No visual changes;  No throat pain   NECK: No pain or stiffness  RESPIRATORY: No cough, wheezing; No shortness of breath  CARDIOVASCULAR: No chest pain or palpitations  GASTROINTESTINAL: No abdominal  pain. No nausea, vomiting, or hematemesis  GENITOURINARY: No dysuria, frequency or hematuria  NEUROLOGICAL: No stroke like symptoms  SKIN: No rashes    TELEMETRY Personally reviewed: SR 80s  	  MEDICATIONS:  aMIOdarone    Tablet 400 milliGRAM(s) Oral two times a day  DOBUTamine Infusion 2.5 MICROgram(s)/kG/Min IV Continuous <Continuous>  milrinone Infusion 0.2 MICROgram(s)/kG/Min IV Continuous <Continuous>        PHYSICAL EXAM:  T(C): 37.1 (06-24-22 @ 12:00), Max: 37.1 (06-24-22 @ 12:00)  HR: 82 (06-24-22 @ 15:00) (75 - 86)  BP: 152/81 (06-24-22 @ 13:00) (114/81 - 152/81)  RR: 20 (06-24-22 @ 15:00) (9 - 26)  SpO2: 100% (06-24-22 @ 15:00) (100% - 100%)  Wt(kg): --  I&O's Summary    23 Jun 2022 07:01  -  24 Jun 2022 07:00  --------------------------------------------------------  IN: 3011.2 mL / OUT: 2580 mL / NET: 431.2 mL    24 Jun 2022 07:01  -  24 Jun 2022 15:12  --------------------------------------------------------  IN: 573 mL / OUT: 1135 mL / NET: -562 mL          Appearance: In no distress	  HEENT:    PERRL, EOMI	  Cardiovascular:  S1 S2, No JVD  Respiratory: Lungs clear to auscultation	  Gastrointestinal:  Soft, Non-tender, + BS	  Vascularature:  No edema of LE  Psychiatric: Appropriate affect   Neuro: no acute focal deficits                               9.9    13.58 )-----------( 126      ( 24 Jun 2022 00:29 )             30.0     06-24    141  |  105  |  16  ----------------------------<  106<H>  3.9   |  23  |  0.75    Ca    9.0      24 Jun 2022 00:30  Phos  2.9     06-24  Mg     2.4     06-24    TPro  6.1  /  Alb  3.9  /  TBili  1.0  /  DBili  x   /  AST  65<H>  /  ALT  22  /  AlkPhos  59  06-24        Labs personally reviewed      ASSESSMENT/PLAN: 	    82 yo male with cad s/p stent x2 on plavix, ischemic cardiomyopathy w/ mild LV dysfunction, HLD, CHF, KIMMY, paroxysmal afib (not on ac due to bleed in the past?), HTN, DM2 presents to the ER with acute R sided chest pressure starting around 11AM today. Admitted for further evaluation due to elevated cardiac enzymes.    Pt s/p cardiac catherization with evidence of CAD with left main disease. PT transferred to [facility] for surgical evaluation with Dr. Perez. Labs and imaging to be reviewed with Dr. Perez.     Problem/Recommendation - 1:  ·  Problem; CAD   - EKG noted   - s/p LHC showing CAD of left main   - TTE shows EF 45%  and Severe mitral regurgitation with an eccentric, anteriorly directed jet.  Mild to moderate segmental left ventricular systolic dysfunction.  Hypokinesis of the lateral, inferolateral, and basal inferior walls  - Post op CABG and MV annuloplaty  - c/w ASA 81, Atorvastatin 80, Coreg 6.25 BID and  Imdur 30mg QD     Problem/Recommendation - 2:  ·  Problem; :Systolic HF (heart failure).   - TTE as noted above   - c/w lasix   - on Imdur, Metoprolol, coreg, spironolactone on hold post ip  - Monitor BP   - Currently on Dobutamine, Milrinone gtt      Problem/Recommendation - 3:  ·  Problem; HTN   - hypotensive post op  - currently on inotropes for BP support      Problem/Recommendation - 4:  ·  Problem: DM   - ISS       Problem/Recommendation - 5:  ·  Problem: HLD   - c/w statin           Beba Arias, AG-NP   Prasanna Scott DO [facility]  Cardiovascular Medicine  [address] [place] [address], [address]  Office: [phone]  Cell: [phone]

## 2022-06-24 NOTE — PROGRESS NOTE ADULT - SUBJECTIVE AND OBJECTIVE BOX
CRITICAL CARE ATTENDING - CTICU    MEDICATIONS  (STANDING):  acetaminophen     Tablet .. 650 milliGRAM(s) Oral every 6 hours  aMIOdarone    Tablet 400 milliGRAM(s) Oral two times a day  ascorbic acid 500 milliGRAM(s) Oral two times a day  aspirin enteric coated 81 milliGRAM(s) Oral daily  cefuroxime  IVPB 1500 milliGRAM(s) IV Intermittent every 8 hours  chlorhexidine 2% Cloths 1 Application(s) Topical daily  dextrose 50% Injectable 50 milliLiter(s) IV Push every 15 minutes  DOBUTamine Infusion 3 MICROgram(s)/kG/Min (5.94 mL/Hr) IV Continuous <Continuous>  gabapentin 100 milliGRAM(s) Oral every 8 hours  insulin regular Infusion 3 Unit(s)/Hr (3 mL/Hr) IV Continuous <Continuous>  mupirocin 2% Ointment 1 Application(s) Both Nostrils two times a day  pantoprazole  Injectable 40 milliGRAM(s) IV Push daily  polyethylene glycol 3350 17 Gram(s) Oral daily  potassium chloride  10 mEq/50 mL IVPB 10 milliEquivalent(s) IV Intermittent every 1 hour  potassium chloride  10 mEq/50 mL IVPB 10 milliEquivalent(s) IV Intermittent every 1 hour  potassium chloride  10 mEq/50 mL IVPB 10 milliEquivalent(s) IV Intermittent every 1 hour  senna 2 Tablet(s) Oral at bedtime  sodium chloride 0.9%. 1000 milliLiter(s) (10 mL/Hr) IV Continuous <Continuous>                                    9.9    13.58 )-----------( 126      ( 2022 00:29 )             30.0       06-24    141  |  105  |  16  ----------------------------<  106<H>  3.9   |  23  |  0.75    Ca    9.0      2022 00:30  Phos  2.9     06-24  Mg     2.4     06-24    TPro  6.1  /  Alb  3.9  /  TBili  1.0  /  DBili  x   /  AST  65<H>  /  ALT  22  /  AlkPhos  59  06-24      PT/INR - ( 2022 00:29 )   PT: 15.4 sec;   INR: 1.32 ratio         PTT - ( 2022 00:29 )  PTT:32.0 sec        Daily     Daily Weight in k.1 (2022 00:00)       @ 07:  -   @ 07:00  --------------------------------------------------------  IN: 800 mL / OUT: 1700 mL / NET: -900 mL     @ 07:  -   @ 06:44  --------------------------------------------------------  IN: 2993.2 mL / OUT: 2555 mL / NET: 438.2 mL        Critically Ill patient  : [ ] preoperative ,   [x] post operative    Requires :  [x ] Arterial Line   [x] Central Line  [ ] PA catheter  [ ] IABP  [ ] ECMO  [ ] LVAD  [ ] Ventilator  [x] pacemaker- TPM [ ] Impella.                      [x ] ABG's     [ x] Pulse Oxymetry Monitoring  Bedside evaluation , monitoring , treatment of hemodynamics , fluids , IVP/ IVCD meds.        Diagnosis:     POD1 - C2L/MVr    Temporary pacemaker (TPM) interrogation and setting.     Chest tube drainage     Requires chest PT, pulmonary toilet, suctioning to maintain SaO2,  patent airway and treat atelectasis.     CHF- acute [ x]   chronic [ x]    systolic [ x]   diatolic [ ]          - Echo- EF - 45-50             [ ] RV dysfunction          - Cxr-cardiomegally, edema          - Clinical-  [x]inotropes   [ ]pressors   [ ]diuresis   [ ]IABP   [ ]ECMO   [ ]LVAD   [ ]Respiratory Failure.     Hemodynamic lability,  instability. Requires IVCD [ ] vasopressors [x ] inotropes  [ ] vasodilator  [x ]IVSS fluid  to maintain MAP, perfusion, C.I.     IVCD Insulin - DM2    Hypovolemia    Thrombocytopenia     Requires bedside physical therapy, mobilization and total [facility] care.         By signing my name below, I, Alondra Lawrence, attest that this documentation has been prepared under the direction and in the presence of Barry Tyrel, MD.   Electronically Signed: Mini Byrne 06-24-22 @ 06:44      Discussed with CT surgeon, Physician Assistant - Nurse Practitioner- Critical care medicine team.   Discussed at  AM / PM rounds.   Chart, labs , films reviewed.    Cumulative Critical Care Time Given Today:x CRITICAL CARE ATTENDING - CTICU    MEDICATIONS  (STANDING):  acetaminophen     Tablet .. 650 milliGRAM(s) Oral every 6 hours  aMIOdarone    Tablet 400 milliGRAM(s) Oral two times a day  ascorbic acid 500 milliGRAM(s) Oral two times a day  aspirin enteric coated 81 milliGRAM(s) Oral daily  cefuroxime  IVPB 1500 milliGRAM(s) IV Intermittent every 8 hours  chlorhexidine 2% Cloths 1 Application(s) Topical daily  dextrose 50% Injectable 50 milliLiter(s) IV Push every 15 minutes  DOBUTamine Infusion 3 MICROgram(s)/kG/Min (5.94 mL/Hr) IV Continuous <Continuous>  gabapentin 100 milliGRAM(s) Oral every 8 hours  insulin regular Infusion 3 Unit(s)/Hr (3 mL/Hr) IV Continuous <Continuous>  mupirocin 2% Ointment 1 Application(s) Both Nostrils two times a day  pantoprazole  Injectable 40 milliGRAM(s) IV Push daily  polyethylene glycol 3350 17 Gram(s) Oral daily  potassium chloride  10 mEq/50 mL IVPB 10 milliEquivalent(s) IV Intermittent every 1 hour  potassium chloride  10 mEq/50 mL IVPB 10 milliEquivalent(s) IV Intermittent every 1 hour  potassium chloride  10 mEq/50 mL IVPB 10 milliEquivalent(s) IV Intermittent every 1 hour  senna 2 Tablet(s) Oral at bedtime  sodium chloride 0.9%. 1000 milliLiter(s) (10 mL/Hr) IV Continuous <Continuous>                                    9.9    13.58 )-----------( 126      ( 2022 00:29 )             30.0       06-24    141  |  105  |  16  ----------------------------<  106<H>  3.9   |  23  |  0.75    Ca    9.0      2022 00:30  Phos  2.9     06-24  Mg     2.4     06-24    TPro  6.1  /  Alb  3.9  /  TBili  1.0  /  DBili  x   /  AST  65<H>  /  ALT  22  /  AlkPhos  59  06-24      PT/INR - ( 2022 00:29 )   PT: 15.4 sec;   INR: 1.32 ratio         PTT - ( 2022 00:29 )  PTT:32.0 sec        Daily     Daily Weight in k.1 (2022 00:00)       @ 07:  -   @ 07:00  --------------------------------------------------------  IN: 800 mL / OUT: 1700 mL / NET: -900 mL     @ 07:01  -   @ 06:44  --------------------------------------------------------  IN: 2993.2 mL / OUT: 2555 mL / NET: 438.2 mL        Critically Ill patient  : [ ] preoperative ,   [x] post operative    Requires :  [x ] Arterial Line   [x] Central Line  [ ] PA catheter  [ ] IABP  [ ] ECMO  [ ] LVAD  [ ] Ventilator  [x] pacemaker- TPM [ ] Impella.                      [x ] ABG's     [ x] Pulse Oxymetry Monitoring  Bedside evaluation , monitoring , treatment of hemodynamics , fluids , IVP/ IVCD meds.        Diagnosis:     POD1 - C2L/MVr    Temporary pacemaker (TPM) interrogation and setting.     Chest tube drainage     Requires chest PT, pulmonary toilet, suctioning to maintain SaO2,  patent airway and treat atelectasis.     CHF- acute [ x]   chronic [ x]    systolic [ x]   diatolic [ ]          - Echo- EF - 45-50             [ ] RV dysfunction          - Cxr-cardiomegally, edema          - Clinical-  [x]inotropes   [ ]pressors   [x ]diuresis   [ ]IABP   [ ]ECMO   [ ]LVAD   [ ]Respiratory Failure.     Hemodynamic lability,  instability. Requires IVCD [ ] vasopressors [x ] inotropes  [ ] vasodilator  [x ]IVSS fluid  to maintain MAP, perfusion, C.I.     IVCD Insulin - DM2    Hypovolemia    Thrombocytopenia     Requires bedside physical therapy, mobilization and total [facility] care.         By signing my name below, I, Alondra Lawrence, attest that this documentation has been prepared under the direction and in the presence of Barry Tyrel, MD.   Electronically Signed: Mini Byrne - @ 06:44    I, Barry Sarah, personally performed the services described in this documentation. All medical record entries made by the scribe were at my direction and in my presence. I have reviewed the chart and agree that the record reflects my personal performance and is accurate and complete.   Barry Sarah MD.       Discussed with CT surgeon, Physician Assistant - Nurse Practitioner- Critical care medicine team.   Discussed at  AM / PM rounds.   Chart, labs , films reviewed.    Cumulative Critical Care Time Given Today:  30 min

## 2022-06-24 NOTE — DIETITIAN INITIAL EVALUATION ADULT - ADD RECOMMEND
1) Add multivitamin if no medical contraindications, continue vitamin C as able  2) Monitor PO intake, weight, labs, skin, GI status, diet

## 2022-06-24 NOTE — DIETITIAN INITIAL EVALUATION ADULT - REASON
Deferred at this time as patient receiving nursing care  No overt signs of muscle or fat wasting observed

## 2022-06-24 NOTE — PHYSICAL THERAPY INITIAL EVALUATION ADULT - ADDITIONAL COMMENTS
Patient lives in private home with spouse, 4 steps to enter, first floor set up, patient independent with ADLs/IADLs, ambulates without assistive device

## 2022-06-24 NOTE — PHYSICAL THERAPY INITIAL EVALUATION ADULT - GENERAL OBSERVATIONS, REHAB EVAL
Patient received sitting in chair in CTU, NAD, VSS, +right IJ Cordis (primacor gtt), +Left radial danisha, +PIV, +garcia, +CTs x 2 to LWS, +ICU monitors, +pacing wires, +3L O2 vai NC

## 2022-06-24 NOTE — DIETITIAN INITIAL EVALUATION ADULT - PERTINENT LABORATORY DATA
06-24    141  |  105  |  16  ----------------------------<  106<H>  3.9   |  23  |  0.75    Ca    9.0      24 Jun 2022 00:30  Phos  2.9     06-24  Mg     2.4     06-24    TPro  6.1  /  Alb  3.9  /  TBili  1.0  /  DBili  x   /  AST  65<H>  /  ALT  22  /  AlkPhos  59  06-24  POCT Blood Glucose.: 117 mg/dL (06-24-22 @ 14:15)  A1C with Estimated Average Glucose Result: 7.2 % (06-17-22 @ 07:41)  A1C with Estimated Average Glucose Result: 7.2 % (06-17-22 @ 07:41)  A1C with Estimated Average Glucose Result: 7.0 % (06-16-22 @ 08:01)

## 2022-06-24 NOTE — DIETITIAN INITIAL EVALUATION ADULT - OTHER INFO
Pt reports stable UBW of ~155 pounds, notes weighing this on admission (question accuracy as dosing weight 151.6 pounds at admission). More recent dosing weight 145.5 pounds (6/23) suggests possible weight loss. Current weight 161.1 pounds (6/24). Weight fluctuations likely in setting of fluid shifts vs. bed scale discrepancies. Will continue to monitor and trend weights as able.

## 2022-06-25 LAB
ALBUMIN SERPL ELPH-MCNC: 3.8 G/DL — SIGNIFICANT CHANGE UP (ref 3.3–5)
ALP SERPL-CCNC: 69 U/L — SIGNIFICANT CHANGE UP (ref 40–120)
ALT FLD-CCNC: 31 U/L — SIGNIFICANT CHANGE UP (ref 10–45)
ANION GAP SERPL CALC-SCNC: 13 MMOL/L — SIGNIFICANT CHANGE UP (ref 5–17)
APTT BLD: 34.4 SEC — SIGNIFICANT CHANGE UP (ref 27.5–35.5)
AST SERPL-CCNC: 58 U/L — HIGH (ref 10–40)
BASE EXCESS BLDV CALC-SCNC: -2 MMOL/L — SIGNIFICANT CHANGE UP (ref -2–2)
BASE EXCESS BLDV CALC-SCNC: 0.9 MMOL/L — SIGNIFICANT CHANGE UP (ref -2–2)
BASE EXCESS BLDV CALC-SCNC: 1.7 MMOL/L — SIGNIFICANT CHANGE UP (ref -2–2)
BASE EXCESS BLDV CALC-SCNC: 2.2 MMOL/L — HIGH (ref -2–2)
BASE EXCESS BLDV CALC-SCNC: 3 MMOL/L — HIGH (ref -2–2)
BILIRUB SERPL-MCNC: 0.6 MG/DL — SIGNIFICANT CHANGE UP (ref 0.2–1.2)
BLD GP AB SCN SERPL QL: NEGATIVE — SIGNIFICANT CHANGE UP
BUN SERPL-MCNC: 23 MG/DL — SIGNIFICANT CHANGE UP (ref 7–23)
CA-I SERPL-SCNC: 1.22 MMOL/L — SIGNIFICANT CHANGE UP (ref 1.15–1.33)
CA-I SERPL-SCNC: 1.25 MMOL/L — SIGNIFICANT CHANGE UP (ref 1.15–1.33)
CALCIUM SERPL-MCNC: 9 MG/DL — SIGNIFICANT CHANGE UP (ref 8.4–10.5)
CHLORIDE BLDV-SCNC: 102 MMOL/L — SIGNIFICANT CHANGE UP (ref 96–108)
CHLORIDE BLDV-SCNC: 104 MMOL/L — SIGNIFICANT CHANGE UP (ref 96–108)
CHLORIDE SERPL-SCNC: 102 MMOL/L — SIGNIFICANT CHANGE UP (ref 96–108)
CO2 BLDV-SCNC: 24 MMOL/L — SIGNIFICANT CHANGE UP (ref 22–26)
CO2 BLDV-SCNC: 27 MMOL/L — HIGH (ref 22–26)
CO2 BLDV-SCNC: 29 MMOL/L — HIGH (ref 22–26)
CO2 BLDV-SCNC: 29 MMOL/L — HIGH (ref 22–26)
CO2 BLDV-SCNC: 30 MMOL/L — HIGH (ref 22–26)
CO2 SERPL-SCNC: 23 MMOL/L — SIGNIFICANT CHANGE UP (ref 22–31)
CREAT SERPL-MCNC: 0.88 MG/DL — SIGNIFICANT CHANGE UP (ref 0.5–1.3)
EGFR: 87 ML/MIN/1.73M2 — SIGNIFICANT CHANGE UP
GAS PNL BLDA: SIGNIFICANT CHANGE UP
GAS PNL BLDV: 131 MMOL/L — LOW (ref 136–145)
GAS PNL BLDV: 135 MMOL/L — LOW (ref 136–145)
GAS PNL BLDV: SIGNIFICANT CHANGE UP
GLUCOSE BLDC GLUCOMTR-MCNC: 109 MG/DL — HIGH (ref 70–99)
GLUCOSE BLDC GLUCOMTR-MCNC: 116 MG/DL — HIGH (ref 70–99)
GLUCOSE BLDC GLUCOMTR-MCNC: 119 MG/DL — HIGH (ref 70–99)
GLUCOSE BLDC GLUCOMTR-MCNC: 124 MG/DL — HIGH (ref 70–99)
GLUCOSE BLDC GLUCOMTR-MCNC: 126 MG/DL — HIGH (ref 70–99)
GLUCOSE BLDC GLUCOMTR-MCNC: 127 MG/DL — HIGH (ref 70–99)
GLUCOSE BLDC GLUCOMTR-MCNC: 129 MG/DL — HIGH (ref 70–99)
GLUCOSE BLDC GLUCOMTR-MCNC: 130 MG/DL — HIGH (ref 70–99)
GLUCOSE BLDC GLUCOMTR-MCNC: 133 MG/DL — HIGH (ref 70–99)
GLUCOSE BLDC GLUCOMTR-MCNC: 134 MG/DL — HIGH (ref 70–99)
GLUCOSE BLDC GLUCOMTR-MCNC: 140 MG/DL — HIGH (ref 70–99)
GLUCOSE BLDC GLUCOMTR-MCNC: 141 MG/DL — HIGH (ref 70–99)
GLUCOSE BLDC GLUCOMTR-MCNC: 147 MG/DL — HIGH (ref 70–99)
GLUCOSE BLDC GLUCOMTR-MCNC: 149 MG/DL — HIGH (ref 70–99)
GLUCOSE BLDC GLUCOMTR-MCNC: 151 MG/DL — HIGH (ref 70–99)
GLUCOSE BLDC GLUCOMTR-MCNC: 151 MG/DL — HIGH (ref 70–99)
GLUCOSE BLDC GLUCOMTR-MCNC: 160 MG/DL — HIGH (ref 70–99)
GLUCOSE BLDC GLUCOMTR-MCNC: 164 MG/DL — HIGH (ref 70–99)
GLUCOSE BLDC GLUCOMTR-MCNC: 173 MG/DL — HIGH (ref 70–99)
GLUCOSE BLDC GLUCOMTR-MCNC: 179 MG/DL — HIGH (ref 70–99)
GLUCOSE BLDC GLUCOMTR-MCNC: 200 MG/DL — HIGH (ref 70–99)
GLUCOSE BLDC GLUCOMTR-MCNC: 201 MG/DL — HIGH (ref 70–99)
GLUCOSE BLDC GLUCOMTR-MCNC: 203 MG/DL — HIGH (ref 70–99)
GLUCOSE BLDC GLUCOMTR-MCNC: 77 MG/DL — SIGNIFICANT CHANGE UP (ref 70–99)
GLUCOSE BLDV-MCNC: 131 MG/DL — HIGH (ref 70–99)
GLUCOSE BLDV-MCNC: 180 MG/DL — HIGH (ref 70–99)
GLUCOSE SERPL-MCNC: 125 MG/DL — HIGH (ref 70–99)
HCO3 BLDV-SCNC: 23 MMOL/L — SIGNIFICANT CHANGE UP (ref 22–29)
HCO3 BLDV-SCNC: 26 MMOL/L — SIGNIFICANT CHANGE UP (ref 22–29)
HCO3 BLDV-SCNC: 28 MMOL/L — SIGNIFICANT CHANGE UP (ref 22–29)
HCT VFR BLD CALC: 25.9 % — LOW (ref 39–50)
HCT VFR BLDA CALC: 29 % — LOW (ref 39–51)
HCT VFR BLDA CALC: 29 % — LOW (ref 39–51)
HGB BLD CALC-MCNC: 9.6 G/DL — LOW (ref 12.6–17.4)
HGB BLD CALC-MCNC: 9.8 G/DL — LOW (ref 12.6–17.4)
HGB BLD-MCNC: 8.5 G/DL — LOW (ref 13–17)
HOROWITZ INDEX BLDV+IHG-RTO: 28 — SIGNIFICANT CHANGE UP
INR BLD: 1.41 RATIO — HIGH (ref 0.88–1.16)
LACTATE BLDV-MCNC: 1.1 MMOL/L — SIGNIFICANT CHANGE UP (ref 0.7–2)
LACTATE BLDV-MCNC: 1.3 MMOL/L — SIGNIFICANT CHANGE UP (ref 0.7–2)
MAGNESIUM SERPL-MCNC: 2.2 MG/DL — SIGNIFICANT CHANGE UP (ref 1.6–2.6)
MCHC RBC-ENTMCNC: 27.2 PG — SIGNIFICANT CHANGE UP (ref 27–34)
MCHC RBC-ENTMCNC: 32.8 GM/DL — SIGNIFICANT CHANGE UP (ref 32–36)
MCV RBC AUTO: 82.7 FL — SIGNIFICANT CHANGE UP (ref 80–100)
NRBC # BLD: 0 /100 WBCS — SIGNIFICANT CHANGE UP (ref 0–0)
PCO2 BLDV: 40 MMHG — LOW (ref 42–55)
PCO2 BLDV: 43 MMHG — SIGNIFICANT CHANGE UP (ref 42–55)
PCO2 BLDV: 46 MMHG — SIGNIFICANT CHANGE UP (ref 42–55)
PCO2 BLDV: 47 MMHG — SIGNIFICANT CHANGE UP (ref 42–55)
PCO2 BLDV: 48 MMHG — SIGNIFICANT CHANGE UP (ref 42–55)
PH BLDV: 7.37 — SIGNIFICANT CHANGE UP (ref 7.32–7.43)
PH BLDV: 7.37 — SIGNIFICANT CHANGE UP (ref 7.32–7.43)
PH BLDV: 7.39 — SIGNIFICANT CHANGE UP (ref 7.32–7.43)
PHOSPHATE SERPL-MCNC: 2.9 MG/DL — SIGNIFICANT CHANGE UP (ref 2.5–4.5)
PLATELET # BLD AUTO: 125 K/UL — LOW (ref 150–400)
PO2 BLDV: 34 MMHG — SIGNIFICANT CHANGE UP (ref 25–45)
PO2 BLDV: 38 MMHG — SIGNIFICANT CHANGE UP (ref 25–45)
PO2 BLDV: 41 MMHG — SIGNIFICANT CHANGE UP (ref 25–45)
PO2 BLDV: 41 MMHG — SIGNIFICANT CHANGE UP (ref 25–45)
PO2 BLDV: 45 MMHG — SIGNIFICANT CHANGE UP (ref 25–45)
POTASSIUM BLDV-SCNC: 4.2 MMOL/L — SIGNIFICANT CHANGE UP (ref 3.5–5.1)
POTASSIUM BLDV-SCNC: 5.3 MMOL/L — HIGH (ref 3.5–5.1)
POTASSIUM BLDV-SCNC: 7.7 MMOL/L — CRITICAL HIGH (ref 3.5–5.1)
POTASSIUM SERPL-MCNC: 4.5 MMOL/L — SIGNIFICANT CHANGE UP (ref 3.5–5.3)
POTASSIUM SERPL-SCNC: 4.5 MMOL/L — SIGNIFICANT CHANGE UP (ref 3.5–5.3)
PROT SERPL-MCNC: 6.2 G/DL — SIGNIFICANT CHANGE UP (ref 6–8.3)
PROTHROM AB SERPL-ACNC: 16.3 SEC — HIGH (ref 10.5–13.4)
RBC # BLD: 3.13 M/UL — LOW (ref 4.2–5.8)
RBC # FLD: 15.9 % — HIGH (ref 10.3–14.5)
RH IG SCN BLD-IMP: POSITIVE — SIGNIFICANT CHANGE UP
SAO2 % BLDV: 51.4 % — LOW (ref 67–88)
SAO2 % BLDV: 59.6 % — LOW (ref 67–88)
SAO2 % BLDV: 61.4 % — LOW (ref 67–88)
SAO2 % BLDV: 67.8 % — SIGNIFICANT CHANGE UP (ref 67–88)
SAO2 % BLDV: 69.7 % — SIGNIFICANT CHANGE UP (ref 67–88)
SODIUM SERPL-SCNC: 138 MMOL/L — SIGNIFICANT CHANGE UP (ref 135–145)
WBC # BLD: 23.86 K/UL — HIGH (ref 3.8–10.5)
WBC # FLD AUTO: 23.86 K/UL — HIGH (ref 3.8–10.5)

## 2022-06-25 PROCEDURE — 71045 X-RAY EXAM CHEST 1 VIEW: CPT | Mod: 26

## 2022-06-25 PROCEDURE — 99291 CRITICAL CARE FIRST HOUR: CPT

## 2022-06-25 PROCEDURE — 71045 X-RAY EXAM CHEST 1 VIEW: CPT | Mod: 26,77

## 2022-06-25 PROCEDURE — 93010 ELECTROCARDIOGRAM REPORT: CPT

## 2022-06-25 RX ORDER — HYDRALAZINE HCL 50 MG
10 TABLET ORAL ONCE
Refills: 0 | Status: COMPLETED | OUTPATIENT
Start: 2022-06-25 | End: 2022-06-25

## 2022-06-25 RX ORDER — POTASSIUM CHLORIDE 20 MEQ
10 PACKET (EA) ORAL ONCE
Refills: 0 | Status: COMPLETED | OUTPATIENT
Start: 2022-06-25 | End: 2022-06-25

## 2022-06-25 RX ORDER — PANTOPRAZOLE SODIUM 20 MG/1
40 TABLET, DELAYED RELEASE ORAL
Refills: 0 | Status: DISCONTINUED | OUTPATIENT
Start: 2022-06-25 | End: 2022-06-29

## 2022-06-25 RX ORDER — FUROSEMIDE 40 MG
40 TABLET ORAL ONCE
Refills: 0 | Status: COMPLETED | OUTPATIENT
Start: 2022-06-25 | End: 2022-06-25

## 2022-06-25 RX ORDER — HYDRALAZINE HCL 50 MG
10 TABLET ORAL EVERY 8 HOURS
Refills: 0 | Status: DISCONTINUED | OUTPATIENT
Start: 2022-06-25 | End: 2022-06-26

## 2022-06-25 RX ORDER — METOCLOPRAMIDE HCL 10 MG
10 TABLET ORAL EVERY 8 HOURS
Refills: 0 | Status: COMPLETED | OUTPATIENT
Start: 2022-06-25 | End: 2022-06-26

## 2022-06-25 RX ADMIN — PANTOPRAZOLE SODIUM 40 MILLIGRAM(S): 20 TABLET, DELAYED RELEASE ORAL at 11:35

## 2022-06-25 RX ADMIN — Medication 10 MILLIGRAM(S): at 21:26

## 2022-06-25 RX ADMIN — MUPIROCIN 1 APPLICATION(S): 20 OINTMENT TOPICAL at 18:10

## 2022-06-25 RX ADMIN — HYDROMORPHONE HYDROCHLORIDE 0.5 MILLIGRAM(S): 2 INJECTION INTRAMUSCULAR; INTRAVENOUS; SUBCUTANEOUS at 01:19

## 2022-06-25 RX ADMIN — AMIODARONE HYDROCHLORIDE 400 MILLIGRAM(S): 400 TABLET ORAL at 18:09

## 2022-06-25 RX ADMIN — Medication 650 MILLIGRAM(S): at 11:33

## 2022-06-25 RX ADMIN — OXYCODONE HYDROCHLORIDE 10 MILLIGRAM(S): 5 TABLET ORAL at 05:58

## 2022-06-25 RX ADMIN — Medication 10 MILLIGRAM(S): at 21:27

## 2022-06-25 RX ADMIN — GABAPENTIN 100 MILLIGRAM(S): 400 CAPSULE ORAL at 21:27

## 2022-06-25 RX ADMIN — HYDROMORPHONE HYDROCHLORIDE 0.5 MILLIGRAM(S): 2 INJECTION INTRAMUSCULAR; INTRAVENOUS; SUBCUTANEOUS at 01:35

## 2022-06-25 RX ADMIN — Medication 100 MILLIGRAM(S): at 00:09

## 2022-06-25 RX ADMIN — MILRINONE LACTATE 3.96 MICROGRAM(S)/KG/MIN: 1 INJECTION, SOLUTION INTRAVENOUS at 21:27

## 2022-06-25 RX ADMIN — SPIRONOLACTONE 25 MILLIGRAM(S): 25 TABLET, FILM COATED ORAL at 05:20

## 2022-06-25 RX ADMIN — ENOXAPARIN SODIUM 40 MILLIGRAM(S): 100 INJECTION SUBCUTANEOUS at 12:22

## 2022-06-25 RX ADMIN — Medication 10 MILLIGRAM(S): at 18:09

## 2022-06-25 RX ADMIN — Medication 10 MILLIGRAM(S): at 14:23

## 2022-06-25 RX ADMIN — AMIODARONE HYDROCHLORIDE 400 MILLIGRAM(S): 400 TABLET ORAL at 05:21

## 2022-06-25 RX ADMIN — Medication 650 MILLIGRAM(S): at 18:00

## 2022-06-25 RX ADMIN — Medication 50 MILLIEQUIVALENT(S): at 12:21

## 2022-06-25 RX ADMIN — Medication 500 MILLIGRAM(S): at 05:21

## 2022-06-25 RX ADMIN — Medication 10 MILLIGRAM(S): at 15:54

## 2022-06-25 RX ADMIN — GABAPENTIN 100 MILLIGRAM(S): 400 CAPSULE ORAL at 05:20

## 2022-06-25 RX ADMIN — Medication 10 MILLIGRAM(S): at 12:16

## 2022-06-25 RX ADMIN — Medication 81 MILLIGRAM(S): at 11:33

## 2022-06-25 RX ADMIN — SENNA PLUS 2 TABLET(S): 8.6 TABLET ORAL at 21:27

## 2022-06-25 RX ADMIN — GABAPENTIN 100 MILLIGRAM(S): 400 CAPSULE ORAL at 14:23

## 2022-06-25 RX ADMIN — Medication 500 MILLIGRAM(S): at 18:09

## 2022-06-25 RX ADMIN — INSULIN HUMAN 3 UNIT(S)/HR: 100 INJECTION, SOLUTION SUBCUTANEOUS at 21:28

## 2022-06-25 RX ADMIN — Medication 20 MILLIGRAM(S): at 05:21

## 2022-06-25 RX ADMIN — MUPIROCIN 1 APPLICATION(S): 20 OINTMENT TOPICAL at 05:21

## 2022-06-25 RX ADMIN — Medication 650 MILLIGRAM(S): at 18:09

## 2022-06-25 RX ADMIN — POLYETHYLENE GLYCOL 3350 17 GRAM(S): 17 POWDER, FOR SOLUTION ORAL at 11:33

## 2022-06-25 RX ADMIN — OXYCODONE HYDROCHLORIDE 10 MILLIGRAM(S): 5 TABLET ORAL at 05:20

## 2022-06-25 RX ADMIN — OXYCODONE HYDROCHLORIDE 10 MILLIGRAM(S): 5 TABLET ORAL at 16:20

## 2022-06-25 RX ADMIN — OXYCODONE HYDROCHLORIDE 10 MILLIGRAM(S): 5 TABLET ORAL at 16:50

## 2022-06-25 RX ADMIN — Medication 650 MILLIGRAM(S): at 12:27

## 2022-06-25 RX ADMIN — Medication 40 MILLIGRAM(S): at 01:19

## 2022-06-25 NOTE — PROGRESS NOTE ADULT - SUBJECTIVE AND OBJECTIVE BOX
CRITICAL CARE ATTENDING - CTICU    MEDICATIONS  (STANDING):  acetaminophen     Tablet .. 650 milliGRAM(s) Oral every 6 hours  aMIOdarone    Tablet 400 milliGRAM(s) Oral two times a day  ascorbic acid 500 milliGRAM(s) Oral two times a day  aspirin enteric coated 81 milliGRAM(s) Oral daily  bisacodyl Suppository 10 milliGRAM(s) Rectal once  chlorhexidine 2% Cloths 1 Application(s) Topical daily  dextrose 50% Injectable 50 milliLiter(s) IV Push every 15 minutes  enoxaparin Injectable 40 milliGRAM(s) SubCutaneous every 24 hours  furosemide    Tablet 20 milliGRAM(s) Oral daily  gabapentin 100 milliGRAM(s) Oral every 8 hours  insulin regular Infusion 3 Unit(s)/Hr (3 mL/Hr) IV Continuous <Continuous>  milrinone Infusion 0.2 MICROgram(s)/kG/Min (3.96 mL/Hr) IV Continuous <Continuous>  mupirocin 2% Ointment 1 Application(s) Both Nostrils two times a day  pantoprazole  Injectable 40 milliGRAM(s) IV Push daily  polyethylene glycol 3350 17 Gram(s) Oral daily  senna 2 Tablet(s) Oral at bedtime  sodium chloride 0.9%. 1000 milliLiter(s) (10 mL/Hr) IV Continuous <Continuous>  spironolactone 25 milliGRAM(s) Oral daily                            8.5    23.86 )-----------( 125      ( 2022 00:27 )             25.9       06-25    138  |  102  |  23  ----------------------------<  125<H>  4.5   |  23  |  0.88    Ca    9.0      2022 00:27  Phos  2.9     06-25  Mg     2.2     06-25    TPro  6.2  /  Alb  3.8  /  TBili  0.6  /  DBili  x   /  AST  58<H>  /  ALT  31  /  AlkPhos  69  06-25      PT/INR - ( 2022 02:57 )   PT: 16.3 sec;   INR: 1.41 ratio         PTT - ( 2022 02:57 )  PTT:34.4 sec        Daily     Daily Weight in k.4 (2022 00:00)      06-23 @ 07:  -   @ 07:00  --------------------------------------------------------  IN: 3011.2 mL / OUT: 2580 mL / NET: 431.2 mL     @ 07:01   @ 06:25  --------------------------------------------------------  IN: 1000.5 mL / OUT: 2860 mL / NET: -1859.5 mL      Critically Ill patient  : [ ] preoperative ,   [x] post operative    Requires :  [x ] Arterial Line   [x] Central Line  [ ] PA catheter  [ ] IABP  [ ] ECMO  [ ] LVAD  [ ] Ventilator  [x] pacemaker- TPM [ ] Impella.                      [x ] ABG's     [ x] Pulse Oxymetry Monitoring  Bedside evaluation , monitoring , treatment of hemodynamics , fluids , IVP/ IVCD meds.        Diagnosis:     POD 2 - C2L/MVr    Temporary pacemaker (TPM) interrogation and setting.     Chest tube drainage     Requires chest PT, pulmonary toilet, suctioning to maintain SaO2,  patent airway and treat atelectasis.     CHF- acute [ x]   chronic [ x]    systolic [ x]   diatolic [ ]          - Echo- EF - 45-50             [ ] RV dysfunction          - Cxr-cardiomegally, edema          - Clinical-  [x]inotropes   [ ]pressors   [x ]diuresis   [ ]IABP   [ ]ECMO   [ ]LVAD   [ ]Respiratory Failure.     Hemodynamic lability,  instability. Requires IVCD [ ] vasopressors [x ] inotropes  [ ] vasodilator  [x ]IVSS fluid  to maintain MAP, perfusion, C.I.     IVCD Insulin - DM2    Hypovolemia    Thrombocytopenia     Requires bedside physical therapy, mobilization and total detention care.                 By signing my name below, I, Pascale Katz, attest that this documentation has been prepared under the direction and in the presence of Barry Sarah MD.   Electronically Signed: Mini Mosley -22 @ 06:25      Discussed with CT surgeon, Physician Assistant - Nurse Practitioner- Critical care medicine team.   Discussed at  AM / PM rounds.   Chart, labs , films reviewed.    Cumulative Critical Care Time Given Today:  CRITICAL CARE ATTENDING - CTICU    MEDICATIONS  (STANDING):  acetaminophen     Tablet .. 650 milliGRAM(s) Oral every 6 hours  aMIOdarone    Tablet 400 milliGRAM(s) Oral two times a day  ascorbic acid 500 milliGRAM(s) Oral two times a day  aspirin enteric coated 81 milliGRAM(s) Oral daily  bisacodyl Suppository 10 milliGRAM(s) Rectal once  chlorhexidine 2% Cloths 1 Application(s) Topical daily  dextrose 50% Injectable 50 milliLiter(s) IV Push every 15 minutes  enoxaparin Injectable 40 milliGRAM(s) SubCutaneous every 24 hours  furosemide    Tablet 20 milliGRAM(s) Oral daily  gabapentin 100 milliGRAM(s) Oral every 8 hours  insulin regular Infusion 3 Unit(s)/Hr (3 mL/Hr) IV Continuous <Continuous>  milrinone Infusion 0.2 MICROgram(s)/kG/Min (3.96 mL/Hr) IV Continuous <Continuous>  mupirocin 2% Ointment 1 Application(s) Both Nostrils two times a day  pantoprazole  Injectable 40 milliGRAM(s) IV Push daily  polyethylene glycol 3350 17 Gram(s) Oral daily  senna 2 Tablet(s) Oral at bedtime  sodium chloride 0.9%. 1000 milliLiter(s) (10 mL/Hr) IV Continuous <Continuous>  spironolactone 25 milliGRAM(s) Oral daily                            8.5    23.86 )-----------( 125      ( 2022 00:27 )             25.9       06-25    138  |  102  |  23  ----------------------------<  125<H>  4.5   |  23  |  0.88    Ca    9.0      2022 00:27  Phos  2.9     06-25  Mg     2.2     06-25    TPro  6.2  /  Alb  3.8  /  TBili  0.6  /  DBili  x   /  AST  58<H>  /  ALT  31  /  AlkPhos  69  06-25      PT/INR - ( 2022 02:57 )   PT: 16.3 sec;   INR: 1.41 ratio         PTT - ( 2022 02:57 )  PTT:34.4 sec        Daily     Daily Weight in k.4 (2022 00:00)      06-23 @ 07:  -   @ 07:00  --------------------------------------------------------  IN: 3011.2 mL / OUT: 2580 mL / NET: 431.2 mL     @ 07:01   @ 06:25  --------------------------------------------------------  IN: 1000.5 mL / OUT: 2860 mL / NET: -1859.5 mL      Critically Ill patient  : [ ] preoperative ,   [x] post operative    Requires :  [x ] Arterial Line   [x] Central Line  [ ] PA catheter  [ ] IABP  [ ] ECMO  [ ] LVAD  [ ] Ventilator  [x] pacemaker- TPM [ ] Impella.                      [x ] ABG's     [ x] Pulse Oxymetry Monitoring  Bedside evaluation , monitoring , treatment of hemodynamics , fluids , IVP/ IVCD meds.        Diagnosis:     POD 2 - C2L/MVr    Temporary pacemaker (TPM) interrogation and setting.     Chest tube drainage     Requires chest PT, pulmonary toilet, suctioning to maintain SaO2,  patent airway and treat atelectasis.     CHF- acute [ x]   chronic [ x]    systolic [ x]   diatolic [ ]          - Echo- EF - 45's              [ ] RV dysfunction          - Cxr-cardiomegally, edema          - Clinical-  [x]inotropes   [ ]pressors   [x ]diuresis   [ ]IABP   [ ]ECMO   [ ]LVAD   [ ]Respiratory Failure.     Hemodynamic lability,  instability. Requires IVCD [ ] vasopressors [x ] inotropes  [ ] vasodilator  [x ]IVSS fluid  to maintain MAP, perfusion, C.I.     IVCD Insulin - DM2    Fluid  overload     Thrombocytopenia     Requires bedside physical therapy, mobilization and total residential care.                 By signing my name below, I, Pascale Katz, attest that this documentation has been prepared under the direction and in the presence of Barry Sarah MD.   Electronically Signed: Mini Mosley - @ 06:25    I, Barry Sarah, personally performed the services described in this documentation. All medical record entries made by the darrianibjonah were at my direction and in my presence. I have reviewed the chart and agree that the record reflects my personal performance and is accurate and complete.   Barry Sarah MD.       Discussed with CT surgeon, Physician Assistant - Nurse Practitioner- Critical care medicine team.   Discussed at  AM / PM rounds.   Chart, labs , films reviewed.    Cumulative Critical Care Time Given Today:  30 min

## 2022-06-25 NOTE — PROGRESS NOTE ADULT - SUBJECTIVE AND OBJECTIVE BOX
Date of Service   06-25-22 @ 11:07    Patient is a 81y old  Male who presents with a chief complaint of CAD, left main disease (25 Jun 2022 06:25)      INTERVAL HISTORY: pt feels ok     TELEMETRY Personally reviewed: SR     REVIEW OF SYSTEMS:   CONSTITUTIONAL: No weakness  EYES/ENT: No visual changes; No throat pain  Neck: No pain or stiffness  Respiratory: No cough, wheezing, No shortness of breath  CARDIOVASCULAR: no chest pain or palpitations  GASTROINTESTINAL: No abdominal pain, no nausea, vomiting or hematemesis  GENITOURINARY: No dysuria, frequency or hematuria  NEUROLOGICAL: No stroke like symptoms  SKIN: No rashes    	  MEDICATIONS:  aMIOdarone    Tablet 400 milliGRAM(s) Oral two times a day  furosemide    Tablet 20 milliGRAM(s) Oral daily  milrinone Infusion 0.2 MICROgram(s)/kG/Min IV Continuous <Continuous>  spironolactone 25 milliGRAM(s) Oral daily        PHYSICAL EXAM:  T(C): 37 (06-25-22 @ 08:00), Max: 37.4 (06-25-22 @ 04:00)  HR: 73 (06-25-22 @ 10:00) (68 - 93)  BP: 122/62 (06-25-22 @ 10:00) (120/58 - 164/74)  RR: 11 (06-25-22 @ 10:00) (11 - 38)  SpO2: 100% (06-25-22 @ 10:00) (97% - 100%)  Wt(kg): --  I&O's Summary    24 Jun 2022 07:01  -  25 Jun 2022 07:00  --------------------------------------------------------  IN: 1020.4 mL / OUT: 2925 mL / NET: -1904.6 mL    25 Jun 2022 07:01  -  25 Jun 2022 11:07  --------------------------------------------------------  IN: 198.8 mL / OUT: 260 mL / NET: -61.2 mL          Appearance: In no distress	  HEENT:    PERRL, EOMI	  Cardiovascular:  S1 S2, No JVD  Respiratory: Lungs clear to auscultation	  Gastrointestinal:  Soft, Non-tender, + BS	  Vasculature:  No edema of LE  Psychiatric: Appropriate affect   Neuro: no acute focal deficits                               8.5    23.86 )-----------( 125      ( 25 Jun 2022 00:27 )             25.9     06-25    138  |  102  |  23  ----------------------------<  125<H>  4.5   |  23  |  0.88    Ca    9.0      25 Jun 2022 00:27  Phos  2.9     06-25  Mg     2.2     06-25    TPro  6.2  /  Alb  3.8  /  TBili  0.6  /  DBili  x   /  AST  58<H>  /  ALT  31  /  AlkPhos  69  06-25        Labs personally reviewed      ASSESSMENT/PLAN: 		    80 yo male with cad s/p stent x2 on plavix, ischemic cardiomyopathy w/ mild LV dysfunction, HLD, CHF, KIMMY, paroxysmal afib (not on ac due to bleed in the past?), HTN, DM2 presents to the ER with acute R sided chest pressure starting around 11AM today. Admitted for further evaluation due to elevated cardiac enzymes.    Pt s/p cardiac catherization with evidence of CAD with left main disease. PT transferred to [address] for surgical evaluation with Dr. Perez. Labs and imaging to be reviewed with Dr. Perez.     Problem/Recommendation - 1:  ·  Problem; CAD   - EKG noted   - s/p LHC showing CAD of left main   - TTE shows EF 45%  and Severe mitral regurgitation with an eccentric, anteriorly directed jet.  Mild to moderate segmental left ventricular systolic dysfunction.  Hypokinesis of the lateral, inferolateral, and basal inferior walls  - Post op CABG and MV annuloplaty  - c/w ASA 81, Atorvastatin 80, Coreg 6.25 BID and  Imdur 30mg QD     Problem/Recommendation - 2:  ·  Problem; :Systolic HF (heart failure).   - TTE as noted above   - c/w lasix   - on Imdur, Metoprolol, coreg, spironolactone on hold post ip  - Monitor BP   - Currently on Milrinone gtt      Problem/Recommendation - 3:  ·  Problem; HTN   - hypotensive post op  - currently on inotropes for BP support      Problem/Recommendation - 4:  ·  Problem: DM   - ISS       Problem/Recommendation - 5:  ·  Problem: HLD   - c/w statin           Imani Condon FN-BC   Prasanna Scott DO [facility]  Cardiovascular Medicine  [address] [place] [address], [address]  Office: [phone]

## 2022-06-26 DIAGNOSIS — Z98.890 OTHER SPECIFIED POSTPROCEDURAL STATES: ICD-10-CM

## 2022-06-26 DIAGNOSIS — I48.0 PAROXYSMAL ATRIAL FIBRILLATION: ICD-10-CM

## 2022-06-26 DIAGNOSIS — Z95.1 PRESENCE OF AORTOCORONARY BYPASS GRAFT: ICD-10-CM

## 2022-06-26 LAB
ALBUMIN SERPL ELPH-MCNC: 3.6 G/DL — SIGNIFICANT CHANGE UP (ref 3.3–5)
ALP SERPL-CCNC: 91 U/L — SIGNIFICANT CHANGE UP (ref 40–120)
ALT FLD-CCNC: 33 U/L — SIGNIFICANT CHANGE UP (ref 10–45)
ANION GAP SERPL CALC-SCNC: 8 MMOL/L — SIGNIFICANT CHANGE UP (ref 5–17)
AST SERPL-CCNC: 38 U/L — SIGNIFICANT CHANGE UP (ref 10–40)
BASE EXCESS BLDV CALC-SCNC: 0.5 MMOL/L — SIGNIFICANT CHANGE UP (ref -2–2)
BASE EXCESS BLDV CALC-SCNC: 1.9 MMOL/L — SIGNIFICANT CHANGE UP (ref -2–2)
BASE EXCESS BLDV CALC-SCNC: 2.8 MMOL/L — HIGH (ref -2–2)
BILIRUB SERPL-MCNC: 0.5 MG/DL — SIGNIFICANT CHANGE UP (ref 0.2–1.2)
BUN SERPL-MCNC: 23 MG/DL — SIGNIFICANT CHANGE UP (ref 7–23)
CA-I SERPL-SCNC: 1.22 MMOL/L — SIGNIFICANT CHANGE UP (ref 1.15–1.33)
CALCIUM SERPL-MCNC: 9 MG/DL — SIGNIFICANT CHANGE UP (ref 8.4–10.5)
CHLORIDE BLDV-SCNC: 105 MMOL/L — SIGNIFICANT CHANGE UP (ref 96–108)
CHLORIDE SERPL-SCNC: 105 MMOL/L — SIGNIFICANT CHANGE UP (ref 96–108)
CO2 BLDV-SCNC: 27 MMOL/L — HIGH (ref 22–26)
CO2 BLDV-SCNC: 29 MMOL/L — HIGH (ref 22–26)
CO2 BLDV-SCNC: 30 MMOL/L — HIGH (ref 22–26)
CO2 SERPL-SCNC: 26 MMOL/L — SIGNIFICANT CHANGE UP (ref 22–31)
CREAT SERPL-MCNC: 0.76 MG/DL — SIGNIFICANT CHANGE UP (ref 0.5–1.3)
EGFR: 91 ML/MIN/1.73M2 — SIGNIFICANT CHANGE UP
GAS PNL BLDA: SIGNIFICANT CHANGE UP
GAS PNL BLDV: 136 MMOL/L — SIGNIFICANT CHANGE UP (ref 136–145)
GAS PNL BLDV: SIGNIFICANT CHANGE UP
GLUCOSE BLDC GLUCOMTR-MCNC: 111 MG/DL — HIGH (ref 70–99)
GLUCOSE BLDC GLUCOMTR-MCNC: 115 MG/DL — HIGH (ref 70–99)
GLUCOSE BLDC GLUCOMTR-MCNC: 119 MG/DL — HIGH (ref 70–99)
GLUCOSE BLDC GLUCOMTR-MCNC: 124 MG/DL — HIGH (ref 70–99)
GLUCOSE BLDC GLUCOMTR-MCNC: 138 MG/DL — HIGH (ref 70–99)
GLUCOSE BLDC GLUCOMTR-MCNC: 140 MG/DL — HIGH (ref 70–99)
GLUCOSE BLDC GLUCOMTR-MCNC: 144 MG/DL — HIGH (ref 70–99)
GLUCOSE BLDC GLUCOMTR-MCNC: 159 MG/DL — HIGH (ref 70–99)
GLUCOSE BLDC GLUCOMTR-MCNC: 162 MG/DL — HIGH (ref 70–99)
GLUCOSE BLDC GLUCOMTR-MCNC: 190 MG/DL — HIGH (ref 70–99)
GLUCOSE BLDV-MCNC: 118 MG/DL — HIGH (ref 70–99)
GLUCOSE SERPL-MCNC: 120 MG/DL — HIGH (ref 70–99)
HCO3 BLDV-SCNC: 26 MMOL/L — SIGNIFICANT CHANGE UP (ref 22–29)
HCO3 BLDV-SCNC: 27 MMOL/L — SIGNIFICANT CHANGE UP (ref 22–29)
HCO3 BLDV-SCNC: 28 MMOL/L — SIGNIFICANT CHANGE UP (ref 22–29)
HCT VFR BLD CALC: 26.7 % — LOW (ref 39–50)
HCT VFR BLDA CALC: 27 % — LOW (ref 39–51)
HGB BLD CALC-MCNC: 8.9 G/DL — LOW (ref 12.6–17.4)
HGB BLD-MCNC: 8.8 G/DL — LOW (ref 13–17)
HOROWITZ INDEX BLDV+IHG-RTO: 28 — SIGNIFICANT CHANGE UP
LACTATE BLDV-MCNC: 0.8 MMOL/L — SIGNIFICANT CHANGE UP (ref 0.7–2)
MAGNESIUM SERPL-MCNC: 2.3 MG/DL — SIGNIFICANT CHANGE UP (ref 1.6–2.6)
MCHC RBC-ENTMCNC: 27.1 PG — SIGNIFICANT CHANGE UP (ref 27–34)
MCHC RBC-ENTMCNC: 33 GM/DL — SIGNIFICANT CHANGE UP (ref 32–36)
MCV RBC AUTO: 82.2 FL — SIGNIFICANT CHANGE UP (ref 80–100)
NRBC # BLD: 0 /100 WBCS — SIGNIFICANT CHANGE UP (ref 0–0)
PCO2 BLDV: 44 MMHG — SIGNIFICANT CHANGE UP (ref 42–55)
PCO2 BLDV: 45 MMHG — SIGNIFICANT CHANGE UP (ref 42–55)
PCO2 BLDV: 47 MMHG — SIGNIFICANT CHANGE UP (ref 42–55)
PH BLDV: 7.38 — SIGNIFICANT CHANGE UP (ref 7.32–7.43)
PH BLDV: 7.39 — SIGNIFICANT CHANGE UP (ref 7.32–7.43)
PH BLDV: 7.39 — SIGNIFICANT CHANGE UP (ref 7.32–7.43)
PHOSPHATE SERPL-MCNC: 2.3 MG/DL — LOW (ref 2.5–4.5)
PLATELET # BLD AUTO: 134 K/UL — LOW (ref 150–400)
PO2 BLDV: 38 MMHG — SIGNIFICANT CHANGE UP (ref 25–45)
PO2 BLDV: 39 MMHG — SIGNIFICANT CHANGE UP (ref 25–45)
PO2 BLDV: 40 MMHG — SIGNIFICANT CHANGE UP (ref 25–45)
POTASSIUM BLDV-SCNC: 4 MMOL/L — SIGNIFICANT CHANGE UP (ref 3.5–5.1)
POTASSIUM SERPL-MCNC: 4.2 MMOL/L — SIGNIFICANT CHANGE UP (ref 3.5–5.3)
POTASSIUM SERPL-SCNC: 4.2 MMOL/L — SIGNIFICANT CHANGE UP (ref 3.5–5.3)
PROT SERPL-MCNC: 6.2 G/DL — SIGNIFICANT CHANGE UP (ref 6–8.3)
RBC # BLD: 3.25 M/UL — LOW (ref 4.2–5.8)
RBC # FLD: 15.4 % — HIGH (ref 10.3–14.5)
SAO2 % BLDV: 59.9 % — LOW (ref 67–88)
SAO2 % BLDV: 64.6 % — LOW (ref 67–88)
SAO2 % BLDV: 65.2 % — LOW (ref 67–88)
SODIUM SERPL-SCNC: 139 MMOL/L — SIGNIFICANT CHANGE UP (ref 135–145)
WBC # BLD: 19.03 K/UL — HIGH (ref 3.8–10.5)
WBC # FLD AUTO: 19.03 K/UL — HIGH (ref 3.8–10.5)

## 2022-06-26 PROCEDURE — 71045 X-RAY EXAM CHEST 1 VIEW: CPT | Mod: 26

## 2022-06-26 PROCEDURE — 99291 CRITICAL CARE FIRST HOUR: CPT

## 2022-06-26 RX ORDER — POTASSIUM CHLORIDE 20 MEQ
10 PACKET (EA) ORAL
Refills: 0 | Status: COMPLETED | OUTPATIENT
Start: 2022-06-26 | End: 2022-06-26

## 2022-06-26 RX ORDER — ATORVASTATIN CALCIUM 80 MG/1
80 TABLET, FILM COATED ORAL AT BEDTIME
Refills: 0 | Status: DISCONTINUED | OUTPATIENT
Start: 2022-06-26 | End: 2022-06-29

## 2022-06-26 RX ORDER — APIXABAN 2.5 MG/1
2.5 TABLET, FILM COATED ORAL EVERY 12 HOURS
Refills: 0 | Status: DISCONTINUED | OUTPATIENT
Start: 2022-06-26 | End: 2022-06-27

## 2022-06-26 RX ORDER — TAMSULOSIN HYDROCHLORIDE 0.4 MG/1
0.4 CAPSULE ORAL AT BEDTIME
Refills: 0 | Status: DISCONTINUED | OUTPATIENT
Start: 2022-06-26 | End: 2022-06-29

## 2022-06-26 RX ORDER — INSULIN LISPRO 100/ML
VIAL (ML) SUBCUTANEOUS AT BEDTIME
Refills: 0 | Status: DISCONTINUED | OUTPATIENT
Start: 2022-06-26 | End: 2022-06-26

## 2022-06-26 RX ORDER — DEXTROSE 50 % IN WATER 50 %
25 SYRINGE (ML) INTRAVENOUS ONCE
Refills: 0 | Status: DISCONTINUED | OUTPATIENT
Start: 2022-06-26 | End: 2022-06-29

## 2022-06-26 RX ORDER — INSULIN LISPRO 100/ML
8 VIAL (ML) SUBCUTANEOUS
Refills: 0 | Status: DISCONTINUED | OUTPATIENT
Start: 2022-06-26 | End: 2022-06-29

## 2022-06-26 RX ORDER — GLUCAGON INJECTION, SOLUTION 0.5 MG/.1ML
1 INJECTION, SOLUTION SUBCUTANEOUS ONCE
Refills: 0 | Status: DISCONTINUED | OUTPATIENT
Start: 2022-06-26 | End: 2022-06-29

## 2022-06-26 RX ORDER — INSULIN LISPRO 100/ML
VIAL (ML) SUBCUTANEOUS
Refills: 0 | Status: DISCONTINUED | OUTPATIENT
Start: 2022-06-26 | End: 2022-06-26

## 2022-06-26 RX ORDER — INSULIN LISPRO 100/ML
VIAL (ML) SUBCUTANEOUS
Refills: 0 | Status: DISCONTINUED | OUTPATIENT
Start: 2022-06-26 | End: 2022-06-29

## 2022-06-26 RX ORDER — DEXTROSE 50 % IN WATER 50 %
12.5 SYRINGE (ML) INTRAVENOUS ONCE
Refills: 0 | Status: DISCONTINUED | OUTPATIENT
Start: 2022-06-26 | End: 2022-06-29

## 2022-06-26 RX ORDER — DEXTROSE 50 % IN WATER 50 %
15 SYRINGE (ML) INTRAVENOUS ONCE
Refills: 0 | Status: DISCONTINUED | OUTPATIENT
Start: 2022-06-26 | End: 2022-06-29

## 2022-06-26 RX ORDER — INSULIN GLARGINE 100 [IU]/ML
20 INJECTION, SOLUTION SUBCUTANEOUS AT BEDTIME
Refills: 0 | Status: DISCONTINUED | OUTPATIENT
Start: 2022-06-26 | End: 2022-06-29

## 2022-06-26 RX ORDER — HYDRALAZINE HCL 50 MG
5 TABLET ORAL ONCE
Refills: 0 | Status: COMPLETED | OUTPATIENT
Start: 2022-06-26 | End: 2022-06-26

## 2022-06-26 RX ORDER — INSULIN LISPRO 100/ML
7 VIAL (ML) SUBCUTANEOUS
Refills: 0 | Status: DISCONTINUED | OUTPATIENT
Start: 2022-06-26 | End: 2022-06-26

## 2022-06-26 RX ORDER — INSULIN GLARGINE 100 [IU]/ML
8 INJECTION, SOLUTION SUBCUTANEOUS AT BEDTIME
Refills: 0 | Status: DISCONTINUED | OUTPATIENT
Start: 2022-06-26 | End: 2022-06-26

## 2022-06-26 RX ORDER — INSULIN GLARGINE 100 [IU]/ML
16 INJECTION, SOLUTION SUBCUTANEOUS AT BEDTIME
Refills: 0 | Status: DISCONTINUED | OUTPATIENT
Start: 2022-06-26 | End: 2022-06-26

## 2022-06-26 RX ORDER — HYDRALAZINE HCL 50 MG
25 TABLET ORAL EVERY 8 HOURS
Refills: 0 | Status: DISCONTINUED | OUTPATIENT
Start: 2022-06-26 | End: 2022-06-28

## 2022-06-26 RX ADMIN — TAMSULOSIN HYDROCHLORIDE 0.4 MILLIGRAM(S): 0.4 CAPSULE ORAL at 22:25

## 2022-06-26 RX ADMIN — GABAPENTIN 100 MILLIGRAM(S): 400 CAPSULE ORAL at 05:09

## 2022-06-26 RX ADMIN — SPIRONOLACTONE 25 MILLIGRAM(S): 25 TABLET, FILM COATED ORAL at 05:11

## 2022-06-26 RX ADMIN — CHLORHEXIDINE GLUCONATE 1 APPLICATION(S): 213 SOLUTION TOPICAL at 13:20

## 2022-06-26 RX ADMIN — Medication 650 MILLIGRAM(S): at 00:40

## 2022-06-26 RX ADMIN — Medication 5 MILLIGRAM(S): at 00:41

## 2022-06-26 RX ADMIN — OXYCODONE HYDROCHLORIDE 5 MILLIGRAM(S): 5 TABLET ORAL at 23:06

## 2022-06-26 RX ADMIN — Medication 500 MILLIGRAM(S): at 05:09

## 2022-06-26 RX ADMIN — Medication 650 MILLIGRAM(S): at 05:09

## 2022-06-26 RX ADMIN — Medication 10 MILLIGRAM(S): at 05:11

## 2022-06-26 RX ADMIN — Medication 50 MILLIEQUIVALENT(S): at 06:01

## 2022-06-26 RX ADMIN — INSULIN GLARGINE 8 UNIT(S): 100 INJECTION, SOLUTION SUBCUTANEOUS at 03:24

## 2022-06-26 RX ADMIN — Medication 50 MILLIEQUIVALENT(S): at 05:30

## 2022-06-26 RX ADMIN — MUPIROCIN 1 APPLICATION(S): 20 OINTMENT TOPICAL at 05:42

## 2022-06-26 RX ADMIN — OXYCODONE HYDROCHLORIDE 5 MILLIGRAM(S): 5 TABLET ORAL at 22:32

## 2022-06-26 RX ADMIN — POLYETHYLENE GLYCOL 3350 17 GRAM(S): 17 POWDER, FOR SOLUTION ORAL at 12:01

## 2022-06-26 RX ADMIN — Medication 1: at 07:43

## 2022-06-26 RX ADMIN — Medication 10 MILLIGRAM(S): at 13:45

## 2022-06-26 RX ADMIN — Medication 500 MILLIGRAM(S): at 17:06

## 2022-06-26 RX ADMIN — Medication 25 MILLIGRAM(S): at 13:43

## 2022-06-26 RX ADMIN — Medication 8 UNIT(S): at 17:06

## 2022-06-26 RX ADMIN — SENNA PLUS 2 TABLET(S): 8.6 TABLET ORAL at 22:25

## 2022-06-26 RX ADMIN — Medication 50 MILLIEQUIVALENT(S): at 05:00

## 2022-06-26 RX ADMIN — PANTOPRAZOLE SODIUM 40 MILLIGRAM(S): 20 TABLET, DELAYED RELEASE ORAL at 06:01

## 2022-06-26 RX ADMIN — GABAPENTIN 100 MILLIGRAM(S): 400 CAPSULE ORAL at 22:25

## 2022-06-26 RX ADMIN — ATORVASTATIN CALCIUM 80 MILLIGRAM(S): 80 TABLET, FILM COATED ORAL at 22:25

## 2022-06-26 RX ADMIN — Medication 63.75 MILLIMOLE(S): at 02:16

## 2022-06-26 RX ADMIN — Medication 10 MILLIGRAM(S): at 22:24

## 2022-06-26 RX ADMIN — INSULIN GLARGINE 20 UNIT(S): 100 INJECTION, SOLUTION SUBCUTANEOUS at 22:24

## 2022-06-26 RX ADMIN — Medication 8 UNIT(S): at 11:59

## 2022-06-26 RX ADMIN — MUPIROCIN 1 APPLICATION(S): 20 OINTMENT TOPICAL at 17:08

## 2022-06-26 RX ADMIN — Medication 25 MILLIGRAM(S): at 06:01

## 2022-06-26 RX ADMIN — Medication 25 MILLIGRAM(S): at 22:25

## 2022-06-26 RX ADMIN — Medication 81 MILLIGRAM(S): at 12:01

## 2022-06-26 RX ADMIN — GABAPENTIN 100 MILLIGRAM(S): 400 CAPSULE ORAL at 13:43

## 2022-06-26 RX ADMIN — AMIODARONE HYDROCHLORIDE 400 MILLIGRAM(S): 400 TABLET ORAL at 05:09

## 2022-06-26 RX ADMIN — TAMSULOSIN HYDROCHLORIDE 0.4 MILLIGRAM(S): 0.4 CAPSULE ORAL at 02:26

## 2022-06-26 RX ADMIN — Medication 2: at 11:59

## 2022-06-26 RX ADMIN — Medication 650 MILLIGRAM(S): at 13:38

## 2022-06-26 RX ADMIN — Medication 650 MILLIGRAM(S): at 00:52

## 2022-06-26 RX ADMIN — Medication 650 MILLIGRAM(S): at 06:01

## 2022-06-26 RX ADMIN — APIXABAN 2.5 MILLIGRAM(S): 2.5 TABLET, FILM COATED ORAL at 17:06

## 2022-06-26 RX ADMIN — Medication 20 MILLIGRAM(S): at 05:11

## 2022-06-26 RX ADMIN — Medication 650 MILLIGRAM(S): at 12:01

## 2022-06-26 NOTE — PROGRESS NOTE ADULT - PROBLEM SELECTOR PLAN 3
A1C 7.0%  [facility] and qHS  Diabetic Diet  Endocrinology following Dr. Ovidio hernandez/raudel Santos 20u qHS and [facility] 8u [facility]

## 2022-06-26 NOTE — CONSULT NOTE ADULT - ASSESSMENT
Assessment  DMT2: 81y Male with DM T2 with hyperglycemia admitted with chest pain, patient was on oral hypoglycemic agents at home, now on basal bolus and IV insulin, blood sugars improving, no hypoglycemic episode,  started eating meals, compliant with low carb diet.  Patient is high risk with high level decision making due to uncontrolled diabetes, has increased risk for complications.  CAD: S/P CABG, on medications, monitored, asymptomatic.  HTN: On antihypertensive medications, monitored, asymptomatic.  Overweight: No strict exercise routines, neither on low calorie diet.                 Svitlana Nolan MD  Cell: [phone]  Office: [phone]

## 2022-06-26 NOTE — CONSULT NOTE ADULT - SUBJECTIVE AND OBJECTIVE BOX
HPI:  80 yo male with cad s/p stent x2 on plavix, ischemic cardiomyopathy w/ mild LV dysfunction, HLD, CHF, KIMMY, paroxysmal afib (not on ac due to bleed in the past?), HTN, DM2 presents to the ER with acute R sided chest pressure starting around 11AM today. Admitted for further evaluation due to elevated cardiac enzymes.    Pt s/p cardiac catherization with evidence of CAD with left main disease. PT transferred to [address] for surgical evaluation with Dr. Perez. Labs and imaging to be reviewed with Dr. Perez.   (17 Jun 2022 01:25)  Patient has history of diabetes, on oral meds at home, no recent hypoglycemic episodes, no polyuria polydipsia. Patient follows up with PCP for diabetes management.    PAST MEDICAL & SURGICAL HISTORY:  CAD (coronary artery disease)      CHF (congestive heart failure)      HTN (hypertension)      HLD (hyperlipidemia)      DM (diabetes mellitus)      KIMMY (iron deficiency anemia)      PAF (paroxysmal atrial fibrillation)      Chronic diastolic congestive heart failure, NYHA class 3      CAD (coronary artery disease)      CHF NYHA class III      DM (diabetes mellitus)      HLD (hyperlipidemia)      HTN (hypertension)      KIMMY (iron deficiency anemia)      PAF (paroxysmal atrial fibrillation)      S/P hernia repair  Lt      S/P coronary artery stent placement  2x in 4/17 in [place]      S/P coronary artery stent placement  x2 4/17 in [place]      H/O hernia repair          FAMILY HISTORY:  No pertinent family history in first degree relatives        Social History:    Outpatient Medications:    MEDICATIONS  (STANDING):  acetaminophen     Tablet .. 650 milliGRAM(s) Oral every 6 hours  ascorbic acid 500 milliGRAM(s) Oral two times a day  aspirin enteric coated 81 milliGRAM(s) Oral daily  atorvastatin 80 milliGRAM(s) Oral at bedtime  bisacodyl Suppository 10 milliGRAM(s) Rectal once  chlorhexidine 2% Cloths 1 Application(s) Topical daily  dextrose 50% Injectable 25 Gram(s) IV Push once  dextrose 50% Injectable 12.5 Gram(s) IV Push once  dextrose 50% Injectable 25 Gram(s) IV Push once  dextrose Oral Gel 15 Gram(s) Oral once  enoxaparin Injectable 40 milliGRAM(s) SubCutaneous every 24 hours  furosemide    Tablet 20 milliGRAM(s) Oral daily  gabapentin 100 milliGRAM(s) Oral every 8 hours  glucagon  Injectable 1 milliGRAM(s) IntraMuscular once  hydrALAZINE 25 milliGRAM(s) Oral every 8 hours  insulin glargine Injectable (LANTUS) 16 Unit(s) SubCutaneous at bedtime  insulin lispro (ADMELOG) corrective regimen sliding scale   SubCutaneous three times a day before meals  insulin lispro (ADMELOG) corrective regimen sliding scale   SubCutaneous at bedtime  insulin lispro Injectable (ADMELOG) 7 Unit(s) SubCutaneous three times a day before meals  insulin regular Infusion 3 Unit(s)/Hr (3 mL/Hr) IV Continuous <Continuous>  metoclopramide Injectable 10 milliGRAM(s) IV Push every 8 hours  milrinone Infusion 0.2 MICROgram(s)/kG/Min (3.96 mL/Hr) IV Continuous <Continuous>  mupirocin 2% Ointment 1 Application(s) Both Nostrils two times a day  pantoprazole    Tablet 40 milliGRAM(s) Oral before breakfast  polyethylene glycol 3350 17 Gram(s) Oral daily  senna 2 Tablet(s) Oral at bedtime  sodium chloride 0.9%. 1000 milliLiter(s) (10 mL/Hr) IV Continuous <Continuous>  spironolactone 25 milliGRAM(s) Oral daily  tamsulosin 0.4 milliGRAM(s) Oral at bedtime    MEDICATIONS  (PRN):  acetaminophen     Tablet .. 650 milliGRAM(s) Oral every 6 hours PRN Mild Pain (1 - 3)  oxyCODONE    IR 10 milliGRAM(s) Oral every 4 hours PRN Severe Pain (7 - 10)  oxyCODONE    IR 5 milliGRAM(s) Oral every 4 hours PRN Moderate Pain (4 - 6)      Allergies    No Known Allergies    Intolerances      Review of Systems:  Constitutional: No fever, no chills  Eyes: No blurry vision  Neuro: No tremors  HEENT: No pain, no neck swelling  Cardiovascular: No chest pain, no palpitations  Respiratory: No SOB, no cough  GI: No nausea, vomiting, abdominal pain  : No dysuria  Skin: no rash  MSK: Has leg swelling.  Psych: no depression  Endocrine: no polyuria, polydipsia    UNABLE TO OBTAIN    ALL OTHER SYSTEMS REVIEWED AND NEGATIVE        PHYSICAL EXAM:  VITALS: T(C): 36.6 (06-26-22 @ 08:00)  T(F): 97.9 (06-26-22 @ 08:00), Max: 100 (06-25-22 @ 16:00)  HR: 88 (06-26-22 @ 10:00) (65 - 88)  BP: 120/60 (06-26-22 @ 09:00) (110/58 - 150/81)  RR:  (10 - 37)  SpO2:  (98% - 100%)  Wt(kg): --  GENERAL: NAD, well-groomed, well-developed  EYES: No proptosis, no lid lag  HEENT:  Atraumatic, Normocephalic  THYROID: Normal size, no palpable nodules  RESPIRATORY: Clear to auscultation bilaterally; No rales, rhonchi, wheezing  CARDIOVASCULAR: Si S2, No murmurs;  GI: Soft, non distended, normal bowel sounds  SKIN: Dry, intact, No rashes or lesions  MUSCULOSKELETAL: Has BL lower extremity edema.  NEURO:  no tremor, sensation decreased in feet BL,    POCT Blood Glucose.: 162 mg/dL (06-26-22 @ 07:26)  POCT Blood Glucose.: 159 mg/dL (06-26-22 @ 05:49)  POCT Blood Glucose.: 144 mg/dL (06-26-22 @ 04:49)  POCT Blood Glucose.: 140 mg/dL (06-26-22 @ 03:46)  POCT Blood Glucose.: 111 mg/dL (06-26-22 @ 03:15)  POCT Blood Glucose.: 124 mg/dL (06-26-22 @ 02:12)  POCT Blood Glucose.: 115 mg/dL (06-26-22 @ 00:48)  POCT Blood Glucose.: 115 mg/dL (06-25-22 @ 23:59)  POCT Blood Glucose.: 138 mg/dL (06-25-22 @ 23:04)  POCT Blood Glucose.: 151 mg/dL (06-25-22 @ 22:12)  POCT Blood Glucose.: 147 mg/dL (06-25-22 @ 20:54)  POCT Blood Glucose.: 109 mg/dL (06-25-22 @ 20:34)  POCT Blood Glucose.: 77 mg/dL (06-25-22 @ 20:16)  POCT Blood Glucose.: 133 mg/dL (06-25-22 @ 18:50)  POCT Blood Glucose.: 200 mg/dL (06-25-22 @ 18:20)  POCT Blood Glucose.: 119 mg/dL (06-25-22 @ 16:59)  POCT Blood Glucose.: 116 mg/dL (06-25-22 @ 15:53)  POCT Blood Glucose.: 160 mg/dL (06-25-22 @ 14:56)  POCT Blood Glucose.: 201 mg/dL (06-25-22 @ 14:24)  POCT Blood Glucose.: 140 mg/dL (06-25-22 @ 13:03)  POCT Blood Glucose.: 129 mg/dL (06-25-22 @ 12:20)  POCT Blood Glucose.: 149 mg/dL (06-25-22 @ 10:46)  POCT Blood Glucose.: 179 mg/dL (06-25-22 @ 10:14)  POCT Blood Glucose.: 203 mg/dL (06-25-22 @ 09:13)  POCT Blood Glucose.: 173 mg/dL (06-25-22 @ 08:08)  POCT Blood Glucose.: 130 mg/dL (06-25-22 @ 06:56)  POCT Blood Glucose.: 124 mg/dL (06-25-22 @ 05:48)  POCT Blood Glucose.: 127 mg/dL (06-25-22 @ 05:17)  POCT Blood Glucose.: 134 mg/dL (06-25-22 @ 04:11)  POCT Blood Glucose.: 151 mg/dL (06-25-22 @ 03:10)  POCT Blood Glucose.: 164 mg/dL (06-25-22 @ 02:22)  POCT Blood Glucose.: 141 mg/dL (06-25-22 @ 01:06)  POCT Blood Glucose.: 126 mg/dL (06-25-22 @ 00:00)  POCT Blood Glucose.: 106 mg/dL (06-24-22 @ 22:55)  POCT Blood Glucose.: 94 mg/dL (06-24-22 @ 22:33)  POCT Blood Glucose.: 98 mg/dL (06-24-22 @ 22:17)  POCT Blood Glucose.: 179 mg/dL (06-24-22 @ 20:06)  POCT Blood Glucose.: 239 mg/dL (06-24-22 @ 18:52)  POCT Blood Glucose.: 174 mg/dL (06-24-22 @ 17:31)  POCT Blood Glucose.: 184 mg/dL (06-24-22 @ 16:35)  POCT Blood Glucose.: 93 mg/dL (06-24-22 @ 15:56)  POCT Blood Glucose.: 103 mg/dL (06-24-22 @ 14:56)  POCT Blood Glucose.: 117 mg/dL (06-24-22 @ 14:15)  POCT Blood Glucose.: 155 mg/dL (06-24-22 @ 13:09)  POCT Blood Glucose.: 148 mg/dL (06-24-22 @ 12:06)  POCT Blood Glucose.: 123 mg/dL (06-24-22 @ 11:08)  POCT Blood Glucose.: 167 mg/dL (06-24-22 @ 10:06)  POCT Blood Glucose.: 162 mg/dL (06-24-22 @ 09:00)  POCT Blood Glucose.: 129 mg/dL (06-24-22 @ 07:55)  POCT Blood Glucose.: 146 mg/dL (06-24-22 @ 06:43)  POCT Blood Glucose.: 158 mg/dL (06-24-22 @ 06:09)  POCT Blood Glucose.: 142 mg/dL (06-24-22 @ 05:02)  POCT Blood Glucose.: 140 mg/dL (06-24-22 @ 03:51)  POCT Blood Glucose.: 132 mg/dL (06-24-22 @ 03:08)  POCT Blood Glucose.: 120 mg/dL (06-24-22 @ 02:01)  POCT Blood Glucose.: 95 mg/dL (06-24-22 @ 01:07)  POCT Blood Glucose.: 107 mg/dL (06-23-22 @ 23:55)  POCT Blood Glucose.: 112 mg/dL (06-23-22 @ 22:59)  POCT Blood Glucose.: 116 mg/dL (06-23-22 @ 21:51)  POCT Blood Glucose.: 136 mg/dL (06-23-22 @ 19:50)  POCT Blood Glucose.: 144 mg/dL (06-23-22 @ 18:50)  POCT Blood Glucose.: 148 mg/dL (06-23-22 @ 18:09)  POCT Blood Glucose.: 158 mg/dL (06-23-22 @ 17:04)  POCT Blood Glucose.: 163 mg/dL (06-23-22 @ 16:03)  POCT Blood Glucose.: 175 mg/dL (06-23-22 @ 15:08)                            8.8    19.03 )-----------( 134      ( 26 Jun 2022 00:34 )             26.7       06-26    139  |  105  |  23  ----------------------------<  120<H>  4.2   |  26  |  0.76    eGFR: 91    Ca    9.0      06-26  Mg     2.3     06-26  Phos  2.3     06-26    TPro  6.2  /  Alb  3.6  /  TBili  0.5  /  DBili  x   /  AST  38  /  ALT  33  /  AlkPhos  91  06-26      Thyroid Function Tests:  06-17 @ 07:41 TSH 2.49 FreeT4 1.2 T3 -- Anti TPO -- Anti Thyroglobulin Ab -- TSI --  06-16 @ 08:01 TSH 2.08 FreeT4 -- T3 -- Anti TPO -- Anti Thyroglobulin Ab -- TSI --          06-16 Chol 137 Direct LDL -- LDL calculated 70 HDL 57 Trig 49    Radiology:

## 2022-06-26 NOTE — PROGRESS NOTE ADULT - ASSESSMENT
82 yo male with cad s/p stent x2 on plavix, ischemic cardiomyopathy w/ mild LV dysfunction, HLD, CHF, KIMMY, paroxysmal afib (not on ac due to bleed in the past?), HTN, DM2 presents to the ER with acute R sided chest pressure starting around 11AM today. Admitted for further evaluation due to elevated cardiac enzymes.  Pt s/p cardiac catherization with evidence of CAD with left main disease. PT transferred to [facility] [place] for surgical evaluation with Dr. Perez. Labs and imaging to be reviewed with Dr. Perez.      6/17: transferred to [address] [place] [address], VSS; acc junct/ rsr 50-60; preop w/u in progress, f/u P2Y12 123 today;  ck echo: mod MR; mild LV systolic dysfunction   continue asa/ statin and bb; add dvt prophylaxis   6/19  vss    p2y12    133  6/20 VSS: pt stable at this time; repeat p2y12 in am; OR planned for thur 6/23 with Dr. Perez   6/21 VSS; RSR 50-60; repeat p2y12 this am 152; OR planned for thur 6/24 with Dr. Perez   6/22 VSS P2Y12 138   cardiac surgery workup completed OR in am  6/23 s/p C2L and MV repair  6/26 Primacor gtt dced at 4am, Insulin gtt dced this AM, transferred to floor, due to void at 6pm, Eliquis 2.5 BID for afib as per Dr. Perez, start BB tomorrow due to prolonged inotrope     82 yo male with cad s/p stent x2 on plavix, ischemic cardiomyopathy w/ mild LV dysfunction, HLD, CHF, KIMMY, paroxysmal afib (not on ac due to bleed in the past?), HTN, DM2 presents to the ER with acute R sided chest pressure starting around 11AM today. Admitted for further evaluation due to elevated cardiac enzymes.  Pt s/p cardiac catherization with evidence of CAD with left main disease. PT transferred to [facility] [place] for surgical evaluation with Dr. Perez. Labs and imaging to be reviewed with Dr. Perez.      6/17: transferred to [address] [place] [address], VSS; acc junct/ rsr 50-60; preop w/u in progress, f/u P2Y12 123 today;  ck echo: mod MR; mild LV systolic dysfunction   continue asa/ statin and bb; add dvt prophylaxis   6/19  vss    p2y12    133  6/20 VSS: pt stable at this time; repeat p2y12 in am; OR planned for thur 6/23 with Dr. Perez   6/21 VSS; RSR 50-60; repeat p2y12 this am 152; OR planned for thur 6/24 with Dr. Perez   6/22 VSS P2Y12 138   cardiac surgery workup completed OR in am  6/23 s/p C2L and MV repair  6/26 Primacor gtt dced at 4am, Insulin gtt dced this AM, transferred to floor, due to void at 845pm, Eliquis 2.5 BID for afib as per Dr. Perez, start BB tomorrow due to prolonged inotrope

## 2022-06-26 NOTE — PROGRESS NOTE ADULT - SUBJECTIVE AND OBJECTIVE BOX
VITAL SIGNS    Telemetry:  SR 1st degree, 80s  Overnight Events: no acute events    Vital Signs Last 24 Hrs  T(C): 36.5 (22 @ 12:40), Max: 37.8 (22 @ 16:00)  T(F): 97.7 (22 @ 12:40), Max: 100 (22 @ 16:00)  HR: 83 (22 @ 12:40) (65 - 88)  BP: 128/78 (22 @ 12:40) (110/58 - 150/81)  RR: 20 (22 @ 12:40) (10 - 37)  SpO2: 100% (22 @ 12:40) (97% - 100%)             @ 07:01  -   @ 07:00  --------------------------------------------------------  IN: 1187.8 mL / OUT: 1245 mL / NET: -57.2 mL     @ 07:01  -   @ 12:56  --------------------------------------------------------  IN: 40 mL / OUT: 530 mL / NET: -490 mL       Daily     Daily Weight in k (2022 00:00)  Admit Wt: Drug Dosing Weight  Height (cm): 170.2 (2022 06:30)  Weight (kg): 66 (2022 06:30)  BMI (kg/m2): 22.8 (2022 06:30)  BSA (m2): 1.77 (2022 06:30)    Bilirubin Total, Serum: 0.5 mg/dL ( @ 00:39)    CAPILLARY BLOOD GLUCOSE  190 (2022 12:00)  162 (2022 07:00)  154 (2022 06:00)  144 (2022 05:00)  140 (2022 04:00)  111 (2022 03:00)  124 (2022 02:00)  115 (2022 01:00)  115 (2022 00:00)  138 (2022 23:00)  151 (2022 22:00)  147 (2022 21:00)  109 (2022 20:30)  77 (2022 20:00)      POCT Blood Glucose.: 190 mg/dL (2022 11:45)  POCT Blood Glucose.: 162 mg/dL (2022 07:26)  POCT Blood Glucose.: 159 mg/dL (2022 05:49)  POCT Blood Glucose.: 144 mg/dL (2022 04:49)  POCT Blood Glucose.: 140 mg/dL (2022 03:46)  POCT Blood Glucose.: 111 mg/dL (2022 03:15)  POCT Blood Glucose.: 124 mg/dL (2022 02:12)  POCT Blood Glucose.: 115 mg/dL (2022 00:48)  POCT Blood Glucose.: 115 mg/dL (2022 23:59)  POCT Blood Glucose.: 138 mg/dL (2022 23:04)  POCT Blood Glucose.: 151 mg/dL (2022 22:12)  POCT Blood Glucose.: 147 mg/dL (2022 20:54)  POCT Blood Glucose.: 109 mg/dL (2022 20:34)  POCT Blood Glucose.: 77 mg/dL (2022 20:16)  POCT Blood Glucose.: 133 mg/dL (2022 18:50)  POCT Blood Glucose.: 200 mg/dL (2022 18:20)  POCT Blood Glucose.: 119 mg/dL (2022 16:59)  POCT Blood Glucose.: 116 mg/dL (2022 15:53)  POCT Blood Glucose.: 160 mg/dL (2022 14:56)  POCT Blood Glucose.: 201 mg/dL (2022 14:24)  POCT Blood Glucose.: 140 mg/dL (2022 13:03)          acetaminophen     Tablet .. 650 milliGRAM(s) Oral every 6 hours PRN  apixaban 2.5 milliGRAM(s) Oral every 12 hours  ascorbic acid 500 milliGRAM(s) Oral two times a day  aspirin enteric coated 81 milliGRAM(s) Oral daily  atorvastatin 80 milliGRAM(s) Oral at bedtime  chlorhexidine 2% Cloths 1 Application(s) Topical daily  dextrose 50% Injectable 25 Gram(s) IV Push once  dextrose 50% Injectable 12.5 Gram(s) IV Push once  dextrose 50% Injectable 25 Gram(s) IV Push once  dextrose Oral Gel 15 Gram(s) Oral once  furosemide    Tablet 20 milliGRAM(s) Oral daily  gabapentin 100 milliGRAM(s) Oral every 8 hours  glucagon  Injectable 1 milliGRAM(s) IntraMuscular once  hydrALAZINE 25 milliGRAM(s) Oral every 8 hours  insulin glargine Injectable (LANTUS) 20 Unit(s) SubCutaneous at bedtime  insulin lispro (ADMELOG) corrective regimen sliding scale   SubCutaneous Before meals and at bedtime  insulin lispro Injectable (ADMELOG) 8 Unit(s) SubCutaneous three times a day before meals  metoclopramide Injectable 10 milliGRAM(s) IV Push every 8 hours  mupirocin 2% Ointment 1 Application(s) Both Nostrils two times a day  oxyCODONE    IR 10 milliGRAM(s) Oral every 4 hours PRN  oxyCODONE    IR 5 milliGRAM(s) Oral every 4 hours PRN  pantoprazole    Tablet 40 milliGRAM(s) Oral before breakfast  polyethylene glycol 3350 17 Gram(s) Oral daily  senna 2 Tablet(s) Oral at bedtime  sodium chloride 0.9%. 1000 milliLiter(s) IV Continuous <Continuous>  spironolactone 25 milliGRAM(s) Oral daily  tamsulosin 0.4 milliGRAM(s) Oral at bedtime                            8.8    19.03 )-----------( 134      ( 2022 00:34 )             26.7     06-    139  |  105  |  23  ----------------------------<  120<H>  4.2   |  26  |  0.76    Ca    9.0      2022 00:39  Phos  2.3     06-  Mg     2.3         TPro  6.2  /  Alb  3.6  /  TBili  0.5  /  DBili  x   /  AST  38  /  ALT  33  /  AlkPhos  91      PT/INR - ( 2022 02:57 )   PT: 16.3 sec;   INR: 1.41 ratio         PTT - ( 2022 02:57 )  PTT:34.4 sec    PHYSICAL EXAM    Subjective: "I feel good"   General: well appearing male, in no acute distress, sitting in chair. POD #3  Neurology: alert and oriented x 3, nonfocal, no gross deficits  CV : S1/S2, no murmurs/rubs/gallops  Sternal Wound : CDI with dressing, Stable, +PW 40/10  Lungs: clear. RR easy, unlabored   Abdomen: soft, nontender, nondistended, positive bowel sounds, -bowel movement x 3 days, +flatus, Neg N/V/D   :  pt voiding without difficulty   Extremities: SHELBY; no edema, neg calf tenderness. PPP bilaterally, groins stable        Pacing Wires        [ x ]   Settings:  VVI 40/10                                Isolated  [  ]    Coumadin    [ ] YES          [x]      NO         Eliquis    [x ] YES  2.5mg BID        [ ]      NO       Heparin gtt   [  ] YES              [x]   NO  Dose:    Disposition:  Home [   ]     Rehab [   ]       OR Date:    Plan of care discussed with Cardiothoracic Team at AM rounds.

## 2022-06-26 NOTE — CONSULT NOTE ADULT - PROBLEM SELECTOR RECOMMENDATION 9
Will start Lantus 20 units at bed time.  Will start Admelog 8 units before each meal in addition to Admelog correction scale coverage.  Patient counseled for compliance with consistent low carb diet.

## 2022-06-26 NOTE — PROGRESS NOTE ADULT - SUBJECTIVE AND OBJECTIVE BOX
FMLA PAPERWORK PLACED ON CL'S DESK AND CHARGE SENT TO [address] [place] [address] Drive  Date of Service   06-26-22 @ 09:56    Patient is a 81y old  Male who presents with a chief complaint of CAD, left main disease (26 Jun 2022 06:03)      INTERVAL HISTORY: pt feels ok   TELEMETRY Personally reviewed: SR 70's     REVIEW OF SYSTEMS:   CONSTITUTIONAL: No weakness  EYES/ENT: No visual changes; No throat pain  Neck: No pain or stiffness  Respiratory: No cough, wheezing, No shortness of breath  CARDIOVASCULAR: no chest pain or palpitations  GASTROINTESTINAL: No abdominal pain, no nausea, vomiting or hematemesis  GENITOURINARY: No dysuria, frequency or hematuria  NEUROLOGICAL: No stroke like symptoms  SKIN: No rashes    	  MEDICATIONS:  furosemide    Tablet 20 milliGRAM(s) Oral daily  hydrALAZINE 25 milliGRAM(s) Oral every 8 hours  milrinone Infusion 0.2 MICROgram(s)/kG/Min IV Continuous <Continuous>  spironolactone 25 milliGRAM(s) Oral daily  tamsulosin 0.4 milliGRAM(s) Oral at bedtime        PHYSICAL EXAM:  T(C): 36.6 (06-26-22 @ 08:00), Max: 37.8 (06-25-22 @ 16:00)  HR: 78 (06-26-22 @ 09:00) (65 - 78)  BP: 120/60 (06-26-22 @ 09:00) (110/58 - 150/81)  RR: 22 (06-26-22 @ 09:00) (10 - 37)  SpO2: 100% (06-26-22 @ 09:00) (98% - 100%)  Wt(kg): --  I&O's Summary    25 Jun 2022 07:01  -  26 Jun 2022 07:00  --------------------------------------------------------  IN: 1187.8 mL / OUT: 1245 mL / NET: -57.2 mL    26 Jun 2022 07:01  -  26 Jun 2022 09:56  --------------------------------------------------------  IN: 20 mL / OUT: 450 mL / NET: -430 mL          Appearance: In no distress	  HEENT:    PERRL, EOMI	  Cardiovascular:  S1 S2, No JVD  Respiratory: Lungs clear to auscultation	  Gastrointestinal:  Soft, Non-tender, + BS	  Vasculature:  No edema of LE  Psychiatric: Appropriate affect   Neuro: no acute focal deficits                               8.8    19.03 )-----------( 134      ( 26 Jun 2022 00:34 )             26.7     06-26    139  |  105  |  23  ----------------------------<  120<H>  4.2   |  26  |  0.76    Ca    9.0      26 Jun 2022 00:39  Phos  2.3     06-26  Mg     2.3     06-26    TPro  6.2  /  Alb  3.6  /  TBili  0.5  /  DBili  x   /  AST  38  /  ALT  33  /  AlkPhos  91  06-26        Labs personally reviewed      ASSESSMENT/PLAN: 	  80 yo male with cad s/p stent x2 on plavix, ischemic cardiomyopathy w/ mild LV dysfunction, HLD, CHF, KIMMY, paroxysmal afib (not on ac due to bleed in the past?), HTN, DM2 presents to the ER with acute R sided chest pressure starting around 11AM today. Admitted for further evaluation due to elevated cardiac enzymes.    Pt s/p cardiac catherization with evidence of CAD with left main disease. PT transferred to [address] for surgical evaluation with Dr. Perez. Labs and imaging to be reviewed with Dr. Perez.     Problem/Recommendation - 1:  ·  Problem; CAD   - EKG noted   - s/p LHC showing CAD of left main   - TTE shows EF 45%  and Severe mitral regurgitation with an eccentric, anteriorly directed jet.  Mild to moderate segmental left ventricular systolic dysfunction.  Hypokinesis of the lateral, inferolateral, and basal inferior walls  - Post op CABG and MV annuloplasty  - off milrinone gtt   - c/w ASA 81, Atorvastatin 80, Coreg 6.25 BID and  Imdur 30mg QD     Problem/Recommendation - 2:  ·  Problem: Systolic HF (heart failure).   - TTE as noted above   - c/w lasix   - Imdur, bb, on hold post op  - Monitor BP   - off Milrinone gtt      Problem/Recommendation - 3:  ·  Problem; HTN   - BP stable off gtts   - on spironolactone, lasix and hydral       Problem/Recommendation - 4:  ·  Problem: DM   - ISS       Problem/Recommendation - 5:  ·  Problem: HLD   - c/w statin           Imani Condon FN-BC   Prasanna Scott DO [facility]  Cardiovascular Medicine  [address] [place] [address], [address]  Office: [phone]

## 2022-06-26 NOTE — PROGRESS NOTE ADULT - PROBLEM SELECTOR PLAN 1
s/p CABG x 2 with Dr. Perez on 6/23/22  c/w ASA 81, Atorvastatin 80  Hold BB due to prolonged inotropic support  +PW 40/10  ERP Protocol- Benito/Vit C/Amio  Incentive spirometry x 10 q 1hr, pulmonary toileting, increase ambulation, PT eval

## 2022-06-26 NOTE — PROGRESS NOTE ADULT - SUBJECTIVE AND OBJECTIVE BOX
CRITICAL CARE ATTENDING - CTICU    MEDICATIONS  (STANDING):  acetaminophen     Tablet .. 650 milliGRAM(s) Oral every 6 hours  ascorbic acid 500 milliGRAM(s) Oral two times a day  aspirin enteric coated 81 milliGRAM(s) Oral daily  atorvastatin 80 milliGRAM(s) Oral at bedtime  bisacodyl Suppository 10 milliGRAM(s) Rectal once  chlorhexidine 2% Cloths 1 Application(s) Topical daily  enoxaparin Injectable 40 milliGRAM(s) SubCutaneous every 24 hours  furosemide    Tablet 20 milliGRAM(s) Oral daily  gabapentin 100 milliGRAM(s) Oral every 8 hours  hydrALAZINE 25 milliGRAM(s) Oral every 8 hours  insulin glargine Injectable (LANTUS) 8 Unit(s) SubCutaneous at bedtime  insulin regular Infusion 3 Unit(s)/Hr (3 mL/Hr) IV Continuous <Continuous>  metoclopramide Injectable 10 milliGRAM(s) IV Push every 8 hours  milrinone Infusion 0.2 MICROgram(s)/kG/Min (3.96 mL/Hr) IV Continuous <Continuous>  mupirocin 2% Ointment 1 Application(s) Both Nostrils two times a day  pantoprazole    Tablet 40 milliGRAM(s) Oral before breakfast  polyethylene glycol 3350 17 Gram(s) Oral daily  senna 2 Tablet(s) Oral at bedtime  sodium chloride 0.9%. 1000 milliLiter(s) (10 mL/Hr) IV Continuous <Continuous>  spironolactone 25 milliGRAM(s) Oral daily  tamsulosin 0.4 milliGRAM(s) Oral at bedtime                            8.8    19.03 )-----------( 134      ( 2022 00:34 )             26.7           139  |  105  |  23  ----------------------------<  120<H>  4.2   |  26  |  0.76    Ca    9.0      2022 00:39  Phos  2.3       Mg     2.3         TPro  6.2  /  Alb  3.6  /  TBili  0.5  /  DBili  x   /  AST  38  /  ALT  33  /  AlkPhos  91        PT/INR - ( 2022 02:57 )   PT: 16.3 sec;   INR: 1.41 ratio         PTT - ( 2022 02:57 )  PTT:34.4 sec        Daily     Daily Weight in k (2022 00:00)       @ 07:  -   @ 07:00  --------------------------------------------------------  IN: 1020.4 mL / OUT: 2925 mL / NET: -1904.6 mL     @ 07:01  -   @ 06:03  --------------------------------------------------------  IN: 1105.3 mL / OUT: 1020 mL / NET: 85.3 mL      Critically Ill patient  : [ ] preoperative ,   [x] post operative    Requires :  [x ] Arterial Line   [x] Central Line  [ ] PA catheter  [ ] IABP  [ ] ECMO  [ ] LVAD  [ ] Ventilator  [x] pacemaker- TPM [ ] Impella.                      [x ] ABG's     [ x] Pulse Oxymetry Monitoring  Bedside evaluation , monitoring , treatment of hemodynamics , fluids , IVP/ IVCD meds.        Diagnosis:     POD 3 - C2L/MVr    Temporary pacemaker (TPM) interrogation and setting.     Chest tube drainage     Requires chest PT, pulmonary toilet, suctioning to maintain SaO2,  patent airway and treat atelectasis.     CHF- acute [ x]   chronic [ x]    systolic [ x]   diatolic [ ]          - Echo- EF - 45's              [ ] RV dysfunction          - Cxr-cardiomegally, edema          - Clinical-  [x]inotropes   [ ]pressors   [x ]diuresis   [ ]IABP   [ ]ECMO   [ ]LVAD   [ ]Respiratory Failure.     Hemodynamic lability,  instability. Requires IVCD [ ] vasopressors [x ] inotropes  [ ] vasodilator  [x ]IVSS fluid  to maintain MAP, perfusion, C.I.     DM2 - IVCD Insulin + Lantus     BPH - c/w Flomax     Fluid  overload     Thrombocytopenia     Requires bedside physical therapy, mobilization and total shelter care.             By signing my name below, I, Pascale Katz, attest that this documentation has been prepared under the direction and in the presence of Barry Sarah MD.   Electronically Signed: Mini Mosley 06-26-22 @ 06:03      Discussed with CT surgeon, Physician Assistant - Nurse Practitioner- Critical care medicine team.   Discussed at  AM / PM rounds.   Chart, labs , films reviewed.    Cumulative Critical Care Time Given Today:  CRITICAL CARE ATTENDING - CTICU    MEDICATIONS  (STANDING):  acetaminophen     Tablet .. 650 milliGRAM(s) Oral every 6 hours  ascorbic acid 500 milliGRAM(s) Oral two times a day  aspirin enteric coated 81 milliGRAM(s) Oral daily  atorvastatin 80 milliGRAM(s) Oral at bedtime  bisacodyl Suppository 10 milliGRAM(s) Rectal once  chlorhexidine 2% Cloths 1 Application(s) Topical daily  enoxaparin Injectable 40 milliGRAM(s) SubCutaneous every 24 hours  furosemide    Tablet 20 milliGRAM(s) Oral daily  gabapentin 100 milliGRAM(s) Oral every 8 hours  hydrALAZINE 25 milliGRAM(s) Oral every 8 hours  insulin glargine Injectable (LANTUS) 8 Unit(s) SubCutaneous at bedtime  insulin regular Infusion 3 Unit(s)/Hr (3 mL/Hr) IV Continuous <Continuous>  metoclopramide Injectable 10 milliGRAM(s) IV Push every 8 hours  milrinone Infusion 0.2 MICROgram(s)/kG/Min (3.96 mL/Hr) IV Continuous <Continuous>  mupirocin 2% Ointment 1 Application(s) Both Nostrils two times a day  pantoprazole    Tablet 40 milliGRAM(s) Oral before breakfast  polyethylene glycol 3350 17 Gram(s) Oral daily  senna 2 Tablet(s) Oral at bedtime  sodium chloride 0.9%. 1000 milliLiter(s) (10 mL/Hr) IV Continuous <Continuous>  spironolactone 25 milliGRAM(s) Oral daily  tamsulosin 0.4 milliGRAM(s) Oral at bedtime                            8.8    19.03 )-----------( 134      ( 2022 00:34 )             26.7           139  |  105  |  23  ----------------------------<  120<H>  4.2   |  26  |  0.76    Ca    9.0      2022 00:39  Phos  2.3       Mg     2.3         TPro  6.2  /  Alb  3.6  /  TBili  0.5  /  DBili  x   /  AST  38  /  ALT  33  /  AlkPhos  91        PT/INR - ( 2022 02:57 )   PT: 16.3 sec;   INR: 1.41 ratio         PTT - ( 2022 02:57 )  PTT:34.4 sec        Daily     Daily Weight in k (2022 00:00)       @ 07:  -   @ 07:00  --------------------------------------------------------  IN: 1020.4 mL / OUT: 2925 mL / NET: -1904.6 mL     @ 07:01  -   @ 06:03  --------------------------------------------------------  IN: 1105.3 mL / OUT: 1020 mL / NET: 85.3 mL      Critically Ill patient  : [ ] preoperative ,   [x] post operative    Requires :  [x ] Arterial Line   [x] Central Line  [ ] PA catheter  [ ] IABP  [ ] ECMO  [ ] LVAD  [ ] Ventilator  [x] pacemaker- TPM [ ] Impella.                      [x ] ABG's     [ x] Pulse Oxymetry Monitoring  Bedside evaluation , monitoring , treatment of hemodynamics , fluids , IVP/ IVCD meds.        Diagnosis:     POD 3 - C2L/MVr    Temporary pacemaker (TPM) interrogation and setting.     Chest tube drainage     Requires chest PT, pulmonary toilet, suctioning to maintain SaO2,  patent airway and treat atelectasis.     CHF- acute [ x]   chronic [ x]    systolic [ x]   diatolic [ ]          - Echo- EF - 45's              [ ] RV dysfunction          - Cxr-cardiomegally, edema          - Clinical-  [x]inotropes   [ ]pressors   [x ]diuresis   [ ]IABP   [ ]ECMO   [ ]LVAD   [ ]Respiratory Failure.     Hemodynamic lability,  instability. Requires IVCD [ ] vasopressors [x ] inotropes  [ ] vasodilator  [x ]IVSS fluid  to maintain MAP, perfusion, C.I.     DM2 - IVCD Insulin + Lantus     BPH - c/w Flomax     Fluid  overload     Thrombocytopenia     Requires bedside physical therapy, mobilization and total [facility] care.     Weaning off inotropes            By signing my name below, I, Pascale Katz, attest that this documentation has been prepared under the direction and in the presence of Barry Sarah MD.   Electronically Signed: Mini Mosley - @ 06:03    I, Barry Sarah, personally performed the services described in this documentation. All medical record entries made by the scribe were at my direction and in my presence. I have reviewed the chart and agree that the record reflects my personal performance and is accurate and complete.   Barry Sarah MD.       Discussed with CT surgeon, Physician Assistant - Nurse Practitioner- Critical care medicine team.   Discussed at  AM / PM rounds.   Chart, labs , films reviewed.    Cumulative Critical Care Time Given Today:  30 min

## 2022-06-27 LAB
ANION GAP SERPL CALC-SCNC: 7 MMOL/L — SIGNIFICANT CHANGE UP (ref 5–17)
BUN SERPL-MCNC: 15 MG/DL — SIGNIFICANT CHANGE UP (ref 7–23)
CALCIUM SERPL-MCNC: 8.9 MG/DL — SIGNIFICANT CHANGE UP (ref 8.4–10.5)
CHLORIDE SERPL-SCNC: 105 MMOL/L — SIGNIFICANT CHANGE UP (ref 96–108)
CO2 SERPL-SCNC: 26 MMOL/L — SIGNIFICANT CHANGE UP (ref 22–31)
CREAT SERPL-MCNC: 0.75 MG/DL — SIGNIFICANT CHANGE UP (ref 0.5–1.3)
EGFR: 92 ML/MIN/1.73M2 — SIGNIFICANT CHANGE UP
GLUCOSE BLDC GLUCOMTR-MCNC: 109 MG/DL — HIGH (ref 70–99)
GLUCOSE BLDC GLUCOMTR-MCNC: 138 MG/DL — HIGH (ref 70–99)
GLUCOSE BLDC GLUCOMTR-MCNC: 183 MG/DL — HIGH (ref 70–99)
GLUCOSE BLDC GLUCOMTR-MCNC: 93 MG/DL — SIGNIFICANT CHANGE UP (ref 70–99)
GLUCOSE SERPL-MCNC: 103 MG/DL — HIGH (ref 70–99)
HCT VFR BLD CALC: 26.9 % — LOW (ref 39–50)
HGB BLD-MCNC: 8.8 G/DL — LOW (ref 13–17)
MCHC RBC-ENTMCNC: 27.2 PG — SIGNIFICANT CHANGE UP (ref 27–34)
MCHC RBC-ENTMCNC: 32.7 GM/DL — SIGNIFICANT CHANGE UP (ref 32–36)
MCV RBC AUTO: 83 FL — SIGNIFICANT CHANGE UP (ref 80–100)
NRBC # BLD: 0 /100 WBCS — SIGNIFICANT CHANGE UP (ref 0–0)
PLATELET # BLD AUTO: 142 K/UL — LOW (ref 150–400)
POTASSIUM SERPL-MCNC: 4 MMOL/L — SIGNIFICANT CHANGE UP (ref 3.5–5.3)
POTASSIUM SERPL-SCNC: 4 MMOL/L — SIGNIFICANT CHANGE UP (ref 3.5–5.3)
RBC # BLD: 3.24 M/UL — LOW (ref 4.2–5.8)
RBC # FLD: 15.6 % — HIGH (ref 10.3–14.5)
SODIUM SERPL-SCNC: 138 MMOL/L — SIGNIFICANT CHANGE UP (ref 135–145)
WBC # BLD: 12.3 K/UL — HIGH (ref 3.8–10.5)
WBC # FLD AUTO: 12.3 K/UL — HIGH (ref 3.8–10.5)

## 2022-06-27 PROCEDURE — 93010 ELECTROCARDIOGRAM REPORT: CPT

## 2022-06-27 RX ORDER — FUROSEMIDE 40 MG
20 TABLET ORAL ONCE
Refills: 0 | Status: COMPLETED | OUTPATIENT
Start: 2022-06-27 | End: 2022-06-27

## 2022-06-27 RX ORDER — METOPROLOL TARTRATE 50 MG
25 TABLET ORAL DAILY
Refills: 0 | Status: DISCONTINUED | OUTPATIENT
Start: 2022-06-27 | End: 2022-06-29

## 2022-06-27 RX ORDER — FUROSEMIDE 40 MG
40 TABLET ORAL DAILY
Refills: 0 | Status: DISCONTINUED | OUTPATIENT
Start: 2022-06-28 | End: 2022-06-29

## 2022-06-27 RX ADMIN — Medication 25 MILLIGRAM(S): at 22:20

## 2022-06-27 RX ADMIN — Medication 8 UNIT(S): at 16:33

## 2022-06-27 RX ADMIN — Medication 25 MILLIGRAM(S): at 13:07

## 2022-06-27 RX ADMIN — TAMSULOSIN HYDROCHLORIDE 0.4 MILLIGRAM(S): 0.4 CAPSULE ORAL at 22:20

## 2022-06-27 RX ADMIN — APIXABAN 2.5 MILLIGRAM(S): 2.5 TABLET, FILM COATED ORAL at 17:30

## 2022-06-27 RX ADMIN — Medication 2: at 11:36

## 2022-06-27 RX ADMIN — OXYCODONE HYDROCHLORIDE 5 MILLIGRAM(S): 5 TABLET ORAL at 13:11

## 2022-06-27 RX ADMIN — MUPIROCIN 1 APPLICATION(S): 20 OINTMENT TOPICAL at 06:08

## 2022-06-27 RX ADMIN — Medication 8 UNIT(S): at 08:11

## 2022-06-27 RX ADMIN — APIXABAN 2.5 MILLIGRAM(S): 2.5 TABLET, FILM COATED ORAL at 06:09

## 2022-06-27 RX ADMIN — GABAPENTIN 100 MILLIGRAM(S): 400 CAPSULE ORAL at 22:20

## 2022-06-27 RX ADMIN — INSULIN GLARGINE 20 UNIT(S): 100 INJECTION, SOLUTION SUBCUTANEOUS at 22:20

## 2022-06-27 RX ADMIN — PANTOPRAZOLE SODIUM 40 MILLIGRAM(S): 20 TABLET, DELAYED RELEASE ORAL at 06:09

## 2022-06-27 RX ADMIN — Medication 20 MILLIGRAM(S): at 06:09

## 2022-06-27 RX ADMIN — CHLORHEXIDINE GLUCONATE 1 APPLICATION(S): 213 SOLUTION TOPICAL at 13:04

## 2022-06-27 RX ADMIN — SPIRONOLACTONE 25 MILLIGRAM(S): 25 TABLET, FILM COATED ORAL at 06:09

## 2022-06-27 RX ADMIN — Medication 81 MILLIGRAM(S): at 11:34

## 2022-06-27 RX ADMIN — GABAPENTIN 100 MILLIGRAM(S): 400 CAPSULE ORAL at 13:07

## 2022-06-27 RX ADMIN — Medication 20 MILLIGRAM(S): at 08:11

## 2022-06-27 RX ADMIN — SENNA PLUS 2 TABLET(S): 8.6 TABLET ORAL at 22:19

## 2022-06-27 RX ADMIN — MUPIROCIN 1 APPLICATION(S): 20 OINTMENT TOPICAL at 17:32

## 2022-06-27 RX ADMIN — Medication 25 MILLIGRAM(S): at 06:09

## 2022-06-27 RX ADMIN — ATORVASTATIN CALCIUM 80 MILLIGRAM(S): 80 TABLET, FILM COATED ORAL at 22:19

## 2022-06-27 RX ADMIN — GABAPENTIN 100 MILLIGRAM(S): 400 CAPSULE ORAL at 06:08

## 2022-06-27 RX ADMIN — Medication 500 MILLIGRAM(S): at 06:09

## 2022-06-27 RX ADMIN — OXYCODONE HYDROCHLORIDE 5 MILLIGRAM(S): 5 TABLET ORAL at 13:48

## 2022-06-27 RX ADMIN — Medication 8 UNIT(S): at 11:36

## 2022-06-27 RX ADMIN — Medication 25 MILLIGRAM(S): at 08:11

## 2022-06-27 RX ADMIN — Medication 500 MILLIGRAM(S): at 17:29

## 2022-06-27 NOTE — PROGRESS NOTE ADULT - SUBJECTIVE AND OBJECTIVE BOX
VITAL SIGNS    Telemetry:    Vital Signs Last 24 Hrs  T(C): 37.3 (06-27-22 @ 04:55), Max: 37.3 (06-27-22 @ 04:55)  T(F): 99.1 (06-27-22 @ 04:55), Max: 99.1 (06-27-22 @ 04:55)  HR: 88 (06-27-22 @ 04:55) (77 - 88)  BP: 121/82 (06-27-22 @ 04:55) (121/82 - 143/74)  RR: 18 (06-27-22 @ 04:55) (18 - 33)  SpO2: 100% (06-27-22 @ 04:55) (97% - 100%)            06-26 @ 07:01  -  06-27 @ 07:00  --------------------------------------------------------  IN: 720 mL / OUT: 1280 mL / NET: -560 mL       Daily     Daily   Admit Wt: Drug Dosing Weight  Height (cm): 170.2 (23 Jun 2022 06:30)  Weight (kg): 66 (23 Jun 2022 06:30)  BMI (kg/m2): 22.8 (23 Jun 2022 06:30)  BSA (m2): 1.77 (23 Jun 2022 06:30)      CAPILLARY BLOOD GLUCOSE  190 (26 Jun 2022 12:00)      POCT Blood Glucose.: 109 mg/dL (27 Jun 2022 08:03)  POCT Blood Glucose.: 119 mg/dL (26 Jun 2022 21:45)  POCT Blood Glucose.: 115 mg/dL (26 Jun 2022 16:44)  POCT Blood Glucose.: 190 mg/dL (26 Jun 2022 11:45)          acetaminophen     Tablet .. 650 milliGRAM(s) Oral every 6 hours PRN  apixaban 2.5 milliGRAM(s) Oral every 12 hours  ascorbic acid 500 milliGRAM(s) Oral two times a day  aspirin enteric coated 81 milliGRAM(s) Oral daily  atorvastatin 80 milliGRAM(s) Oral at bedtime  chlorhexidine 2% Cloths 1 Application(s) Topical daily  dextrose 50% Injectable 25 Gram(s) IV Push once  dextrose 50% Injectable 12.5 Gram(s) IV Push once  dextrose 50% Injectable 25 Gram(s) IV Push once  dextrose Oral Gel 15 Gram(s) Oral once  gabapentin 100 milliGRAM(s) Oral every 8 hours  glucagon  Injectable 1 milliGRAM(s) IntraMuscular once  hydrALAZINE 25 milliGRAM(s) Oral every 8 hours  insulin glargine Injectable (LANTUS) 20 Unit(s) SubCutaneous at bedtime  insulin lispro (ADMELOG) corrective regimen sliding scale   SubCutaneous Before meals and at bedtime  insulin lispro Injectable (ADMELOG) 8 Unit(s) SubCutaneous three times a day before meals  metoprolol succinate ER 25 milliGRAM(s) Oral daily  mupirocin 2% Ointment 1 Application(s) Both Nostrils two times a day  oxyCODONE    IR 10 milliGRAM(s) Oral every 4 hours PRN  oxyCODONE    IR 5 milliGRAM(s) Oral every 4 hours PRN  pantoprazole    Tablet 40 milliGRAM(s) Oral before breakfast  polyethylene glycol 3350 17 Gram(s) Oral daily  senna 2 Tablet(s) Oral at bedtime  spironolactone 25 milliGRAM(s) Oral daily  tamsulosin 0.4 milliGRAM(s) Oral at bedtime      PHYSICAL EXAM    Subjective: "Hi.   Neurology: alert and oriented x 3, nonfocal, no gross deficits  CV : tele:  RSR  Sternal Wound :  CDI with dressing , Stable  Lungs: clear. RR easy, unlabored   Abdomen: soft, nontender, nondistended, positive bowel sounds, bowel movement   Neg N/V/D   :  pt voiding without difficulty   Extremities:   SHELBY; edema, neg calf tenderness.   PPP bilaterally      PW:  Chest tubes:                 VITAL SIGNS    Telemetry:  rsr 70-80   Vital Signs Last 24 Hrs  T(C): 37.3 (06-27-22 @ 04:55), Max: 37.3 (06-27-22 @ 04:55)  T(F): 99.1 (06-27-22 @ 04:55), Max: 99.1 (06-27-22 @ 04:55)  HR: 88 (06-27-22 @ 04:55) (77 - 88)  BP: 121/82 (06-27-22 @ 04:55) (121/82 - 143/74)  RR: 18 (06-27-22 @ 04:55) (18 - 33)  SpO2: 100% (06-27-22 @ 04:55) (97% - 100%)            06-26 @ 07:01  -  06-27 @ 07:00  --------------------------------------------------------  IN: 720 mL / OUT: 1280 mL / NET: -560 mL       Daily     Daily   Admit Wt: Drug Dosing Weight  Height (cm): 170.2 (23 Jun 2022 06:30)  Weight (kg): 66 (23 Jun 2022 06:30)  BMI (kg/m2): 22.8 (23 Jun 2022 06:30)  BSA (m2): 1.77 (23 Jun 2022 06:30)      CAPILLARY BLOOD GLUCOSE  190 (26 Jun 2022 12:00)      POCT Blood Glucose.: 109 mg/dL (27 Jun 2022 08:03)  POCT Blood Glucose.: 119 mg/dL (26 Jun 2022 21:45)  POCT Blood Glucose.: 115 mg/dL (26 Jun 2022 16:44)  POCT Blood Glucose.: 190 mg/dL (26 Jun 2022 11:45)          acetaminophen     Tablet .. 650 milliGRAM(s) Oral every 6 hours PRN  apixaban 2.5 milliGRAM(s) Oral every 12 hours  ascorbic acid 500 milliGRAM(s) Oral two times a day  aspirin enteric coated 81 milliGRAM(s) Oral daily  atorvastatin 80 milliGRAM(s) Oral at bedtime  chlorhexidine 2% Cloths 1 Application(s) Topical daily  dextrose 50% Injectable 25 Gram(s) IV Push once  dextrose 50% Injectable 12.5 Gram(s) IV Push once  dextrose 50% Injectable 25 Gram(s) IV Push once  dextrose Oral Gel 15 Gram(s) Oral once  gabapentin 100 milliGRAM(s) Oral every 8 hours  glucagon  Injectable 1 milliGRAM(s) IntraMuscular once  hydrALAZINE 25 milliGRAM(s) Oral every 8 hours  insulin glargine Injectable (LANTUS) 20 Unit(s) SubCutaneous at bedtime  insulin lispro (ADMELOG) corrective regimen sliding scale   SubCutaneous Before meals and at bedtime  insulin lispro Injectable (ADMELOG) 8 Unit(s) SubCutaneous three times a day before meals  metoprolol succinate ER 25 milliGRAM(s) Oral daily  mupirocin 2% Ointment 1 Application(s) Both Nostrils two times a day  oxyCODONE    IR 10 milliGRAM(s) Oral every 4 hours PRN  oxyCODONE    IR 5 milliGRAM(s) Oral every 4 hours PRN  pantoprazole    Tablet 40 milliGRAM(s) Oral before breakfast  polyethylene glycol 3350 17 Gram(s) Oral daily  senna 2 Tablet(s) Oral at bedtime  spironolactone 25 milliGRAM(s) Oral daily  tamsulosin 0.4 milliGRAM(s) Oral at bedtime      PHYSICAL EXAM    Subjective: "I feel ok."   Neurology: alert and oriented x 3, nonfocal, no gross deficits  CV : tele:  RSR 70-80   Sternal Wound : cdi melanie- sternum stable; +pw vvi 40   Lungs: clear. RR easy, unlabored   Abdomen: soft, nontender, nondistended, positive bowel sounds, + bowel movement   Neg N/V/D   :  pt voiding without difficulty   Extremities:   SHELBY; trace LE edema, neg calf tenderness.   PPP bilaterally ; rt svg site cdi melanie      PW: + VVI 40   Chest tubes: none

## 2022-06-27 NOTE — PROGRESS NOTE ADULT - PROBLEM SELECTOR PLAN 1
Will continue current insulin regimen for now. Will continue monitoring FS, log, and FU.  Suggest DC on the following DM regimen:  -Janumet 50/1000 BID  -Amaryl 2mg daily AM  -FU Endo 2 weeks  Discussed plan with patient. Counseled for compliance with consistent low carb diet and exercise as tolerated outpatient.

## 2022-06-27 NOTE — PROGRESS NOTE ADULT - PROBLEM SELECTOR PLAN 5
DASH diet  c/w Hydralazine 25mg TID DASH diet  c/w Hydralazine 25mg TID  start toprol 25 qd  monitor vs

## 2022-06-27 NOTE — PROGRESS NOTE ADULT - PROBLEM SELECTOR PLAN 2
s/p Mitral Valve Repair with Dr. Perez on 6/23/22  c/w Lasix 20 QD and Aldactone 25 QD continue asa and statin  initiate toprol 25 qd as per Dr. Perez  increase diuretics  +pw- VVI 40- cut upon discharge   anticoagulation with eliquis 2.5 bid   pulm toilet  pain management  bowel regimen  increase activity as tolerated  Discharge planning- home when stable ?wed continue asa and statin  initiate toprol 25 qd as per Dr. Perez  increase diuretics  +pw- VVI 40- d/c in am   anticoagulation with eliquis 2.5 bid   pulm toilet  pain management  bowel regimen  increase activity as tolerated  Discharge planning- home when stable ?wed

## 2022-06-27 NOTE — PROGRESS NOTE ADULT - PROBLEM SELECTOR PLAN 3
A1C 7.0%  [facility] and qHS  Diabetic Diet  Endocrinology following Dr. Ovidio hernandez/raudel Santos 20u qHS and Admelog 8u [facility] A1C 7.2  FS [facility] and qHS  Diabetic Diet  Endocrinology following Dr. Ovidio hernandez/raudel Lanallisonus 20u qHS and Admelog 8u [facility]

## 2022-06-27 NOTE — PROGRESS NOTE ADULT - ASSESSMENT
Assessment  DMT2: 81y Male with DM T2 with hyperglycemia admitted with chest pain, A1C 7.2%, now on basal bolus insulin, adjusted dose, blood sugars trending within overall acceptable range, no hypoglycemic episodes, eating meals, appears comfortable, DC planning.  CAD: S/P CABG, on medications, monitored, asymptomatic.  HTN: On antihypertensive medications, monitored, asymptomatic.  Overweight: No strict exercise routines, neither on low calorie diet.       Svitlana Nolan MD  Cell: [phone]  Office: [phone]       Assessment  DMT2: 81y Male with DM T2 with hyperglycemia admitted with chest pain, A1C 7.2%, now on basal bolus insulin,  adjusted dose, blood sugars trending within overall acceptable range, no hypoglycemic episodes, eating meals, appears comfortable, DC planning.  CAD: S/P CABG, on medications, monitored, asymptomatic.  HTN: On antihypertensive medications, monitored, asymptomatic.  Overweight: No strict exercise routines, neither on low calorie diet.       Svitlana Nolan MD  Cell: [phone]  Office: [phone]

## 2022-06-27 NOTE — PROGRESS NOTE ADULT - SUBJECTIVE AND OBJECTIVE BOX
Chief complaint  Patient is a 81y old  Male who presents with a chief complaint of CAD, left main disease (27 Jun 2022 09:40)   Review of systems  Patient awake in chair, looks comfortable, no hypoglycemic episodes.    Labs and Fingersticks  CAPILLARY BLOOD GLUCOSE      POCT Blood Glucose.: 183 mg/dL (27 Jun 2022 11:35)  POCT Blood Glucose.: 109 mg/dL (27 Jun 2022 08:03)  POCT Blood Glucose.: 119 mg/dL (26 Jun 2022 21:45)  POCT Blood Glucose.: 115 mg/dL (26 Jun 2022 16:44)      Anion Gap, Serum: 7 (06-27 @ 06:15)  Anion Gap, Serum: 8 (06-26 @ 00:39)      Calcium, Total Serum: 8.9 (06-27 @ 06:15)  Calcium, Total Serum: 9.0 (06-26 @ 00:39)  Albumin, Serum: 3.6 (06-26 @ 00:39)    Alanine Aminotransferase (ALT/SGPT): 33 (06-26 @ 00:39)  Alkaline Phosphatase, Serum: 91 (06-26 @ 00:39)  Aspartate Aminotransferase (AST/SGOT): 38 (06-26 @ 00:39)        06-27    138  |  105  |  15  ----------------------------<  103<H>  4.0   |  26  |  0.75    Ca    8.9      27 Jun 2022 06:15  Phos  2.3     06-26  Mg     2.3     06-26    TPro  6.2  /  Alb  3.6  /  TBili  0.5  /  DBili  x   /  AST  38  /  ALT  33  /  AlkPhos  91  06-26                        8.8    12.30 )-----------( 142      ( 27 Jun 2022 06:15 )             26.9     Medications  MEDICATIONS  (STANDING):  apixaban 2.5 milliGRAM(s) Oral every 12 hours  ascorbic acid 500 milliGRAM(s) Oral two times a day  aspirin enteric coated 81 milliGRAM(s) Oral daily  atorvastatin 80 milliGRAM(s) Oral at bedtime  chlorhexidine 2% Cloths 1 Application(s) Topical daily  dextrose 50% Injectable 25 Gram(s) IV Push once  dextrose 50% Injectable 12.5 Gram(s) IV Push once  dextrose 50% Injectable 25 Gram(s) IV Push once  dextrose Oral Gel 15 Gram(s) Oral once  gabapentin 100 milliGRAM(s) Oral every 8 hours  glucagon  Injectable 1 milliGRAM(s) IntraMuscular once  hydrALAZINE 25 milliGRAM(s) Oral every 8 hours  insulin glargine Injectable (LANTUS) 20 Unit(s) SubCutaneous at bedtime  insulin lispro (ADMELOG) corrective regimen sliding scale   SubCutaneous Before meals and at bedtime  insulin lispro Injectable (ADMELOG) 8 Unit(s) SubCutaneous three times a day before meals  metoprolol succinate ER 25 milliGRAM(s) Oral daily  mupirocin 2% Ointment 1 Application(s) Both Nostrils two times a day  pantoprazole    Tablet 40 milliGRAM(s) Oral before breakfast  polyethylene glycol 3350 17 Gram(s) Oral daily  senna 2 Tablet(s) Oral at bedtime  spironolactone 25 milliGRAM(s) Oral daily  tamsulosin 0.4 milliGRAM(s) Oral at bedtime      Physical Exam  General: Patient comfortable in bed  Vital Signs Last 12 Hrs  T(F): 99.1 (06-27-22 @ 04:55), Max: 99.1 (06-27-22 @ 04:55)  HR: 88 (06-27-22 @ 12:36) (88 - 88)  BP: 121/82 (06-27-22 @ 04:55) (121/82 - 121/82)  BP(mean): --  RR: 18 (06-27-22 @ 12:36) (18 - 18)  SpO2: 100% (06-27-22 @ 12:36) (100% - 100%)  Neck: No palpable thyroid nodules.  CVS: S1S2, No murmurs  Respiratory: No wheezing, no crepitations  GI: Abdomen soft, bowel sounds positive  Musculoskeletal:  edema lower extremities.   Skin: No skin rashes, no ecchymosis    Diagnostics             Chief complaint  Patient is a 81y old  Male who presents with a chief complaint of CAD, left main disease (27 Jun 2022 09:40)   Review of systems  Patient awake in chair, looks comfortable, no hypoglycemic episodes.    Labs and Fingersticks  CAPILLARY BLOOD GLUCOSE      POCT Blood Glucose.: 183 mg/dL (27 Jun 2022 11:35)  POCT Blood Glucose.: 109 mg/dL (27 Jun 2022 08:03)  POCT Blood Glucose.: 119 mg/dL (26 Jun 2022 21:45)  POCT Blood Glucose.: 115 mg/dL (26 Jun 2022 16:44)      Anion Gap, Serum: 7 (06-27 @ 06:15)  Anion Gap, Serum: 8 (06-26 @ 00:39)      Calcium, Total Serum: 8.9 (06-27 @ 06:15)  Calcium, Total Serum: 9.0 (06-26 @ 00:39)  Albumin, Serum: 3.6 (06-26 @ 00:39)    Alanine Aminotransferase (ALT/SGPT): 33 (06-26 @ 00:39)  Alkaline Phosphatase, Serum: 91 (06-26 @ 00:39)  Aspartate Aminotransferase (AST/SGOT): 38 (06-26 @ 00:39)        06-27    138  |  105  |  15  ----------------------------<  103<H>  4.0   |  26  |  0.75    Ca    8.9      27 Jun 2022 06:15  Phos  2.3     06-26  Mg     2.3     06-26    TPro  6.2  /  Alb  3.6  /  TBili  0.5  /  DBili  x   /  AST  38  /  ALT  33  /  AlkPhos  91  06-26                        8.8    12.30 )-----------( 142      ( 27 Jun 2022 06:15 )             26.9     Medications  MEDICATIONS  (STANDING):  apixaban 2.5 milliGRAM(s) Oral every 12 hours  ascorbic acid 500 milliGRAM(s) Oral two times a day  aspirin enteric coated 81 milliGRAM(s) Oral daily  atorvastatin 80 milliGRAM(s) Oral at bedtime  chlorhexidine 2% Cloths 1 Application(s) Topical daily  dextrose 50% Injectable 25 Gram(s) IV Push once  dextrose 50% Injectable 12.5 Gram(s) IV Push once  dextrose 50% Injectable 25 Gram(s) IV Push once  dextrose Oral Gel 15 Gram(s) Oral once  gabapentin 100 milliGRAM(s) Oral every 8 hours  glucagon  Injectable 1 milliGRAM(s) IntraMuscular once  hydrALAZINE 25 milliGRAM(s) Oral every 8 hours  insulin glargine Injectable (LANTUS) 20 Unit(s) SubCutaneous at bedtime  insulin lispro (ADMELOG) corrective regimen sliding scale   SubCutaneous Before meals and at bedtime  insulin lispro Injectable (ADMELOG) 8 Unit(s) SubCutaneous three times a day before meals  metoprolol succinate ER 25 milliGRAM(s) Oral daily  mupirocin 2% Ointment 1 Application(s) Both Nostrils two times a day  pantoprazole    Tablet 40 milliGRAM(s) Oral before breakfast  polyethylene glycol 3350 17 Gram(s) Oral daily  senna 2 Tablet(s) Oral at bedtime  spironolactone 25 milliGRAM(s) Oral daily  tamsulosin 0.4 milliGRAM(s) Oral at bedtime      Physical Exam  General: Patient comfortable in bed  Vital Signs Last 12 Hrs  T(F): 99.1 (06-27-22 @ 04:55), Max: 99.1 (06-27-22 @ 04:55)  HR: 88 (06-27-22 @ 12:36) (88 - 88)  BP: 121/82 (06-27-22 @ 04:55) (121/82 - 121/82)  BP(mean): --  RR: 18 (06-27-22 @ 12:36) (18 - 18)  SpO2: 100% (06-27-22 @ 12:36) (100% - 100%)  Neck: No palpable thyroid nodules.  CVS: S1S2, No murmurs  Respiratory: No wheezing, no crepitations  GI: Abdomen soft, bowel sounds positive  Musculoskeletal:  edema lower extremities.   Skin: No skin rashes, no ecchymosis    Diagnostics

## 2022-06-27 NOTE — PROGRESS NOTE ADULT - ASSESSMENT
80 yo male with cad s/p stent x2 on plavix, ischemic cardiomyopathy w/ mild LV dysfunction, HLD, CHF, KIMMY, paroxysmal afib (not on ac due to bleed in the past?), HTN, DM2 presents to the ER with acute R sided chest pressure starting around 11AM today. Admitted for further evaluation due to elevated cardiac enzymes.  Pt s/p cardiac catherization with evidence of CAD with left main disease. PT transferred to [facility] for surgical evaluation with Dr. Perez. Labs and imaging to be reviewed with Dr. Perez.      6/17: transferred to [address] [place] [address], VSS; acc junct/ rsr 50-60; preop w/u in progress, f/u P2Y12 123 today;  ck echo: mod MR; mild LV systolic dysfunction   continue asa/ statin and bb; add dvt prophylaxis   6/19  vss    p2y12    133  6/20 VSS: pt stable at this time; repeat p2y12 in am; OR planned for thur 6/23 with Dr. Perez   6/21 VSS; RSR 50-60; repeat p2y12 this am 152; OR planned for thur 6/24 with Dr. Perez   6/22 VSS P2Y12 138   cardiac surgery workup completed OR in am  6/23 s/p C2L and MV repair  6/26 Primacor gtt dced at 4am, Insulin gtt dced this AM, transferred to floor, due to void at 845pm, Eliquis 2.5 BID for afib as per Dr. Perez, start BB tomorrow due to prolonged inotrope     80 yo male with cad s/p stent x2 on plavix, ischemic cardiomyopathy w/ mild LV dysfunction, HLD, CHF, KIMMY, paroxysmal afib (not on ac due to bleed in the past?), HTN, DM2 presents to the ER with acute R sided chest pressure starting around 11AM today. Admitted for further evaluation due to elevated cardiac enzymes.  Pt s/p cardiac catherization with evidence of CAD with left main disease. PT transferred to [facility] for surgical evaluation with Dr. Perez. Labs and imaging to be reviewed with Dr. Perez.      6/17: transferred to [address] [place] [address], VSS; acc junct/ rsr 50-60; preop w/u in progress, f/u P2Y12 123 today;  ck echo: mod MR; mild LV systolic dysfunction   continue asa/ statin and bb; add dvt prophylaxis   6/19  vss    p2y12    133  6/20 VSS: pt stable at this time; repeat p2y12 in am; OR planned for thur 6/23 with Dr. Perez   6/21 VSS; RSR 50-60; repeat p2y12 this am 152; OR planned for thur 6/24 with Dr. Perez   6/22 VSS P2Y12 138   cardiac surgery workup completed OR in am  6/23 s/p C2L and MV repair  6/26 Primacor gtt dced at 4am, Insulin gtt dced this AM, transferred to floor, due to void at 845pm, Eliquis 2.5 BID for afib as per Dr. Perez, start BB tomorrow due to prolonged inotrope  6/27 VSS; RSR 70-80; initiate bb today- toprol 25 qd as per Dr. Perez; increase diuretics; continue eliquis for anticoagulation; cut pw for discharge  endo following for glycemic control  Discharge planning- home ?wed/ thur      80 yo male with cad s/p stent x2 on plavix, ischemic cardiomyopathy w/ mild LV dysfunction, HLD, CHF, KIMMY, paroxysmal afib (not on ac due to bleed in the past?), HTN, DM2 presents to the ER with acute R sided chest pressure starting around 11AM today. Admitted for further evaluation due to elevated cardiac enzymes.  Pt s/p cardiac catherization with evidence of CAD with left main disease. PT transferred to [facility] for surgical evaluation with Dr. Perez. Labs and imaging to be reviewed with Dr. Perez.      6/17: transferred to [address] [place] [address], VSS; acc junct/ rsr 50-60; preop w/u in progress, f/u P2Y12 123 today;  ck echo: mod MR; mild LV systolic dysfunction   continue asa/ statin and bb; add dvt prophylaxis   6/19  vss    p2y12    133  6/20 VSS: pt stable at this time; repeat p2y12 in am; OR planned for thur 6/23 with Dr. Perez   6/21 VSS; RSR 50-60; repeat p2y12 this am 152; OR planned for thur 6/24 with Dr. Perez   6/22 VSS P2Y12 138   cardiac surgery workup completed OR in am  6/23 s/p C2L and MV repair  6/26 Primacor gtt dced at 4am, Insulin gtt dced this AM, transferred to floor, due to void at 845pm, Eliquis 2.5 BID for afib as per Dr. Perez, start BB tomorrow due to prolonged inotrope  6/27 VSS; RSR 70-80; initiate bb today- toprol 25 qd as per Dr. Perez; increase diuretics; continue eliquis for anticoagulation; hold eliquis for pw removal 6/28   endo following for glycemic control  Discharge planning- home ?wed/ thur

## 2022-06-27 NOTE — PROGRESS NOTE ADULT - PROBLEM SELECTOR PLAN 1
s/p CABG x 2 with Dr. Perez on 6/23/22  c/w ASA 81, Atorvastatin 80  Hold BB due to prolonged inotropic support  +PW 40/10  ERP Protocol- Benito/Vit C/Amio  Incentive spirometry x 10 q 1hr, pulmonary toileting, increase ambulation, PT eval continue postop care   continue asa and statin  initiate toprol 25 qd as per Dr. Perez  increase diuretics  +pw- VVI 40- cut upon discharge   anticoagulation with eliquis 2.5 bid   pulm toilet  pain management  bowel regimen  increase activity as tolerated  Discharge planning- home when stable ?wed continue postop care   continue asa and statin  initiate toprol 25 qd as per Dr. Perez  increase diuretics  +pw- VVI 40- cut upon discharge   anticoagulation with eliquis 2.5 bid - HOLD for pw removal in am as per Dr. Perez   pulm toilet  pain management  bowel regimen  increase activity as tolerated  Discharge planning- home when stable ?wed

## 2022-06-27 NOTE — PROGRESS NOTE ADULT - PROBLEM SELECTOR PLAN 6
Start Eliquis 2.5 BID as per Dr. Perez continue Eliquis 2.5 BID as per Dr. Perez  start toprol 25 qd continue Eliquis 2.5 BID as per Dr. Perez- hold for pw removal in am   start toprol 25 qd

## 2022-06-27 NOTE — PROGRESS NOTE ADULT - PA/NP ONLY VISIT
PA/NP only visit

## 2022-06-27 NOTE — PROGRESS NOTE ADULT - PROBLEM SELECTOR PLAN 4
c/w diuresis with Lasix 20mg QD and Aldactone 25mg  Strict I&Os  EF 45% c/w diuresis- increase lasix 40 mg poqd & continue Aldactone 25mg  Strict I&Os  daily weight  trend bmp   EF 45%

## 2022-06-28 LAB
ANION GAP SERPL CALC-SCNC: 8 MMOL/L — SIGNIFICANT CHANGE UP (ref 5–17)
BUN SERPL-MCNC: 14 MG/DL — SIGNIFICANT CHANGE UP (ref 7–23)
CALCIUM SERPL-MCNC: 9 MG/DL — SIGNIFICANT CHANGE UP (ref 8.4–10.5)
CHLORIDE SERPL-SCNC: 105 MMOL/L — SIGNIFICANT CHANGE UP (ref 96–108)
CO2 SERPL-SCNC: 25 MMOL/L — SIGNIFICANT CHANGE UP (ref 22–31)
CREAT SERPL-MCNC: 0.84 MG/DL — SIGNIFICANT CHANGE UP (ref 0.5–1.3)
EGFR: 89 ML/MIN/1.73M2 — SIGNIFICANT CHANGE UP
GLUCOSE BLDC GLUCOMTR-MCNC: 126 MG/DL — HIGH (ref 70–99)
GLUCOSE BLDC GLUCOMTR-MCNC: 166 MG/DL — HIGH (ref 70–99)
GLUCOSE BLDC GLUCOMTR-MCNC: 188 MG/DL — HIGH (ref 70–99)
GLUCOSE BLDC GLUCOMTR-MCNC: 97 MG/DL — SIGNIFICANT CHANGE UP (ref 70–99)
GLUCOSE SERPL-MCNC: 103 MG/DL — HIGH (ref 70–99)
HCT VFR BLD CALC: 26.3 % — LOW (ref 39–50)
HGB BLD-MCNC: 8.4 G/DL — LOW (ref 13–17)
MCHC RBC-ENTMCNC: 26.7 PG — LOW (ref 27–34)
MCHC RBC-ENTMCNC: 31.9 GM/DL — LOW (ref 32–36)
MCV RBC AUTO: 83.5 FL — SIGNIFICANT CHANGE UP (ref 80–100)
NRBC # BLD: 0 /100 WBCS — SIGNIFICANT CHANGE UP (ref 0–0)
PLATELET # BLD AUTO: 166 K/UL — SIGNIFICANT CHANGE UP (ref 150–400)
POTASSIUM SERPL-MCNC: 4.7 MMOL/L — SIGNIFICANT CHANGE UP (ref 3.5–5.3)
POTASSIUM SERPL-SCNC: 4.7 MMOL/L — SIGNIFICANT CHANGE UP (ref 3.5–5.3)
RBC # BLD: 3.15 M/UL — LOW (ref 4.2–5.8)
RBC # FLD: 15.7 % — HIGH (ref 10.3–14.5)
SODIUM SERPL-SCNC: 138 MMOL/L — SIGNIFICANT CHANGE UP (ref 135–145)
WBC # BLD: 11.98 K/UL — HIGH (ref 3.8–10.5)
WBC # FLD AUTO: 11.98 K/UL — HIGH (ref 3.8–10.5)

## 2022-06-28 RX ORDER — AMIODARONE HYDROCHLORIDE 400 MG/1
200 TABLET ORAL DAILY
Refills: 0 | Status: DISCONTINUED | OUTPATIENT
Start: 2022-06-28 | End: 2022-06-29

## 2022-06-28 RX ORDER — APIXABAN 2.5 MG/1
2.5 TABLET, FILM COATED ORAL
Refills: 0 | Status: DISCONTINUED | OUTPATIENT
Start: 2022-06-28 | End: 2022-06-29

## 2022-06-28 RX ADMIN — Medication 25 MILLIGRAM(S): at 05:35

## 2022-06-28 RX ADMIN — OXYCODONE HYDROCHLORIDE 5 MILLIGRAM(S): 5 TABLET ORAL at 22:50

## 2022-06-28 RX ADMIN — GABAPENTIN 100 MILLIGRAM(S): 400 CAPSULE ORAL at 15:18

## 2022-06-28 RX ADMIN — Medication 8 UNIT(S): at 08:18

## 2022-06-28 RX ADMIN — POLYETHYLENE GLYCOL 3350 17 GRAM(S): 17 POWDER, FOR SOLUTION ORAL at 10:26

## 2022-06-28 RX ADMIN — SENNA PLUS 2 TABLET(S): 8.6 TABLET ORAL at 22:16

## 2022-06-28 RX ADMIN — OXYCODONE HYDROCHLORIDE 5 MILLIGRAM(S): 5 TABLET ORAL at 12:00

## 2022-06-28 RX ADMIN — TAMSULOSIN HYDROCHLORIDE 0.4 MILLIGRAM(S): 0.4 CAPSULE ORAL at 22:29

## 2022-06-28 RX ADMIN — ATORVASTATIN CALCIUM 80 MILLIGRAM(S): 80 TABLET, FILM COATED ORAL at 22:16

## 2022-06-28 RX ADMIN — GABAPENTIN 100 MILLIGRAM(S): 400 CAPSULE ORAL at 05:35

## 2022-06-28 RX ADMIN — Medication 2: at 12:09

## 2022-06-28 RX ADMIN — CHLORHEXIDINE GLUCONATE 1 APPLICATION(S): 213 SOLUTION TOPICAL at 10:25

## 2022-06-28 RX ADMIN — AMIODARONE HYDROCHLORIDE 200 MILLIGRAM(S): 400 TABLET ORAL at 17:00

## 2022-06-28 RX ADMIN — Medication 81 MILLIGRAM(S): at 10:25

## 2022-06-28 RX ADMIN — Medication 40 MILLIGRAM(S): at 05:34

## 2022-06-28 RX ADMIN — Medication 8 UNIT(S): at 12:10

## 2022-06-28 RX ADMIN — OXYCODONE HYDROCHLORIDE 5 MILLIGRAM(S): 5 TABLET ORAL at 10:28

## 2022-06-28 RX ADMIN — Medication 8 UNIT(S): at 17:22

## 2022-06-28 RX ADMIN — Medication 2: at 17:23

## 2022-06-28 RX ADMIN — SPIRONOLACTONE 25 MILLIGRAM(S): 25 TABLET, FILM COATED ORAL at 05:34

## 2022-06-28 RX ADMIN — Medication 500 MILLIGRAM(S): at 05:35

## 2022-06-28 RX ADMIN — MUPIROCIN 1 APPLICATION(S): 20 OINTMENT TOPICAL at 06:43

## 2022-06-28 RX ADMIN — INSULIN GLARGINE 20 UNIT(S): 100 INJECTION, SOLUTION SUBCUTANEOUS at 22:17

## 2022-06-28 RX ADMIN — PANTOPRAZOLE SODIUM 40 MILLIGRAM(S): 20 TABLET, DELAYED RELEASE ORAL at 05:35

## 2022-06-28 RX ADMIN — APIXABAN 2.5 MILLIGRAM(S): 2.5 TABLET, FILM COATED ORAL at 18:08

## 2022-06-28 RX ADMIN — OXYCODONE HYDROCHLORIDE 5 MILLIGRAM(S): 5 TABLET ORAL at 22:16

## 2022-06-28 NOTE — PROGRESS NOTE ADULT - PROBLEM SELECTOR PLAN 3
A1C 7.2  FS [facility] and qHS  Diabetic Diet  Endocrinology following Dr. Ovidio hernandez/raudel Lanallisonus 20u qHS and Admelog 8u [facility]

## 2022-06-28 NOTE — PROGRESS NOTE ADULT - PROBLEM SELECTOR PLAN 1
continue postop care   continue asa and statin  initiate toprol 25 qd as per Dr. Perez  increase diuretics  +pw- VVI 40- cut upon discharge   anticoagulation with eliquis 2.5 bid - HOLD for pw removal in am as per Dr. Perez   pulm toilet  pain management  bowel regimen  increase activity as tolerated  Discharge planning- home when stable ?wed

## 2022-06-28 NOTE — PROGRESS NOTE ADULT - SUBJECTIVE AND OBJECTIVE BOX
DATE OF SERVICE: 06-28-22 @ 07:55    Patient is a 81y old  Male who presents with a chief complaint of CAD, left main disease (27 Jun 2022 15:17)      INTERVAL HISTORY: Feels well.     REVIEW OF SYSTEMS:  CONSTITUTIONAL: No weakness  EYES/ENT: No visual changes;  No throat pain   NECK: No pain or stiffness  RESPIRATORY: No cough, wheezing; No shortness of breath  CARDIOVASCULAR: No chest pain or palpitations  GASTROINTESTINAL: No abdominal  pain. No nausea, vomiting, or hematemesis  GENITOURINARY: No dysuria, frequency or hematuria  NEUROLOGICAL: No stroke like symptoms  SKIN: No rashes    TELEMETRY Personally reviewed: SR 1st AVB at 60-80, PAC/PVC  	  MEDICATIONS:  furosemide    Tablet 40 milliGRAM(s) Oral daily  hydrALAZINE 25 milliGRAM(s) Oral every 8 hours  metoprolol succinate ER 25 milliGRAM(s) Oral daily  spironolactone 25 milliGRAM(s) Oral daily  tamsulosin 0.4 milliGRAM(s) Oral at bedtime        PHYSICAL EXAM:  T(C): 37.2 (06-28-22 @ 04:29), Max: 37.5 (06-27-22 @ 20:41)  HR: 64 (06-28-22 @ 04:29) (64 - 89)  BP: 108/71 (06-28-22 @ 04:29) (108/71 - 128/80)  RR: 18 (06-28-22 @ 04:29) (18 - 18)  SpO2: 98% (06-28-22 @ 04:29) (98% - 100%)  Wt(kg): --  I&O's Summary    27 Jun 2022 07:01  -  28 Jun 2022 07:00  --------------------------------------------------------  IN: 240 mL / OUT: 1300 mL / NET: -1060 mL          Appearance: In no distress	  HEENT:    PERRL, EOMI	  Cardiovascular:  S1 S2, No JVD  Respiratory: Lungs clear to auscultation	  Gastrointestinal:  Soft, Non-tender, + BS	  Vascularature:  No edema of LE  Psychiatric: Appropriate affect   Neuro: no acute focal deficits                               8.4    11.98 )-----------( 166      ( 28 Jun 2022 06:11 )             26.3     06-28    138  |  105  |  14  ----------------------------<  103<H>  4.7   |  25  |  0.84    Ca    9.0      28 Jun 2022 06:13          Labs personally reviewed      ASSESSMENT/PLAN: 	    82 yo male with cad s/p stent x2 on plavix, ischemic cardiomyopathy w/ mild LV dysfunction, HLD, CHF, KIMMY, paroxysmal afib (not on ac due to bleed in the past?), HTN, DM2 presents to the ER with acute R sided chest pressure starting around 11AM today. Admitted for further evaluation due to elevated cardiac enzymes.    Pt s/p cardiac catherization with evidence of CAD with left main disease. PT transferred to [address] for surgical evaluation with Dr. Perez. Labs and imaging to be reviewed with Dr. Perez.     Problem/Recommendation - 1:  ·  Problem; CAD   - EKG noted   - s/p C showing CAD of left main   - TTE shows EF 45%  and Severe mitral regurgitation with an eccentric, anteriorly directed jet.  Mild to moderate segmental left ventricular systolic dysfunction.  Hypokinesis of the lateral, inferolateral, and basal inferior walls  - Post op CABG and MV annuloplasty  - c/w ASA 81, Atorvastatin 80, hydrALAZINE 25 mgTID, metoprolol succinate ER 25 mg QD, and spironolactone 25 mg QD      Problem/Recommendation - 2:  ·  Problem: Systolic HF (heart failure).   - TTE as noted above   - appears euvolemic   - Monitor BP   - on Metoprolol 25mg QD, Spironolactone 25mg QD       Problem/Recommendation - 3:  ·  Problem; HTN   - BP stable off gtts   - on spironolactone, lasix and hydral       Problem/Recommendation - 4:  ·  Problem: DM   - ISS       Problem/Recommendation - 5:  ·  Problem: HLD   - c/w statin         Beba Arias, AG-NP   Prasanna Scott DO [facility]  Cardiovascular Medicine  [address], [address]  Office: [phone]  Cell: [phone]

## 2022-06-28 NOTE — PROGRESS NOTE ADULT - PROBLEM SELECTOR PLAN 2
continue asa and statin  initiate toprol 25 qd as per Dr. Perez  increase diuretics  +pw- VVI 40- d/c in am   anticoagulation with eliquis 2.5 bid   pulm toilet  pain management  bowel regimen  increase activity as tolerated  Discharge planning- home when stable ?wed

## 2022-06-28 NOTE — PROGRESS NOTE ADULT - ASSESSMENT
82 yo male with cad s/p stent x2 on plavix, ischemic cardiomyopathy w/ mild LV dysfunction, HLD, CHF, KIMMY, paroxysmal afib (not on ac due to bleed in the past?), HTN, DM2 presents to the ER with acute R sided chest pressure starting around 11AM today. Admitted for further evaluation due to elevated cardiac enzymes.  Pt s/p cardiac catherization with evidence of CAD with left main disease. PT transferred to [facility] for surgical evaluation with Dr. Perez. Labs and imaging to be reviewed with Dr. Perez.      6/17: transferred to [address] [place] [address], VSS; acc junct/ rsr 50-60; preop w/u in progress, f/u P2Y12 123 today;  ck echo: mod MR; mild LV systolic dysfunction   continue asa/ statin and bb; add dvt prophylaxis   6/19  vss    p2y12    133  6/20 VSS: pt stable at this time; repeat p2y12 in am; OR planned for thur 6/23 with Dr. Perez   6/21 VSS; RSR 50-60; repeat p2y12 this am 152; OR planned for thur 6/24 with Dr. Perez   6/22 VSS P2Y12 138   cardiac surgery workup completed OR in am  6/23 s/p C2L and MV repair  6/26 Primacor gtt dced at 4am, Insulin gtt dced this AM, transferred to floor, due to void at 845pm, Eliquis 2.5 BID for afib as per Dr. Perez, start BB tomorrow due to prolonged inotrope  6/27 VSS; RSR 70-80; initiate bb today- toprol 25 qd as per Dr. Perez; increase diuretics; continue eliquis for anticoagulation; hold eliquis for pw removal 6/28   endo following for glycemic control  6/28 VSS eliquis on hold - pws removed.  Eliquis to be resumed tonight.  d/c plan home tomorrow

## 2022-06-28 NOTE — PROGRESS NOTE ADULT - ASSESSMENT
Assessment  DMT2: 81y Male with DM T2 with hyperglycemia admitted with chest pain, A1C 7.2%, now on basal bolus insulin, blood sugars trending within overall acceptable range, no hypoglycemic episodes, eating meals, appears comfortable, planning DC home tomorrow.  CAD: S/P CABG, on medications, monitored, asymptomatic.  HTN: On antihypertensive medications, monitored, asymptomatic.  Overweight: No strict exercise routines, neither on low calorie diet.       Svitlana Nolan MD  Cell: [phone]  Office: [phone]       Assessment  DMT2: 81y Male with DM T2 with hyperglycemia admitted with chest pain, A1C 7.2%, now on basal bolus insulin, blood sugars trending within overall acceptable range, no hypoglycemic episodes, eating meals, appears comfortable,  planning DC home tomorrow.  CAD: S/P CABG, on medications, monitored, asymptomatic.  HTN: On antihypertensive medications, monitored, asymptomatic.  Overweight: No strict exercise routines, neither on low calorie diet.       Svitlana Nolan MD  Cell: [phone]  Office: [phone]

## 2022-06-28 NOTE — PROGRESS NOTE ADULT - SUBJECTIVE AND OBJECTIVE BOX
VITAL SIGNS-Telemetry:    Vital Signs Last 24 Hrs  T(C): 37.2 (22 @ 04:29), Max: 37.5 (22 @ 20:41)  T(F): 99 (22 @ 04:29), Max: 99.5 (22 @ 20:41)  HR: 64 (22 @ 04:29) (64 - 89)  BP: 108/71 (22 @ 04:29) (108/71 - 128/80)  RR: 18 (22 @ 04:29) (18 - 18)  SpO2: 98% (22 @ 04:29) (98% - 100%)          @ 07:01  -   @ 07:00  --------------------------------------------------------  IN: 240 mL / OUT: 1300 mL / NET: -1060 mL        Daily     Daily Weight in k.5 (2022 04:29)    CAPILLARY BLOOD GLUCOSE      POCT Blood Glucose.: 188 mg/dL (2022 11:52)  POCT Blood Glucose.: 126 mg/dL (2022 08:02)  POCT Blood Glucose.: 138 mg/dL (2022 21:42)  POCT Blood Glucose.: 93 mg/dL (2022 16:29)          Drains:     MS         [  ] Drainage:                 L Pleural  [  ]  Drainage:                R Pleural  [  ]  Drainage:  Pacing Wires        [  ]   Settings:                                  Isolated  [  ]  Coumadin    [ ] YES          [  ]      NO         Reason:       PHYSICAL EXAM:  Neurology: alert and oriented x 3, nonfocal, no gross deficits  CV : S1S2  Sternal Wound :  CDI , Stable  Lungs: cta  Abdomen: soft, nontender, nondistended, positive bowel sounds, last bowel movement         Extremities:     no c/c/e    acetaminophen     Tablet .. 650 milliGRAM(s) Oral every 6 hours PRN  aspirin enteric coated 81 milliGRAM(s) Oral daily  atorvastatin 80 milliGRAM(s) Oral at bedtime  chlorhexidine 2% Cloths 1 Application(s) Topical daily  dextrose 50% Injectable 12.5 Gram(s) IV Push once  dextrose 50% Injectable 25 Gram(s) IV Push once  dextrose 50% Injectable 25 Gram(s) IV Push once  dextrose Oral Gel 15 Gram(s) Oral once  furosemide    Tablet 40 milliGRAM(s) Oral daily  gabapentin 100 milliGRAM(s) Oral every 8 hours  glucagon  Injectable 1 milliGRAM(s) IntraMuscular once  hydrALAZINE 25 milliGRAM(s) Oral every 8 hours  insulin glargine Injectable (LANTUS) 20 Unit(s) SubCutaneous at bedtime  insulin lispro (ADMELOG) corrective regimen sliding scale   SubCutaneous Before meals and at bedtime  insulin lispro Injectable (ADMELOG) 8 Unit(s) SubCutaneous three times a day before meals  metoprolol succinate ER 25 milliGRAM(s) Oral daily  oxyCODONE    IR 5 milliGRAM(s) Oral every 4 hours PRN  oxyCODONE    IR 10 milliGRAM(s) Oral every 4 hours PRN  pantoprazole    Tablet 40 milliGRAM(s) Oral before breakfast  polyethylene glycol 3350 17 Gram(s) Oral daily  senna 2 Tablet(s) Oral at bedtime  spironolactone 25 milliGRAM(s) Oral daily  tamsulosin 0.4 milliGRAM(s) Oral at bedtime    Physical Therapy Rec:   Home  [x  ]   Home w/ PT  [  ]  Rehab  [  ]  Discussed with Cardiothoracic Team at AM rounds. VITAL SIGNS-Telemetry:  SR 60-90  Vital Signs Last 24 Hrs  T(C): 37.2 (22 @ 04:29), Max: 37.5 (22 @ 20:41)  T(F): 99 (22 @ 04:29), Max: 99.5 (22 @ 20:41)  HR: 64 (22 @ 04:29) (64 - 89)  BP: 108/71 (22 @ 04:29) (108/71 - 128/80)  RR: 18 (22 @ 04:29) (18 - 18)  SpO2: 98% (22 @ 04:29) (98% - 100%)          @ 07:01  -   @ 07:00  --------------------------------------------------------  IN: 240 mL / OUT: 1300 mL / NET: -1060 mL    Daily     Daily Weight in k.5 (2022 04:29)    CAPILLARY BLOOD GLUCOSE  POCT Blood Glucose.: 188 mg/dL (2022 11:52)  POCT Blood Glucose.: 126 mg/dL (2022 08:02)  POCT Blood Glucose.: 138 mg/dL (2022 21:42)  POCT Blood Glucose.: 93 mg/dL (2022 16:29)        PHYSICAL EXAM:  Neurology: alert and oriented x 3, nonfocal, no gross deficits  CV : S1S2  Sternal Wound :  CDI , Stable  Lungs: cta  Abdomen: soft, nontender, nondistended, positive bowel sounds, last bowel movement         Extremities:     no c/c/e    acetaminophen     Tablet .. 650 milliGRAM(s) Oral every 6 hours PRN  aspirin enteric coated 81 milliGRAM(s) Oral daily  atorvastatin 80 milliGRAM(s) Oral at bedtime  chlorhexidine 2% Cloths 1 Application(s) Topical daily  dextrose 50% Injectable 12.5 Gram(s) IV Push once  dextrose 50% Injectable 25 Gram(s) IV Push once  dextrose 50% Injectable 25 Gram(s) IV Push once  dextrose Oral Gel 15 Gram(s) Oral once  furosemide    Tablet 40 milliGRAM(s) Oral daily  gabapentin 100 milliGRAM(s) Oral every 8 hours  glucagon  Injectable 1 milliGRAM(s) IntraMuscular once  hydrALAZINE 25 milliGRAM(s) Oral every 8 hours  insulin glargine Injectable (LANTUS) 20 Unit(s) SubCutaneous at bedtime  insulin lispro (ADMELOG) corrective regimen sliding scale   SubCutaneous Before meals and at bedtime  insulin lispro Injectable (ADMELOG) 8 Unit(s) SubCutaneous three times a day before meals  metoprolol succinate ER 25 milliGRAM(s) Oral daily  oxyCODONE    IR 5 milliGRAM(s) Oral every 4 hours PRN  oxyCODONE    IR 10 milliGRAM(s) Oral every 4 hours PRN  pantoprazole    Tablet 40 milliGRAM(s) Oral before breakfast  polyethylene glycol 3350 17 Gram(s) Oral daily  senna 2 Tablet(s) Oral at bedtime  spironolactone 25 milliGRAM(s) Oral daily  tamsulosin 0.4 milliGRAM(s) Oral at bedtime    Physical Therapy Rec:   Home  [x  ]   Home w/ PT  [  ]  Rehab  [  ]  Discussed with Cardiothoracic Team at AM rounds.

## 2022-06-28 NOTE — PROGRESS NOTE ADULT - PROBLEM SELECTOR PLAN 4
c/w diuresis- increase lasix 40 mg poqd & continue Aldactone 25mg  Strict I&Os  daily weight  trend bmp   EF 45%

## 2022-06-28 NOTE — PROGRESS NOTE ADULT - SUBJECTIVE AND OBJECTIVE BOX
Chief complaint  Patient is a 81y old  Male who presents with a chief complaint of CAD, left main disease (28 Jun 2022 12:09)   Review of systems  Patient in bed, looks comfortable, no hypoglycemic episodes.    Labs and Fingersticks  CAPILLARY BLOOD GLUCOSE      POCT Blood Glucose.: 188 mg/dL (28 Jun 2022 11:52)  POCT Blood Glucose.: 126 mg/dL (28 Jun 2022 08:02)  POCT Blood Glucose.: 138 mg/dL (27 Jun 2022 21:42)  POCT Blood Glucose.: 93 mg/dL (27 Jun 2022 16:29)      Anion Gap, Serum: 8 (06-28 @ 06:13)  Anion Gap, Serum: 7 (06-27 @ 06:15)      Calcium, Total Serum: 9.0 (06-28 @ 06:13)  Calcium, Total Serum: 8.9 (06-27 @ 06:15)          06-28    138  |  105  |  14  ----------------------------<  103<H>  4.7   |  25  |  0.84    Ca    9.0      28 Jun 2022 06:13                          8.4    11.98 )-----------( 166      ( 28 Jun 2022 06:11 )             26.3     Medications  MEDICATIONS  (STANDING):  apixaban 2.5 milliGRAM(s) Oral two times a day  aspirin enteric coated 81 milliGRAM(s) Oral daily  atorvastatin 80 milliGRAM(s) Oral at bedtime  chlorhexidine 2% Cloths 1 Application(s) Topical daily  dextrose 50% Injectable 25 Gram(s) IV Push once  dextrose 50% Injectable 12.5 Gram(s) IV Push once  dextrose 50% Injectable 25 Gram(s) IV Push once  dextrose Oral Gel 15 Gram(s) Oral once  furosemide    Tablet 40 milliGRAM(s) Oral daily  gabapentin 100 milliGRAM(s) Oral every 8 hours  glucagon  Injectable 1 milliGRAM(s) IntraMuscular once  hydrALAZINE 25 milliGRAM(s) Oral every 8 hours  insulin glargine Injectable (LANTUS) 20 Unit(s) SubCutaneous at bedtime  insulin lispro (ADMELOG) corrective regimen sliding scale   SubCutaneous Before meals and at bedtime  insulin lispro Injectable (ADMELOG) 8 Unit(s) SubCutaneous three times a day before meals  metoprolol succinate ER 25 milliGRAM(s) Oral daily  pantoprazole    Tablet 40 milliGRAM(s) Oral before breakfast  polyethylene glycol 3350 17 Gram(s) Oral daily  senna 2 Tablet(s) Oral at bedtime  spironolactone 25 milliGRAM(s) Oral daily  tamsulosin 0.4 milliGRAM(s) Oral at bedtime      Physical Exam  General: Patient comfortable in bed  Vital Signs Last 12 Hrs  T(F): 99 (06-28-22 @ 04:29), Max: 99 (06-28-22 @ 04:29)  HR: 64 (06-28-22 @ 04:29) (64 - 64)  BP: 108/71 (06-28-22 @ 04:29) (108/71 - 108/71)  BP(mean): 83 (06-28-22 @ 04:29) (83 - 83)  RR: 18 (06-28-22 @ 04:29) (18 - 18)  SpO2: 98% (06-28-22 @ 04:29) (98% - 98%)  Neck: No palpable thyroid nodules.  CVS: S1S2, No murmurs  Respiratory: No wheezing, no crepitations  GI: Abdomen soft, bowel sounds positive  Musculoskeletal:  edema lower extremities.   Skin: No skin rashes, no ecchymosis    Diagnostics             Chief complaint  Patient is a 81y old  Male who presents with a chief complaint of CAD, left main disease (28 Jun 2022 12:09)   Review of systems  Patient in bed, looks comfortable, no hypoglycemic episodes.    Labs and Fingersticks  CAPILLARY BLOOD GLUCOSE      POCT Blood Glucose.: 188 mg/dL (28 Jun 2022 11:52)  POCT Blood Glucose.: 126 mg/dL (28 Jun 2022 08:02)  POCT Blood Glucose.: 138 mg/dL (27 Jun 2022 21:42)  POCT Blood Glucose.: 93 mg/dL (27 Jun 2022 16:29)      Anion Gap, Serum: 8 (06-28 @ 06:13)  Anion Gap, Serum: 7 (06-27 @ 06:15)      Calcium, Total Serum: 9.0 (06-28 @ 06:13)  Calcium, Total Serum: 8.9 (06-27 @ 06:15)          06-28    138  |  105  |  14  ----------------------------<  103<H>  4.7   |  25  |  0.84    Ca    9.0      28 Jun 2022 06:13                          8.4    11.98 )-----------( 166      ( 28 Jun 2022 06:11 )             26.3     Medications  MEDICATIONS  (STANDING):  apixaban 2.5 milliGRAM(s) Oral two times a day  aspirin enteric coated 81 milliGRAM(s) Oral daily  atorvastatin 80 milliGRAM(s) Oral at bedtime  chlorhexidine 2% Cloths 1 Application(s) Topical daily  dextrose 50% Injectable 25 Gram(s) IV Push once  dextrose 50% Injectable 12.5 Gram(s) IV Push once  dextrose 50% Injectable 25 Gram(s) IV Push once  dextrose Oral Gel 15 Gram(s) Oral once  furosemide    Tablet 40 milliGRAM(s) Oral daily  gabapentin 100 milliGRAM(s) Oral every 8 hours  glucagon  Injectable 1 milliGRAM(s) IntraMuscular once  hydrALAZINE 25 milliGRAM(s) Oral every 8 hours  insulin glargine Injectable (LANTUS) 20 Unit(s) SubCutaneous at bedtime  insulin lispro (ADMELOG) corrective regimen sliding scale   SubCutaneous Before meals and at bedtime  insulin lispro Injectable (ADMELOG) 8 Unit(s) SubCutaneous three times a day before meals  metoprolol succinate ER 25 milliGRAM(s) Oral daily  pantoprazole    Tablet 40 milliGRAM(s) Oral before breakfast  polyethylene glycol 3350 17 Gram(s) Oral daily  senna 2 Tablet(s) Oral at bedtime  spironolactone 25 milliGRAM(s) Oral daily  tamsulosin 0.4 milliGRAM(s) Oral at bedtime      Physical Exam  General: Patient comfortable in bed  Vital Signs Last 12 Hrs  T(F): 99 (06-28-22 @ 04:29), Max: 99 (06-28-22 @ 04:29)  HR: 64 (06-28-22 @ 04:29) (64 - 64)  BP: 108/71 (06-28-22 @ 04:29) (108/71 - 108/71)  BP(mean): 83 (06-28-22 @ 04:29) (83 - 83)  RR: 18 (06-28-22 @ 04:29) (18 - 18)  SpO2: 98% (06-28-22 @ 04:29) (98% - 98%)  Neck: No palpable thyroid nodules.  CVS: S1S2, No murmurs  Respiratory: No wheezing, no crepitations  GI: Abdomen soft, bowel sounds positive  Musculoskeletal:  edema lower extremities.   Skin: No skin rashes, no ecchymosis    Diagnostics

## 2022-06-29 ENCOUNTER — TRANSCRIPTION ENCOUNTER (OUTPATIENT)
Age: 82
End: 2022-06-29

## 2022-06-29 VITALS
OXYGEN SATURATION: 99 % | DIASTOLIC BLOOD PRESSURE: 77 MMHG | HEART RATE: 84 BPM | SYSTOLIC BLOOD PRESSURE: 114 MMHG | RESPIRATION RATE: 18 BRPM | TEMPERATURE: 98 F

## 2022-06-29 PROBLEM — D50.9 IRON DEFICIENCY ANEMIA, UNSPECIFIED: Chronic | Status: ACTIVE | Noted: 2022-06-17

## 2022-06-29 PROBLEM — E78.5 HYPERLIPIDEMIA, UNSPECIFIED: Chronic | Status: ACTIVE | Noted: 2022-06-17

## 2022-06-29 PROBLEM — I48.0 PAROXYSMAL ATRIAL FIBRILLATION: Chronic | Status: ACTIVE | Noted: 2022-06-17

## 2022-06-29 PROBLEM — I25.10 ATHEROSCLEROTIC HEART DISEASE OF NATIVE CORONARY ARTERY WITHOUT ANGINA PECTORIS: Chronic | Status: ACTIVE | Noted: 2022-06-17

## 2022-06-29 PROBLEM — I50.9 HEART FAILURE, UNSPECIFIED: Chronic | Status: ACTIVE | Noted: 2022-06-17

## 2022-06-29 PROBLEM — I10 ESSENTIAL (PRIMARY) HYPERTENSION: Chronic | Status: ACTIVE | Noted: 2022-06-17

## 2022-06-29 PROBLEM — E11.9 TYPE 2 DIABETES MELLITUS WITHOUT COMPLICATIONS: Chronic | Status: ACTIVE | Noted: 2022-06-17

## 2022-06-29 LAB
ALBUMIN SERPL ELPH-MCNC: 3 G/DL — LOW (ref 3.3–5)
ALP SERPL-CCNC: 77 U/L — SIGNIFICANT CHANGE UP (ref 40–120)
ALT FLD-CCNC: 28 U/L — SIGNIFICANT CHANGE UP (ref 10–45)
ANION GAP SERPL CALC-SCNC: 10 MMOL/L — SIGNIFICANT CHANGE UP (ref 5–17)
AST SERPL-CCNC: 20 U/L — SIGNIFICANT CHANGE UP (ref 10–40)
BASOPHILS # BLD AUTO: 0.01 K/UL — SIGNIFICANT CHANGE UP (ref 0–0.2)
BASOPHILS NFR BLD AUTO: 0.1 % — SIGNIFICANT CHANGE UP (ref 0–2)
BILIRUB SERPL-MCNC: 0.5 MG/DL — SIGNIFICANT CHANGE UP (ref 0.2–1.2)
BUN SERPL-MCNC: 14 MG/DL — SIGNIFICANT CHANGE UP (ref 7–23)
CALCIUM SERPL-MCNC: 8.5 MG/DL — SIGNIFICANT CHANGE UP (ref 8.4–10.5)
CHLORIDE SERPL-SCNC: 103 MMOL/L — SIGNIFICANT CHANGE UP (ref 96–108)
CO2 SERPL-SCNC: 25 MMOL/L — SIGNIFICANT CHANGE UP (ref 22–31)
CREAT SERPL-MCNC: 0.83 MG/DL — SIGNIFICANT CHANGE UP (ref 0.5–1.3)
EGFR: 89 ML/MIN/1.73M2 — SIGNIFICANT CHANGE UP
EOSINOPHIL # BLD AUTO: 0.25 K/UL — SIGNIFICANT CHANGE UP (ref 0–0.5)
EOSINOPHIL NFR BLD AUTO: 2.2 % — SIGNIFICANT CHANGE UP (ref 0–6)
GLUCOSE BLDC GLUCOMTR-MCNC: 100 MG/DL — HIGH (ref 70–99)
GLUCOSE BLDC GLUCOMTR-MCNC: 123 MG/DL — HIGH (ref 70–99)
GLUCOSE SERPL-MCNC: 83 MG/DL — SIGNIFICANT CHANGE UP (ref 70–99)
HCT VFR BLD CALC: 25.6 % — LOW (ref 39–50)
HGB BLD-MCNC: 8.3 G/DL — LOW (ref 13–17)
IMM GRANULOCYTES NFR BLD AUTO: 1.8 % — HIGH (ref 0–1.5)
LYMPHOCYTES # BLD AUTO: 2.39 K/UL — SIGNIFICANT CHANGE UP (ref 1–3.3)
LYMPHOCYTES # BLD AUTO: 20.7 % — SIGNIFICANT CHANGE UP (ref 13–44)
MCHC RBC-ENTMCNC: 26.7 PG — LOW (ref 27–34)
MCHC RBC-ENTMCNC: 32.4 GM/DL — SIGNIFICANT CHANGE UP (ref 32–36)
MCV RBC AUTO: 82.3 FL — SIGNIFICANT CHANGE UP (ref 80–100)
MONOCYTES # BLD AUTO: 1.36 K/UL — HIGH (ref 0–0.9)
MONOCYTES NFR BLD AUTO: 11.8 % — SIGNIFICANT CHANGE UP (ref 2–14)
NEUTROPHILS # BLD AUTO: 7.3 K/UL — SIGNIFICANT CHANGE UP (ref 1.8–7.4)
NEUTROPHILS NFR BLD AUTO: 63.4 % — SIGNIFICANT CHANGE UP (ref 43–77)
NRBC # BLD: 0 /100 WBCS — SIGNIFICANT CHANGE UP (ref 0–0)
PLATELET # BLD AUTO: 208 K/UL — SIGNIFICANT CHANGE UP (ref 150–400)
POTASSIUM SERPL-MCNC: 4 MMOL/L — SIGNIFICANT CHANGE UP (ref 3.5–5.3)
POTASSIUM SERPL-SCNC: 4 MMOL/L — SIGNIFICANT CHANGE UP (ref 3.5–5.3)
PROT SERPL-MCNC: 5.7 G/DL — LOW (ref 6–8.3)
RBC # BLD: 3.11 M/UL — LOW (ref 4.2–5.8)
RBC # FLD: 15.7 % — HIGH (ref 10.3–14.5)
SODIUM SERPL-SCNC: 138 MMOL/L — SIGNIFICANT CHANGE UP (ref 135–145)
WBC # BLD: 11.52 K/UL — HIGH (ref 3.8–10.5)
WBC # FLD AUTO: 11.52 K/UL — HIGH (ref 3.8–10.5)

## 2022-06-29 PROCEDURE — 86901 BLOOD TYPING SEROLOGIC RH(D): CPT

## 2022-06-29 PROCEDURE — C1889: CPT

## 2022-06-29 PROCEDURE — 36415 COLL VENOUS BLD VENIPUNCTURE: CPT

## 2022-06-29 PROCEDURE — U0003: CPT

## 2022-06-29 PROCEDURE — C1751: CPT

## 2022-06-29 PROCEDURE — 84134 ASSAY OF PREALBUMIN: CPT

## 2022-06-29 PROCEDURE — 83036 HEMOGLOBIN GLYCOSYLATED A1C: CPT

## 2022-06-29 PROCEDURE — 85018 HEMOGLOBIN: CPT

## 2022-06-29 PROCEDURE — 97116 GAIT TRAINING THERAPY: CPT

## 2022-06-29 PROCEDURE — 97530 THERAPEUTIC ACTIVITIES: CPT

## 2022-06-29 PROCEDURE — 82962 GLUCOSE BLOOD TEST: CPT

## 2022-06-29 PROCEDURE — C1729: CPT

## 2022-06-29 PROCEDURE — 82435 ASSAY OF BLOOD CHLORIDE: CPT

## 2022-06-29 PROCEDURE — 94002 VENT MGMT INPAT INIT DAY: CPT

## 2022-06-29 PROCEDURE — 84295 ASSAY OF SERUM SODIUM: CPT

## 2022-06-29 PROCEDURE — 85576 BLOOD PLATELET AGGREGATION: CPT

## 2022-06-29 PROCEDURE — 85027 COMPLETE CBC AUTOMATED: CPT

## 2022-06-29 PROCEDURE — 82803 BLOOD GASES ANY COMBINATION: CPT

## 2022-06-29 PROCEDURE — 84484 ASSAY OF TROPONIN QUANT: CPT

## 2022-06-29 PROCEDURE — 82550 ASSAY OF CK (CPK): CPT

## 2022-06-29 PROCEDURE — 83735 ASSAY OF MAGNESIUM: CPT

## 2022-06-29 PROCEDURE — 81001 URINALYSIS AUTO W/SCOPE: CPT

## 2022-06-29 PROCEDURE — 84100 ASSAY OF PHOSPHORUS: CPT

## 2022-06-29 PROCEDURE — 83880 ASSAY OF NATRIURETIC PEPTIDE: CPT

## 2022-06-29 PROCEDURE — 86900 BLOOD TYPING SEROLOGIC ABO: CPT

## 2022-06-29 PROCEDURE — 71045 X-RAY EXAM CHEST 1 VIEW: CPT

## 2022-06-29 PROCEDURE — C1769: CPT

## 2022-06-29 PROCEDURE — 82330 ASSAY OF CALCIUM: CPT

## 2022-06-29 PROCEDURE — 85610 PROTHROMBIN TIME: CPT

## 2022-06-29 PROCEDURE — 87640 STAPH A DNA AMP PROBE: CPT

## 2022-06-29 PROCEDURE — 93005 ELECTROCARDIOGRAM TRACING: CPT

## 2022-06-29 PROCEDURE — 85025 COMPLETE CBC W/AUTO DIFF WBC: CPT

## 2022-06-29 PROCEDURE — 82553 CREATINE MB FRACTION: CPT

## 2022-06-29 PROCEDURE — 87641 MR-STAPH DNA AMP PROBE: CPT

## 2022-06-29 PROCEDURE — 80048 BASIC METABOLIC PNL TOTAL CA: CPT

## 2022-06-29 PROCEDURE — 82565 ASSAY OF CREATININE: CPT

## 2022-06-29 PROCEDURE — P9045: CPT

## 2022-06-29 PROCEDURE — 85730 THROMBOPLASTIN TIME PARTIAL: CPT

## 2022-06-29 PROCEDURE — 84132 ASSAY OF SERUM POTASSIUM: CPT

## 2022-06-29 PROCEDURE — P9047: CPT

## 2022-06-29 PROCEDURE — 83605 ASSAY OF LACTIC ACID: CPT

## 2022-06-29 PROCEDURE — U0005: CPT

## 2022-06-29 PROCEDURE — 94010 BREATHING CAPACITY TEST: CPT

## 2022-06-29 PROCEDURE — 82947 ASSAY GLUCOSE BLOOD QUANT: CPT

## 2022-06-29 PROCEDURE — 97161 PT EVAL LOW COMPLEX 20 MIN: CPT

## 2022-06-29 PROCEDURE — 80053 COMPREHEN METABOLIC PANEL: CPT

## 2022-06-29 PROCEDURE — 85014 HEMATOCRIT: CPT

## 2022-06-29 PROCEDURE — 86891 AUTOLOGOUS BLOOD OP SALVAGE: CPT

## 2022-06-29 PROCEDURE — C8929: CPT

## 2022-06-29 PROCEDURE — 84443 ASSAY THYROID STIM HORMONE: CPT

## 2022-06-29 PROCEDURE — 84439 ASSAY OF FREE THYROXINE: CPT

## 2022-06-29 PROCEDURE — 86850 RBC ANTIBODY SCREEN: CPT

## 2022-06-29 PROCEDURE — 86923 COMPATIBILITY TEST ELECTRIC: CPT

## 2022-06-29 PROCEDURE — 85384 FIBRINOGEN ACTIVITY: CPT

## 2022-06-29 RX ORDER — SPIRONOLACTONE 25 MG/1
1 TABLET, FILM COATED ORAL
Qty: 5 | Refills: 0
Start: 2022-06-29 | End: 2022-07-03

## 2022-06-29 RX ORDER — METOPROLOL TARTRATE 50 MG
1 TABLET ORAL
Qty: 30 | Refills: 0
Start: 2022-06-29 | End: 2022-07-28

## 2022-06-29 RX ORDER — ASPIRIN/CALCIUM CARB/MAGNESIUM 324 MG
1 TABLET ORAL
Qty: 30 | Refills: 0
Start: 2022-06-29 | End: 2022-07-28

## 2022-06-29 RX ORDER — SITAGLIPTIN AND METFORMIN HYDROCHLORIDE 500; 50 MG/1; MG/1
1 TABLET, FILM COATED ORAL
Qty: 60 | Refills: 0
Start: 2022-06-29 | End: 2022-07-28

## 2022-06-29 RX ORDER — FUROSEMIDE 40 MG
1 TABLET ORAL
Qty: 5 | Refills: 0
Start: 2022-06-29 | End: 2022-07-03

## 2022-06-29 RX ORDER — PANTOPRAZOLE SODIUM 20 MG/1
1 TABLET, DELAYED RELEASE ORAL
Qty: 15 | Refills: 0
Start: 2022-06-29 | End: 2022-07-13

## 2022-06-29 RX ORDER — OXYCODONE HYDROCHLORIDE 5 MG/1
1 TABLET ORAL
Qty: 12 | Refills: 0
Start: 2022-06-29 | End: 2022-07-01

## 2022-06-29 RX ORDER — GLIMEPIRIDE 1 MG
1 TABLET ORAL
Qty: 30 | Refills: 0
Start: 2022-06-29 | End: 2022-07-28

## 2022-06-29 RX ORDER — APIXABAN 2.5 MG/1
1 TABLET, FILM COATED ORAL
Qty: 60 | Refills: 0
Start: 2022-06-29 | End: 2022-07-28

## 2022-06-29 RX ORDER — APIXABAN 2.5 MG/1
5 TABLET, FILM COATED ORAL EVERY 12 HOURS
Refills: 0 | Status: DISCONTINUED | OUTPATIENT
Start: 2022-06-29 | End: 2022-06-29

## 2022-06-29 RX ORDER — AMIODARONE HYDROCHLORIDE 400 MG/1
1 TABLET ORAL
Qty: 30 | Refills: 0
Start: 2022-06-29 | End: 2022-07-28

## 2022-06-29 RX ORDER — ATORVASTATIN CALCIUM 80 MG/1
1 TABLET, FILM COATED ORAL
Qty: 30 | Refills: 0
Start: 2022-06-29 | End: 2022-07-28

## 2022-06-29 RX ORDER — SENNA PLUS 8.6 MG/1
2 TABLET ORAL
Qty: 10 | Refills: 0
Start: 2022-06-29 | End: 2022-07-03

## 2022-06-29 RX ADMIN — Medication 25 MILLIGRAM(S): at 06:07

## 2022-06-29 RX ADMIN — APIXABAN 2.5 MILLIGRAM(S): 2.5 TABLET, FILM COATED ORAL at 06:08

## 2022-06-29 RX ADMIN — Medication 40 MILLIGRAM(S): at 06:07

## 2022-06-29 RX ADMIN — Medication 81 MILLIGRAM(S): at 12:19

## 2022-06-29 RX ADMIN — Medication 8 UNIT(S): at 11:37

## 2022-06-29 RX ADMIN — AMIODARONE HYDROCHLORIDE 200 MILLIGRAM(S): 400 TABLET ORAL at 06:07

## 2022-06-29 RX ADMIN — CHLORHEXIDINE GLUCONATE 1 APPLICATION(S): 213 SOLUTION TOPICAL at 11:38

## 2022-06-29 RX ADMIN — Medication 8 UNIT(S): at 07:52

## 2022-06-29 RX ADMIN — SPIRONOLACTONE 25 MILLIGRAM(S): 25 TABLET, FILM COATED ORAL at 06:07

## 2022-06-29 RX ADMIN — PANTOPRAZOLE SODIUM 40 MILLIGRAM(S): 20 TABLET, DELAYED RELEASE ORAL at 06:07

## 2022-06-29 NOTE — PROGRESS NOTE ADULT - SUBJECTIVE AND OBJECTIVE BOX
Chief complaint  Patient is a 81y old  Male who presents with a chief complaint of CAD, left main disease (29 Jun 2022 10:19)   Review of systems  Patient awake in chair, looks comfortable, no hypoglycemic episodes. Planning DC.    Labs and Fingersticks  CAPILLARY BLOOD GLUCOSE      POCT Blood Glucose.: 123 mg/dL (29 Jun 2022 11:17)  POCT Blood Glucose.: 100 mg/dL (29 Jun 2022 07:40)  POCT Blood Glucose.: 97 mg/dL (28 Jun 2022 21:43)  POCT Blood Glucose.: 166 mg/dL (28 Jun 2022 16:34)      Anion Gap, Serum: 10 (06-29 @ 06:16)  Anion Gap, Serum: 8 (06-28 @ 06:13)      Calcium, Total Serum: 8.5 (06-29 @ 06:16)  Calcium, Total Serum: 9.0 (06-28 @ 06:13)  Albumin, Serum: 3.0 *L* (06-29 @ 06:16)    Alanine Aminotransferase (ALT/SGPT): 28 (06-29 @ 06:16)  Alkaline Phosphatase, Serum: 77 (06-29 @ 06:16)  Aspartate Aminotransferase (AST/SGOT): 20 (06-29 @ 06:16)        06-29    138  |  103  |  14  ----------------------------<  83  4.0   |  25  |  0.83    Ca    8.5      29 Jun 2022 06:16    TPro  5.7<L>  /  Alb  3.0<L>  /  TBili  0.5  /  DBili  x   /  AST  20  /  ALT  28  /  AlkPhos  77  06-29                        8.3    11.52 )-----------( 208      ( 29 Jun 2022 06:16 )             25.6     Medications  MEDICATIONS  (STANDING):  aMIOdarone    Tablet 200 milliGRAM(s) Oral daily  apixaban 5 milliGRAM(s) Oral every 12 hours  aspirin enteric coated 81 milliGRAM(s) Oral daily  atorvastatin 80 milliGRAM(s) Oral at bedtime  chlorhexidine 2% Cloths 1 Application(s) Topical daily  dextrose 50% Injectable 25 Gram(s) IV Push once  dextrose 50% Injectable 12.5 Gram(s) IV Push once  dextrose 50% Injectable 25 Gram(s) IV Push once  dextrose Oral Gel 15 Gram(s) Oral once  furosemide    Tablet 40 milliGRAM(s) Oral daily  glucagon  Injectable 1 milliGRAM(s) IntraMuscular once  insulin glargine Injectable (LANTUS) 20 Unit(s) SubCutaneous at bedtime  insulin lispro (ADMELOG) corrective regimen sliding scale   SubCutaneous Before meals and at bedtime  insulin lispro Injectable (ADMELOG) 8 Unit(s) SubCutaneous three times a day before meals  metoprolol succinate ER 25 milliGRAM(s) Oral daily  pantoprazole    Tablet 40 milliGRAM(s) Oral before breakfast  polyethylene glycol 3350 17 Gram(s) Oral daily  senna 2 Tablet(s) Oral at bedtime  spironolactone 25 milliGRAM(s) Oral daily  tamsulosin 0.4 milliGRAM(s) Oral at bedtime      Physical Exam  General: Patient comfortable in bed  Vital Signs Last 12 Hrs  T(F): 98.1 (06-29-22 @ 11:39), Max: 98.2 (06-29-22 @ 05:05)  HR: 84 (06-29-22 @ 11:39) (83 - 84)  BP: 114/77 (06-29-22 @ 11:39) (114/70 - 114/77)  BP(mean): --  RR: 18 (06-29-22 @ 11:39) (18 - 18)  SpO2: 99% (06-29-22 @ 11:39) (97% - 99%)  Neck: No palpable thyroid nodules.  CVS: S1S2, No murmurs  Respiratory: No wheezing, no crepitations  GI: Abdomen soft, bowel sounds positive  Musculoskeletal:  edema lower extremities.   Skin: No skin rashes, no ecchymosis    Diagnostics               Chief complaint  Patient is a 81y old  Male who presents with a chief complaint of CAD, left main disease (29 Jun 2022 10:19)   Review of systems  Patient awake in chair, looks comfortable,  no hypoglycemic episodes. Planning DC.    Labs and Fingersticks  CAPILLARY BLOOD GLUCOSE      POCT Blood Glucose.: 123 mg/dL (29 Jun 2022 11:17)  POCT Blood Glucose.: 100 mg/dL (29 Jun 2022 07:40)  POCT Blood Glucose.: 97 mg/dL (28 Jun 2022 21:43)  POCT Blood Glucose.: 166 mg/dL (28 Jun 2022 16:34)      Anion Gap, Serum: 10 (06-29 @ 06:16)  Anion Gap, Serum: 8 (06-28 @ 06:13)      Calcium, Total Serum: 8.5 (06-29 @ 06:16)  Calcium, Total Serum: 9.0 (06-28 @ 06:13)  Albumin, Serum: 3.0 *L* (06-29 @ 06:16)    Alanine Aminotransferase (ALT/SGPT): 28 (06-29 @ 06:16)  Alkaline Phosphatase, Serum: 77 (06-29 @ 06:16)  Aspartate Aminotransferase (AST/SGOT): 20 (06-29 @ 06:16)        06-29    138  |  103  |  14  ----------------------------<  83  4.0   |  25  |  0.83    Ca    8.5      29 Jun 2022 06:16    TPro  5.7<L>  /  Alb  3.0<L>  /  TBili  0.5  /  DBili  x   /  AST  20  /  ALT  28  /  AlkPhos  77  06-29                        8.3    11.52 )-----------( 208      ( 29 Jun 2022 06:16 )             25.6     Medications  MEDICATIONS  (STANDING):  aMIOdarone    Tablet 200 milliGRAM(s) Oral daily  apixaban 5 milliGRAM(s) Oral every 12 hours  aspirin enteric coated 81 milliGRAM(s) Oral daily  atorvastatin 80 milliGRAM(s) Oral at bedtime  chlorhexidine 2% Cloths 1 Application(s) Topical daily  dextrose 50% Injectable 25 Gram(s) IV Push once  dextrose 50% Injectable 12.5 Gram(s) IV Push once  dextrose 50% Injectable 25 Gram(s) IV Push once  dextrose Oral Gel 15 Gram(s) Oral once  furosemide    Tablet 40 milliGRAM(s) Oral daily  glucagon  Injectable 1 milliGRAM(s) IntraMuscular once  insulin glargine Injectable (LANTUS) 20 Unit(s) SubCutaneous at bedtime  insulin lispro (ADMELOG) corrective regimen sliding scale   SubCutaneous Before meals and at bedtime  insulin lispro Injectable (ADMELOG) 8 Unit(s) SubCutaneous three times a day before meals  metoprolol succinate ER 25 milliGRAM(s) Oral daily  pantoprazole    Tablet 40 milliGRAM(s) Oral before breakfast  polyethylene glycol 3350 17 Gram(s) Oral daily  senna 2 Tablet(s) Oral at bedtime  spironolactone 25 milliGRAM(s) Oral daily  tamsulosin 0.4 milliGRAM(s) Oral at bedtime      Physical Exam  General: Patient comfortable in bed  Vital Signs Last 12 Hrs  T(F): 98.1 (06-29-22 @ 11:39), Max: 98.2 (06-29-22 @ 05:05)  HR: 84 (06-29-22 @ 11:39) (83 - 84)  BP: 114/77 (06-29-22 @ 11:39) (114/70 - 114/77)  BP(mean): --  RR: 18 (06-29-22 @ 11:39) (18 - 18)  SpO2: 99% (06-29-22 @ 11:39) (97% - 99%)  Neck: No palpable thyroid nodules.  CVS: S1S2, No murmurs  Respiratory: No wheezing, no crepitations  GI: Abdomen soft, bowel sounds positive  Musculoskeletal:  edema lower extremities.   Skin: No skin rashes, no ecchymosis    Diagnostics

## 2022-06-29 NOTE — PROGRESS NOTE ADULT - PROBLEM SELECTOR PROBLEM 1
S/P CABG x 2
CAD (coronary artery disease)
DM (diabetes mellitus)
CAD (coronary artery disease)
DM (diabetes mellitus)
S/P CABG x 2
S/P CABG x 2
CAD (coronary artery disease)
CAD (coronary artery disease)
DM (diabetes mellitus)

## 2022-06-29 NOTE — DISCHARGE NOTE PROVIDER - CARE PROVIDERS DIRECT ADDRESSES
[email or web address]

## 2022-06-29 NOTE — PROGRESS NOTE ADULT - PROBLEM SELECTOR PROBLEM 4
HTN (hypertension)
HF (heart failure)
HF (heart failure)
HTN (hypertension)
HF (heart failure)

## 2022-06-29 NOTE — PROGRESS NOTE ADULT - SUBJECTIVE AND OBJECTIVE BOX
DATE OF SERVICE: 06-29-22 @ 09:27    Patient is a 81y old  Male who presents with a chief complaint of CAD, left main disease (28 Jun 2022 12:54)      INTERVAL HISTORY: Feels ok. New onset A-flutter yesterday which is now paroxysmal yet rate controlled.     REVIEW OF SYSTEMS:  CONSTITUTIONAL: No weakness  EYES/ENT: No visual changes;  No throat pain   NECK: No pain or stiffness  RESPIRATORY: No cough, wheezing; No shortness of breath  CARDIOVASCULAR: No chest pain or palpitations  GASTROINTESTINAL: No abdominal  pain. No nausea, vomiting, or hematemesis  GENITOURINARY: No dysuria, frequency or hematuria  NEUROLOGICAL: No stroke like symptoms  SKIN: No rashes    TELEMETRY Personally reviewed: SR1st AVB / A-flutter 50-80  	  MEDICATIONS:  aMIOdarone    Tablet 200 milliGRAM(s) Oral daily  furosemide    Tablet 40 milliGRAM(s) Oral daily  metoprolol succinate ER 25 milliGRAM(s) Oral daily  spironolactone 25 milliGRAM(s) Oral daily  tamsulosin 0.4 milliGRAM(s) Oral at bedtime        PHYSICAL EXAM:  T(C): 36.8 (06-29-22 @ 05:05), Max: 36.9 (06-28-22 @ 19:33)  HR: 83 (06-29-22 @ 05:05) (64 - 83)  BP: 114/70 (06-29-22 @ 05:05) (114/70 - 118/66)  RR: 18 (06-29-22 @ 05:05) (18 - 18)  SpO2: 97% (06-29-22 @ 05:05) (97% - 100%)  Wt(kg): --  I&O's Summary    28 Jun 2022 07:01  -  29 Jun 2022 07:00  --------------------------------------------------------  IN: 480 mL / OUT: 1400 mL / NET: -920 mL    29 Jun 2022 07:01  -  29 Jun 2022 09:27  --------------------------------------------------------  IN: 360 mL / OUT: 675 mL / NET: -315 mL          Appearance: In no distress	  HEENT:    PERRL, EOMI	  Cardiovascular:  S1 S2, No JVD  Respiratory: Lungs clear to auscultation	  Gastrointestinal:  Soft, Non-tender, + BS	  Vascularature:  No edema of LE  Psychiatric: Appropriate affect   Neuro: no acute focal deficits                               8.3    11.52 )-----------( 208      ( 29 Jun 2022 06:16 )             25.6     06-29    138  |  103  |  14  ----------------------------<  83  4.0   |  25  |  0.83    Ca    8.5      29 Jun 2022 06:16    TPro  5.7<L>  /  Alb  3.0<L>  /  TBili  0.5  /  DBili  x   /  AST  20  /  ALT  28  /  AlkPhos  77  06-29        Labs personally reviewed      ASSESSMENT/PLAN: 	  82 yo male with cad s/p stent x2 on plavix, ischemic cardiomyopathy w/ mild LV dysfunction, HLD, CHF, KIMMY, paroxysmal afib (not on ac due to bleed in the past?), HTN, DM2 presents to the ER with acute R sided chest pressure starting around 11AM today. Admitted for further evaluation due to elevated cardiac enzymes.    Pt s/p cardiac catherization with evidence of CAD with left main disease. PT transferred to [facility] for surgical evaluation with Dr. Perez. Labs and imaging to be reviewed with Dr. Perez.     Problem/Recommendation - 1:  ·  Problem; CAD   - EKG noted   - s/p LHC showing CAD of left main   - TTE shows EF 45%  and Severe mitral regurgitation with an eccentric, anteriorly directed jet.  Mild to moderate segmental left ventricular systolic dysfunction.  Hypokinesis of the lateral, inferolateral, and basal inferior walls  - Post op CABG and MV annuloplasty  - c/w ASA 81, Atorvastatin 80, hydrALAZINE 25 mgTID, metoprolol succinate ER 25 mg QD, and spironolactone 25 mg QD      Problem/Recommendation - 2:  ·  Problem: Systolic HF (heart failure).   - TTE as noted above   - appears euvolemic   - Monitor BP   - on Metoprolol 25mg QD, Spironolactone 25mg QD       Problem/Recommendation - 3:  ·  Problem; HTN   - BP stable off gtts   - on spironolactone, lasix and hydral       Problem/Recommendation - 4:  ·  Problem: DM   - ISS       Problem/Recommendation - 5:  ·  Problem: HLD   - c/w statin       Problem/Recommendation - 6:  ·  Problem: New Onset Atrial Flutter  - Tele shows pA-Flutter 50-80s  - Pt asymptomatic  - c/w Metoprolol  - Apixaban 2.5mg PO BID initiated  - [id number] score 4      GERALD Ziegler DO [facility]  Cardiovascular Medicine  [address] [place] [address], [address]  Office: [phone]  Cell: [phone]

## 2022-06-29 NOTE — PROGRESS NOTE ADULT - ASSESSMENT
Assessment  DMT2: 81y Male with DM T2 with hyperglycemia admitted with chest pain, A1C 7.2%, now on basal bolus insulin, blood sugars trending within overall acceptable range, no hypoglycemic episodes, eating meals, appears alert and comfortable, planning DC.  CAD: S/P CABG, on medications, monitored, asymptomatic.  HTN: On antihypertensive medications, monitored, asymptomatic.  Overweight: No strict exercise routines, neither on low calorie diet.       Svitlana Nolan MD  Cell: [phone]  Office: [phone]       Assessment  DMT2: 81y Male with DM T2 with hyperglycemia admitted with chest pain, A1C 7.2%, now on basal bolus insulin, blood sugars trending within overall acceptable range, no hypoglycemic episodes, eating meals, appears alert and  comfortable, planning DC.  CAD: S/P CABG, on medications, monitored, asymptomatic.  HTN: On antihypertensive medications, monitored, asymptomatic.  Overweight: No strict exercise routines, neither on low calorie diet.       Svitlana Nolan MD  Cell: [phone]  Office: [phone]

## 2022-06-29 NOTE — DISCHARGE NOTE PROVIDER - NSDCMRMEDTOKEN_GEN_ALL_CORE_FT
Amaryl 2 mg oral tablet: 1 tab(s) orally once a day (in the morning)   amiodarone 200 mg oral tablet: 1 tab(s) orally once a day  apixaban 5 mg oral tablet: 1 tab(s) orally every 12 hours  aspirin 81 mg oral delayed release tablet: 1 tab(s) orally once a day  atorvastatin 80 mg oral tablet: 1 tab(s) orally once a day (at bedtime)  ferrous sulfate 325 mg (65 mg elemental iron) oral delayed release tablet: 1 tab(s) orally once a day  furosemide 40 mg oral tablet: 1 tab(s) orally once a day  Janumet 50 mg-1000 mg oral tablet: 1 tab(s) orally 2 times a day   metoprolol succinate 25 mg oral tablet, extended release: 1 tab(s) orally once a day  oxyCODONE 5 mg oral tablet: 1 tab(s) orally every 6 hours, As Needed -Moderate Pain (4 - 6) MDD:4  pantoprazole 40 mg oral delayed release tablet: 1 tab(s) orally once a day (before a meal)  senna leaf extract oral tablet: 2 tab(s) orally once a day (at bedtime)  as needed for constipation   spironolactone 25 mg oral tablet: 1 tab(s) orally once a day  tamsulosin 0.4 mg oral capsule: 1 cap(s) orally once a day (at bedtime)

## 2022-06-29 NOTE — PROGRESS NOTE ADULT - NS ATTEND AMEND GEN_ALL_CORE FT
Pt care and plan discussed and reviewed with NP. Plan as outlined above edited by me to reflect our discussion.
Patient seen and examined today at bedside. Chart, labs, vitals, radiology reviewed. Above H&P reviewed and edited where appropriate. Agree with history and physical exam. Agree with assessment and plan. I reviewed the overnight course of events and discussed the care with the patient and their family  35 minutes spent on total encounter of which more than fifty percent of the encounter was spent counseling and/or coordinating care by the attending physician.
Patient seen and examined today at bedside. Chart, labs, vitals, radiology reviewed. Above H&P reviewed and edited where appropriate. Agree with history and physical exam. Agree with assessment and plan. I reviewed the overnight course of events and discussed the care with the patient and their family  35 minutes spent on total encounter of which more than fifty percent of the encounter was spent counseling and/or coordinating care by the attending physician.
Patient seen and examined today at bedside. Chart, labs, vitals, radiology reviewed. Above H&P reviewed and edited where appropriate. Agree with history and physical exam. Agree with assessment and plan. I reviewed the overnight course of events and discussed the care with the patient and their family  35 minutes spent on total encounter of which more than fifty percent of the encounter was spent counseling and/or coordinating care by the attending physician.  '

## 2022-06-29 NOTE — DISCHARGE NOTE PROVIDER - NSDCFUADDAPPT_GEN_ALL_CORE_FT
Follow up with Dr. Perez on July 8th at 1:30PM at [facility] for your post op follow up appointment, if you are unable to keep this appointment please call the [facility] office to re-schedule at [phone].     Follow up with Dr. Nolan (Endocrinologist) in 2 weeks for your postop check. Please call to schedule appointment.

## 2022-06-29 NOTE — DISCHARGE NOTE NURSING/CASE MANAGEMENT/SOCIAL WORK - NSDCPEFALRISK_GEN_ALL_CORE
For information on Fall & Injury Prevention, visit: [email or web address] OR  [email or web address] OR  https://www.cdc.gov/steadi/patient.html

## 2022-06-29 NOTE — DISCHARGE NOTE PROVIDER - PROVIDER TOKENS
PROVIDER:[TOKEN:[[id number]],SCHEDULEDAPPT:[07/08/2022],SCHEDULEDAPPTTIME:[01:30 AM]],PROVIDER:[TOKEN:[[id number]],FOLLOWUP:[2 weeks],SCHEDULEDAPPTTIME:[01:30 PM]]

## 2022-06-29 NOTE — PROGRESS NOTE ADULT - PROBLEM SELECTOR PROBLEM 3
HF (heart failure)
DM (diabetes mellitus)
DM (diabetes mellitus)
HF (heart failure)
DM (diabetes mellitus)
HF (heart failure)
HF (heart failure)

## 2022-06-29 NOTE — DISCHARGE NOTE NURSING/CASE MANAGEMENT/SOCIAL WORK - PATIENT PORTAL LINK FT
You can access the FollowMyHealth Patient Portal offered by [facility] by registering at the following website: [email or web address]. By joining [employer]’s FollowMyHealth portal, you will also be able to view your health information using other applications (apps) compatible with our system.

## 2022-06-29 NOTE — DISCHARGE NOTE PROVIDER - CARE PROVIDER_API CALL
Nolan Perez)  Thoracic and Cardiac Surgery  [address] [place] [address]  Phone: [phone]  Fax: [phone]  Scheduled Appointment: 07/08/2022 01:30 AM    Svitlana Nolan)  EndocrinologyMetabDiabetes; Internal Medicine  [address] [place] [address]  Phone: [phone]  Fax: [phone]  Follow Up Time: 2 weeks

## 2022-06-29 NOTE — PROGRESS NOTE ADULT - PROBLEM/PLAN-4
"Anesthesia Transfer of Care Note    Patient: Tea Georges    Procedure(s) Performed: Procedure(s) (LRB):  ANGIOGRAM-CEREBRAL (N/A)    Patient location: ICU    Anesthesia Type: general    Transport from OR: Transported from OR on 2-3 L/min O2 by NC with adequate spontaneous ventilation    Post pain: adequate analgesia    Post assessment: no apparent anesthetic complications    Post vital signs: stable    Level of consciousness: awake    Nausea/Vomiting: no nausea/vomiting    Complications: none    Transfer of care protocol was followed      Last vitals:   Visit Vitals  BP (!) 152/86   Pulse (!) 56   Temp 36.7 °C (98 °F) (Axillary)   Resp 20   Ht 5' 3" (1.6 m)   Wt 51.3 kg (113 lb 1.5 oz)   LMP  (LMP Unknown)   SpO2 95%   Breastfeeding? No   BMI 20.03 kg/m²     "
DISPLAY PLAN FREE TEXT

## 2022-06-29 NOTE — PROGRESS NOTE ADULT - REASON FOR ADMISSION
CAD, left main disease

## 2022-06-29 NOTE — DISCHARGE NOTE PROVIDER - NSDCPNSUBOBJ_GEN_ALL_CORE
Patient seen at bedside, doing well. Comfortable on room air.   Plan for discharge home as per Dr. Perez.     ICU Vital Signs Last 24 Hrs  T(C): 36.8 (29 Jun 2022 05:05), Max: 36.9 (28 Jun 2022 19:33)  T(F): 98.2 (29 Jun 2022 05:05), Max: 98.4 (28 Jun 2022 19:33)  HR: 83 (29 Jun 2022 05:05) (64 - 83)  BP: 114/70 (29 Jun 2022 05:05) (114/70 - 118/66)  BP(mean): --  ABP: --  ABP(mean): --  RR: 18 (29 Jun 2022 05:05) (18 - 18)  SpO2: 97% (29 Jun 2022 05:05) (97% - 100%)    General: WN/WD NAD  Neurology: A&Ox3, nonfocal, SHELBY x 4  Head:  Normocephalic, atraumatic  ENT:  Mucosa moist, no ulcerations  Neck:  Supple, no sinuses or palpable masses  Lymphatic:  No palpable cervical, supraclavicular, axillary or inguinal adenopathy  Respiratory: CTA B/L  CV: IIR, S1S2, no murmur  Abdominal: Soft, NT, ND no palpable mass  MSK: No edema, + peripheral pulses, FROM all 4 extremity  Incisions: intact, no erythema or drainage

## 2022-06-29 NOTE — PROGRESS NOTE ADULT - PROBLEM SELECTOR PROBLEM 2
CAD (coronary artery disease)
DM (diabetes mellitus)
CAD (coronary artery disease)
DM (diabetes mellitus)
S/P mitral valve repair
S/P mitral valve repair
DM (diabetes mellitus)
CAD (coronary artery disease)
S/P mitral valve repair

## 2022-06-29 NOTE — DISCHARGE NOTE PROVIDER - NSDCCPCAREPLAN_GEN_ALL_CORE_FT
PRINCIPAL DISCHARGE DIAGNOSIS  Diagnosis: S/P CABG x 2  Assessment and Plan of Treatment: s/p [id number] and Mitral Valve Repair   Shower daily  Weigh yourself daily  Continue current prescriptions as ordered  Increase activity as tolerated   no added salt; low fat; low cholesterol, low salt diet.   follow up with Cardiologist in 1-2 weeks. Call to schedule appointment.  follow up with cardiac surgeon Dr. Perez July 8th @ 1:30 pm

## 2022-07-01 ENCOUNTER — NON-APPOINTMENT (OUTPATIENT)
Age: 82
End: 2022-07-01

## 2022-07-01 ENCOUNTER — APPOINTMENT (OUTPATIENT)
Dept: CARE COORDINATION | Facility: HOME HEALTH | Age: 82
End: 2022-07-01

## 2022-07-01 VITALS
DIASTOLIC BLOOD PRESSURE: 68 MMHG | RESPIRATION RATE: 19 BRPM | HEART RATE: 76 BPM | OXYGEN SATURATION: 98 % | SYSTOLIC BLOOD PRESSURE: 132 MMHG

## 2022-07-01 PROCEDURE — 99024 POSTOP FOLLOW-UP VISIT: CPT

## 2022-07-01 RX ORDER — SITAGLIPTIN AND METFORMIN HYDROCHLORIDE 50; 1000 MG/1; MG/1
50-1000 TABLET, FILM COATED, EXTENDED RELEASE ORAL
Refills: 0 | Status: ACTIVE | COMMUNITY

## 2022-07-01 RX ORDER — ATORVASTATIN CALCIUM 80 MG/1
80 TABLET, FILM COATED ORAL DAILY
Refills: 0 | Status: ACTIVE | COMMUNITY

## 2022-07-01 RX ORDER — APIXABAN 5 MG/1
5 TABLET, FILM COATED ORAL
Refills: 0 | Status: ACTIVE | COMMUNITY

## 2022-07-01 RX ORDER — TAMSULOSIN HCL 0.4 MG
0.4 CAPSULE ORAL
Refills: 0 | Status: ACTIVE | COMMUNITY

## 2022-07-01 RX ORDER — ASPIRIN 81 MG/1
81 TABLET ORAL DAILY
Refills: 0 | Status: ACTIVE | COMMUNITY

## 2022-07-01 RX ORDER — SPIRONOLACTONE 25 MG/1
25 TABLET ORAL DAILY
Refills: 0 | Status: ACTIVE | COMMUNITY

## 2022-07-01 NOTE — HISTORY OF PRESENT ILLNESS
[FreeTextEntry1] : 80 yo male with cad s/p stent x2 on plavix, ischemic cardiomyopathy w/ mild LV \par dysfunction, HLD, CHF, KIMMY, paroxysmal afib (not on ac due to bleed in the \par past?), HTN, DM2 presents to the ER with acute R sided chest pressure starting \par around 11AM today. Admitted for further evaluation due to elevated cardiac \par enzymes. \par Pt s/p cardiac catherization with evidence of CAD with left main disease. PT \par transferred to [facility] for surgical evaluation with Dr. Perez. Labs and \par imaging to be reviewed with Dr. Perez. \par \par 6/17: transferred to [address] [place] [address], [facility]; acc junct/ rsr 50-60; preop w/u in progress, \par f/u P2Y12 123 today;  ck echo: mod MR; mild LV systolic dysfunction \par continue asa/ statin and bb; add dvt prophylaxis \par 6/19  vss  p2y12  133 \par 6/20 VSS: pt stable at this time; repeat p2y12 in am; OR planned for thur 6/23 \par with Dr. Perez \par 6/21 VSS; RSR 50-60; repeat p2y12 this am 152; OR planned for thur 6/24 with \par Dr. Perez \par 6/22 VSS P2Y12 138 cardiac surgery workup completed OR in am \par 6/23 s/p C2L and MV repair \par 6/26 Primacor gtt dced at 4am, Insulin gtt dced this AM, transferred to floor, \par due to void at 845pm, Eliquis 2.5 BID for afib as per Dr. Perez, start BB \par tomorrow due to prolonged inotrope \par 6/27 VSS; RSR 70-80; initiate bb today- toprol 25 qd as per Dr. Perez; \par increase diuretics; continue eliquis for anticoagulation; hold eliquis for pw \par removal 6/28 \par endo following for glycemic control \par 6/28 VSS eliquis on hold - pws removed.  Eliquis to be resumed tonight.  d/c \par plan home tomorrow \par 6/29 VSS Eliquis increased to 5mgBID as per Dr. Perez. Plan to discharge home \par today. \par \par 7/1 Initial visit completed at home\par \par CC " I am doing ok"

## 2022-07-01 NOTE — PHYSICAL EXAM
[] : no respiratory distress [Exaggerated Use Of Accessory Muscles For Inspiration] : no accessory muscle use [Auscultation Breath Sounds / Voice Sounds] : lungs were clear to auscultation bilaterally [Heart Sounds] : normal S1 and S2 [Chest Visual Inspection Thoracic Asymmetry] : no chest asymmetry [1+] : left 1+ [Bowel Sounds] : normal bowel sounds [Oriented To Time, Place, And Person] : oriented to person, place, and time [FreeTextEntry2] : no LE edema noted  [FreeTextEntry1] : LLE SVG site melanie and well approximated. No erythema or drainage  noted

## 2022-07-01 NOTE — REASON FOR VISIT
[Follow-Up: _____] : a [unfilled] follow-up visit [Spouse] : spouse [FreeTextEntry1] : Transitional Care Management

## 2022-07-06 PROBLEM — Z09 POSTOPERATIVE FOLLOW-UP: Status: ACTIVE | Noted: 2022-07-06

## 2022-07-06 RX ORDER — METOPROLOL TARTRATE 25 MG/1
25 TABLET, FILM COATED ORAL DAILY
Refills: 0 | Status: COMPLETED | COMMUNITY
End: 2022-07-06

## 2022-07-08 ENCOUNTER — NON-APPOINTMENT (OUTPATIENT)
Age: 82
End: 2022-07-08

## 2022-07-08 ENCOUNTER — APPOINTMENT (OUTPATIENT)
Dept: CARDIOTHORACIC SURGERY | Facility: CLINIC | Age: 82
End: 2022-07-08
Payer: MEDICARE

## 2022-07-08 VITALS
OXYGEN SATURATION: 99 % | SYSTOLIC BLOOD PRESSURE: 118 MMHG | TEMPERATURE: 98.7 F | HEART RATE: 95 BPM | BODY MASS INDEX: 24.48 KG/M2 | WEIGHT: 156 LBS | RESPIRATION RATE: 16 BRPM | DIASTOLIC BLOOD PRESSURE: 81 MMHG | HEIGHT: 67 IN

## 2022-07-08 DIAGNOSIS — Z09 ENCOUNTER FOR FOLLOW-UP EXAMINATION AFTER COMPLETED TREATMENT FOR CONDITIONS OTHER THAN MALIGNANT NEOPLASM: ICD-10-CM

## 2022-07-08 PROCEDURE — 99024 POSTOP FOLLOW-UP VISIT: CPT

## 2022-07-08 PROCEDURE — 93000 ELECTROCARDIOGRAM COMPLETE: CPT

## 2022-07-08 RX ORDER — METOPROLOL SUCCINATE 50 MG/1
50 TABLET, EXTENDED RELEASE ORAL
Qty: 30 | Refills: 2 | Status: ACTIVE | COMMUNITY
Start: 1900-01-01 | End: 1900-01-01

## 2022-07-08 RX ORDER — AMIODARONE HYDROCHLORIDE 200 MG/1
200 TABLET ORAL DAILY
Refills: 0 | Status: COMPLETED | COMMUNITY
End: 2022-07-08

## 2022-07-14 LAB — GLUCOSE BLDC GLUCOMTR-MCNC: 113 MG/DL — HIGH (ref 70–99)

## 2022-07-28 ENCOUNTER — TRANSCRIPTION ENCOUNTER (OUTPATIENT)
Age: 82
End: 2022-07-28

## 2022-08-04 NOTE — PROGRESS NOTE ADULT - SUBJECTIVE AND OBJECTIVE BOX
Outbound call to patient. Patient handed phone to wife. MWV schedules, Lab appointment scheduled and lab orders entered.    Date of Service   06-27-22 @ 15:18    Patient is a 81y old  Male who presents with a chief complaint of CAD, left main disease (27 Jun 2022 13:21)      INTERVAL HISTORY: pt feels ok   TELEMETRY Personally reviewed: SR 70-80'S     REVIEW OF SYSTEMS:   CONSTITUTIONAL: No weakness  EYES/ENT: No visual changes; No throat pain  Neck: No pain or stiffness  Respiratory: No cough, wheezing, No shortness of breath  CARDIOVASCULAR: no chest pain or palpitations  GASTROINTESTINAL: No abdominal pain, no nausea, vomiting or hematemesis  GENITOURINARY: No dysuria, frequency or hematuria  NEUROLOGICAL: No stroke like symptoms  SKIN: No rashes    	  MEDICATIONS:  hydrALAZINE 25 milliGRAM(s) Oral every 8 hours  metoprolol succinate ER 25 milliGRAM(s) Oral daily  spironolactone 25 milliGRAM(s) Oral daily  tamsulosin 0.4 milliGRAM(s) Oral at bedtime        PHYSICAL EXAM:  T(C): 36.8 (06-27-22 @ 12:36), Max: 37.3 (06-27-22 @ 04:55)  HR: 88 (06-27-22 @ 12:36) (86 - 88)  BP: 128/80 (06-27-22 @ 12:36) (121/82 - 128/80)  RR: 18 (06-27-22 @ 12:36) (18 - 18)  SpO2: 100% (06-27-22 @ 12:36) (99% - 100%)  Wt(kg): --  I&O's Summary    26 Jun 2022 07:01  -  27 Jun 2022 07:00  --------------------------------------------------------  IN: 720 mL / OUT: 1280 mL / NET: -560 mL    27 Jun 2022 07:01  -  27 Jun 2022 15:18  --------------------------------------------------------  IN: 0 mL / OUT: 700 mL / NET: -700 mL          Appearance: In no distress	  HEENT:    PERRL, EOMI	  Cardiovascular:  S1 S2, No JVD  Respiratory: Lungs clear to auscultation	  Gastrointestinal:  Soft, Non-tender, + BS	  Vasculature:  No edema of LE  Psychiatric: Appropriate affect   Neuro: no acute focal deficits                               8.8    12.30 )-----------( 142      ( 27 Jun 2022 06:15 )             26.9     06-27    138  |  105  |  15  ----------------------------<  103<H>  4.0   |  26  |  0.75    Ca    8.9      27 Jun 2022 06:15  Phos  2.3     06-26  Mg     2.3     06-26    TPro  6.2  /  Alb  3.6  /  TBili  0.5  /  DBili  x   /  AST  38  /  ALT  33  /  AlkPhos  91  06-26        Labs personally reviewed      ASSESSMENT/PLAN: 	  80 yo male with cad s/p stent x2 on plavix, ischemic cardiomyopathy w/ mild LV dysfunction, HLD, CHF, KIMMY, paroxysmal afib (not on ac due to bleed in the past?), HTN, DM2 presents to the ER with acute R sided chest pressure starting around 11AM today. Admitted for further evaluation due to elevated cardiac enzymes.    Pt s/p cardiac catherization with evidence of CAD with left main disease. PT transferred to [address] for surgical evaluation with Dr. Perez. Labs and imaging to be reviewed with Dr. Perez.     Problem/Recommendation - 1:  ·  Problem; CAD   - EKG noted   - s/p LHC showing CAD of left main   - TTE shows EF 45%  and Severe mitral regurgitation with an eccentric, anteriorly directed jet.  Mild to moderate segmental left ventricular systolic dysfunction.  Hypokinesis of the lateral, inferolateral, and basal inferior walls  - Post op CABG and MV annuloplasty  - c/w ASA 81, Atorvastatin 80, hydrALAZINE 25 mgTID, metoprolol succinate ER 25 mg QD, and spironolactone 25 mg QD      Problem/Recommendation - 2:  ·  Problem: Systolic HF (heart failure).   - TTE as noted above   - appears euvolemic   - Monitor BP   - on Metoprolol 25mg QD, Spironolactone 25mg QD       Problem/Recommendation - 3:  ·  Problem; HTN   - BP stable off gtts   - on spironolactone, lasix and hydral       Problem/Recommendation - 4:  ·  Problem: DM   - ISS       Problem/Recommendation - 5:  ·  Problem: HLD   - c/w statin         Imani PRINGLE-BC   Prasanna Scott DO [facility]  Cardiovascular Medicine  [address] [place] [address], [address]  Office: [phone]

## 2022-08-08 ENCOUNTER — NON-APPOINTMENT (OUTPATIENT)
Age: 82
End: 2022-08-08

## 2022-09-16 NOTE — PATIENT PROFILE ADULT. - NS PRO ABUSE SCREEN SUSPICION NEGLECT YN
PRE-OP EVALUATION    Patient Name: Michelle Connell    Admit Diagnosis: preg state  Pregnancy    Pre-op Diagnosis: * No surgery found *        Anesthesia Procedure: LABOR ANALGESIA    * Surgery not found *    Pre-op vitals reviewed. There is no height or weight on file to calculate BMI. Current medications reviewed. Hospital Medications:  acetaminophen (Tylenol Extra Strength) tab 500 mg, 500 mg, Oral, Q6H PRN  ibuprofen (Motrin) tab 600 mg, 600 mg, Oral, Q6H PRN  oxyTOCIN in sodium chloride 0.9% (Pitocin) 30 Units/500mL infusion premix, 300 mL/hr, Intravenous, Continuous  terbutaline (Brethine) 1 MG/ML injection 0.25 mg, 0.25 mg, Subcutaneous, PRN  sodium citrate-citric acid (Bicitra) 500-334 MG/5ML oral solution 30 mL, 30 mL, Oral, PRN  ondansetron (Zofran) 4 MG/2ML injection 4 mg, 4 mg, Intravenous, Q6H PRN  ammonia aromatic (ammonia) nasal inhalation 0.3 mL, 0.3 mL, Nasal, PRN  lactated ringers infusion, , Intravenous, Continuous  dextrose in lactated ringers 5% infusion, , Intravenous, PRN  lactated ringers IV bolus 1,000 mL, 1,000 mL, Intravenous, Once  fentaNYL-ropivacaine 2 mcg/mL-0.15% EPIDURAL infusion premix, 10 mL/hr, Epidural, Continuous  fentaNYL (Sublimaze) 50 mcg/mL injection 100 mcg, 100 mcg, Epidural, Once  EPHEDrine (PF) 25 MG/5 ML injection 5 mg, 5 mg, Intravenous, PRN  nalbuphine (Nubain) 10 mg/mL injection 2.5 mg, 2.5 mg, Intravenous, Q15 Min PRN        Outpatient Medications:   Prenatal 27-0.8 MG Oral Tab, Take 1 tablet by mouth daily. , Disp: , Rfl: , 9/15/2022 at Unknown time  Cholecalciferol (VITAMIN D) 50 MCG (2000 UT) Oral Cap, Take by mouth., Disp: , Rfl: , 9/15/2022 at Unknown time  Ferrous Sulfate 324 (65 Fe) MG Oral Tab EC, Take by mouth., Disp: , Rfl: , 9/15/2022 at Unknown time        Allergies: Patient has no known allergies. Anesthesia Evaluation    Patient summary reviewed.     Anesthetic Complications  (-) history of anesthetic complications GI/Hepatic/Renal    Negative GI/hepatic/renal ROS. Cardiovascular    Negative cardiovascular ROS. Exercise tolerance: good     MET: >4                                           Endo/Other    Negative endo/other ROS. Pulmonary    Negative pulmonary ROS. Neuro/Psych    Negative neuro/psych ROS. Patient Active Problem List:     Pregnancy         History reviewed. No pertinent surgical history. Social History    Socioeconomic History      Marital status:     Tobacco Use      Smoking status: Never Smoker      Smokeless tobacco: Never Used    Substance and Sexual Activity      Alcohol use: No      Drug use: No      Drug use: No     Available pre-op labs reviewed. Airway      Mallampati: II  Mouth opening: >3 FB  TM distance: 4 - 6 cm  Neck ROM: full Cardiovascular    Cardiovascular exam normal.  Rhythm: regular  Rate: normal     Dental             Pulmonary    Pulmonary exam normal.                 Other findings            ASA: 2   Plan: epidural           Comment: Benefits of epidural analgesia were discussed with Finley Moritz. Realistic expectations were also discussed including necessity for additional dosing, or replacement of epidural, nausea/vomiting, pruritus, as well as other serious but rare complications including PPDH, infection, epidural hematoma, nerve/cord injury. Patient verbalizes understanding of expectations and risk. All questions were answered.      Plan/risks discussed with: patient and spouse                Present on Admission:  **None** no

## 2022-10-03 PROBLEM — Z95.1 S/P CABG X 2: Status: ACTIVE | Noted: 2022-07-06

## 2022-10-03 PROBLEM — Z09 SURGICAL FOLLOWUP: Status: ACTIVE | Noted: 2022-10-03

## 2022-10-03 PROBLEM — Z98.890 S/P MITRAL VALVE REPAIR: Status: ACTIVE | Noted: 2022-07-06

## 2022-10-03 PROBLEM — Z86.79 HISTORY OF CORONARY ARTERY DISEASE: Status: RESOLVED | Noted: 2022-10-03 | Resolved: 2022-10-03

## 2022-10-07 ENCOUNTER — APPOINTMENT (OUTPATIENT)
Dept: CARDIOTHORACIC SURGERY | Facility: CLINIC | Age: 82
End: 2022-10-07
Payer: MEDICARE

## 2022-10-07 VITALS
DIASTOLIC BLOOD PRESSURE: 78 MMHG | HEIGHT: 67 IN | OXYGEN SATURATION: 100 % | RESPIRATION RATE: 16 BRPM | TEMPERATURE: 98.4 F | HEART RATE: 77 BPM | SYSTOLIC BLOOD PRESSURE: 116 MMHG

## 2022-10-07 VITALS — BODY MASS INDEX: 23.65 KG/M2 | WEIGHT: 151 LBS

## 2022-10-07 DIAGNOSIS — Z86.79 PERSONAL HISTORY OF OTHER DISEASES OF THE CIRCULATORY SYSTEM: ICD-10-CM

## 2022-10-07 DIAGNOSIS — Z78.9 OTHER SPECIFIED HEALTH STATUS: ICD-10-CM

## 2022-10-07 DIAGNOSIS — Z09 ENCOUNTER FOR FOLLOW-UP EXAMINATION AFTER COMPLETED TREATMENT FOR CONDITIONS OTHER THAN MALIGNANT NEOPLASM: ICD-10-CM

## 2022-10-07 DIAGNOSIS — Z98.890 OTHER SPECIFIED POSTPROCEDURAL STATES: ICD-10-CM

## 2022-10-07 DIAGNOSIS — Z95.1 PRESENCE OF AORTOCORONARY BYPASS GRAFT: ICD-10-CM

## 2022-10-07 PROCEDURE — 99214 OFFICE O/P EST MOD 30 MIN: CPT

## 2022-10-07 RX ORDER — PANTOPRAZOLE SODIUM 40 MG/1
40 TABLET, DELAYED RELEASE ORAL DAILY
Refills: 0 | Status: COMPLETED | COMMUNITY
End: 2022-10-07

## 2022-10-07 RX ORDER — SENNOSIDES 8.6 MG TABLETS 8.6 MG/1
8.6 TABLET ORAL
Qty: 60 | Refills: 0 | Status: COMPLETED | COMMUNITY
End: 2022-10-07

## 2022-10-07 RX ORDER — FUROSEMIDE 40 MG/1
40 TABLET ORAL DAILY
Refills: 0 | Status: COMPLETED | COMMUNITY
End: 2022-10-07

## 2022-10-07 RX ORDER — OXYCODONE HYDROCHLORIDE 5 MG/1
5 CAPSULE ORAL EVERY 6 HOURS
Refills: 0 | Status: COMPLETED | COMMUNITY
End: 2022-10-07

## 2022-10-07 RX ORDER — GLIMEPIRIDE 2 MG/1
2 TABLET ORAL DAILY
Refills: 0 | Status: COMPLETED | COMMUNITY
End: 2022-10-07

## 2022-10-10 NOTE — CONSULT LETTER
[Dear  ___] : Dear  [unfilled], [FreeTextEntry2] : Dr. Nilesh Duran [FreeTextEntry1] : I had the pleasure of seeing your patient, BOOGIE SY, in my office today. \par \par We take a multidisciplinary team approach to patient care and consider you, the referring physician, an extension of our team. We will maintain an open line of communication with you throughout your patient's treatment course.  \par \par As you recall, he is a 81 year old male status post CABG X 2 ( LIMA to LAD, SVG to OM) , Mitral valve repair using 28 mm Cannon physio ring on 6/23/22 .The patient presents to the office today for a routine follow up visit with repeat diagnostic imaging . I have enclosed a copy for your records.\par \par Today on exam patient's lungs clear bilaterally, normal sinus rhythm, sternum stable, incision clean, dry and intact. No peripheral edema noted.  Therefore, I have recommended that the patient to follow up with Cardiologist and PCP. \par \par I appreciate the opportunity to care for your patient at [facility] . If there are any questions or concerns, please call me at [phone]. \par \par Please see my note below. \par \par Sincerely, \par \par \par \par \par Nolan Perez MD\par Director\par [facility]\par Professor \par Cardiovascular & Thoracic Surgery\par [facility] [employer].\par \par \par [facility]:\par Department of Cardiovascular and Thoracic Surgery\[address], [address]\par Office: [phone]\par Fax: [phone]\par \par Dee Maddox\[facility]  \[facility] Department of Cardiovascular and Thoracic Surgery\[address], [address]\par Phone: [phone]\par Office: [phone]\[facility] \par

## 2022-10-10 NOTE — DATA REVIEWED
[FreeTextEntry1] : 9/15/22  TTE revealed Mild mitral regurgitation,No MS. The mitral valve has be surgically repaired and an annuloplasty ring seen in place.Mild TR .Moderate pulmonary hypertension.

## 2022-10-10 NOTE — END OF VISIT
[FreeTextEntry3] : \par I personally scribed for HANDY VALDERRAMA on Oct  7 2022  2:00PM . \par \par \par \par \par Physician Attestation:\par Documented by SONY STEVENSON acting as a scribe for HANDY VALDERRAMA 10/07/2022 . \par                 All medical record entries made by the Scribe were at my, HANDY VALDERRAMA , direction and personally dictated by me on 10/07/2022 . I have reviewed the chart and agree that the record accurately reflects my personal performance of the history, physical exam, assessment and plan\par \par

## 2022-10-10 NOTE — HISTORY OF PRESENT ILLNESS
[FreeTextEntry1] : Mr. SY is a 81 year old male with  past medical history of cad s/p stent x2 on plavix, ischemic cardiomyopathy w/ mild LV dysfunction, HLD, CHF, KIMMY, paroxysmal afib (not on ac due to bleed in the past?), HTN, DM2 presents to the ER with acute R sided chest pressure starting around 11AM today. Admitted for further evaluation due to elevated cardiac enzymes. Pt s/p cardiac catherization with evidence of CAD with left main disease. PT transferred to [address] for surgical evaluation with Dr. Perez. \par \par He is S/P CABG X 2 ( LIMA to LAD, SVG to OM) , Mitral valve repair using 28 mm Cannon physio ring on 6/23/22.Post op course significant with Aib , started on Amio. On Eliquis 2.5 BID for afib and mitral valve. He is here for 3 months follow up visit with TTE . \par \par He presents and reports that he has Occ dizziness, otherwise doing well. Denies any chest pain, palpitations, dizziness or pedal edema. \par \par

## 2022-10-10 NOTE — ASSESSMENT
[FreeTextEntry1] : Mr. SY is a 81 year old male with  past medical history of cad s/p stent x2 on plavix, ischemic cardiomyopathy w/ mild LV dysfunction, HLD, CHF, KIMMY, paroxysmal afib (not on ac due to bleed in the past?), HTN, DM2 presents to the ER with acute R sided chest pressure starting around 11AM today. Admitted for further evaluation due to elevated cardiac enzymes. Pt s/p cardiac catherization with evidence of CAD with left main disease. PT transferred to [address] for surgical evaluation with Dr. Perez. \par \par He is S/P CABG X 2 ( LIMA to LAD, SVG to OM) , Mitral valve repair using 28 mm Cannon physio ring on 6/23/22.Post op course significant with Aib , started on Amio. On Eliquis 2.5 BID for afib and mitral valve. He is here for 3 months follow up visit with TTE . \par \par He presents and reports that he has Occ dizziness, otherwise doing well. Denies any chest pain, palpitations, dizziness or pedal edema. \par \par Today on exam patient's lungs clear bilaterally, normal sinus rhythm, sternum stable, incision clean, dry and intact. No peripheral edema noted. \par \par  reviewed the TTE and explained to the patient .9/15/22  TTE revealed Mild mitral regurgitation,No MS. The mitral valve has be surgically repaired and an annuloplasty ring seen in place.Mild TR .Moderate pulmonary hypertension. \par \par Plan:\par 1) Continue current medication regimen\par 2) Follow up with cardiologist ( ) and PCP \par 3) May return on as needed basis \par 4) Ambulate as tolerated \par 5) SBE antibiotic prophylaxis discussed at length \par 6) Continue to increase activity and walk daily as tolerated. Continue to use incentive spirometer. \par \par \par \par

## 2023-01-03 NOTE — H&P ADULT - ASSESSMENT
82 yo male with cad s/p stent on plavix, ischemic cardiomyopathy w/ mild LV dysfunction, paroxysmal afib (not on ac due to bleed in the past?), HTN, Dm2 presents to the ER with acute R sided chest pressure starting around 11AM today. Admitted for further evaluation.  2 = A lot of assistance

## 2023-02-28 NOTE — ED ADULT NURSE NOTE - PAIN RATING/NUMBER SCALE (0-10): ACTIVITY
0
BIBA from home for low HR in the 20s. hx of daniel-tachy syndrome. Pt is asymptomatic. hx of dementia.

## 2023-03-05 NOTE — ED ADULT TRIAGE NOTE - CCCP TRG CHIEF CMPLNT
Pt called and wanted to know if the medication is enough for both eyes. Dr Isa Gay stated yes it should be and I can update note for tomorrow. Note will be in chart waiting for pt to . difficulty breathing

## 2023-12-18 NOTE — ED ADULT NURSE NOTE - NS ED NURSE RECORD ANOTHER VITAL SIGN
The sheath was inserted into the right radial artery retrograde. Yes, record another set of vital signs

## 2024-07-01 NOTE — ED ADULT NURSE NOTE - EXTENSIONS OF SELF_ADULT
None Render In Strict Bullet Format?: No Initiate Treatment: Clobetasol 0.05% Topical cream apply to affected area on arms BID PRN Detail Level: Zone

## 2024-08-23 NOTE — ED ADULT TRIAGE NOTE - SOURCE OF INFORMATION
[facility]   Physical Therapy Treatment Note     Name: Romana Case  Clinic Number: [id number]    Therapy Diagnosis:   No diagnosis found.          Physician: Adrianne Flor PA-C    Visit Date: 8/23/2024    Physician: Adrianne Flor PA-C     Physician Orders: PT Eval and Treat S/P ORIF Tibial Plateau Fx  Medical Diagnosis from Referral: S/P Tibial Plateau Fx  Evaluation Date: 4/29/2024  Authorization Period Expiration: 8/30/24  Plan of Care Expiration: 8/1/24 (extended)  Progress Note Due: 8/25/24  Date of Surgery: 3/8/24  Visit # / Visits authorized: 22/ 33  FOTO: 1/ 3     Precautions:  Currently NWB (until 5/3/24), ROM as tolerated    ORIF right tibia plateau, tibial shaft, removal external fixator right lower extremity, right lateral meniscus repair DOS: 3-8-24  POW: 19 weeks 6 days     Time In: 11:00 am  Time Out: 11:55 am  Total Billable Time: 53   minutes    SUBJECTIVE     Pt reports: that she return to M.D. She was referral for another M.D specialist. Pt states she might have LYNSEY. She is waing for them to call her.   She was compliant with home exercise program.  Response to previous treatment: No change   Functional change: None    Pain: 0/10  Location: right knee  anterior knee, lower calf     OBJECTIVE     Objective Measures updated at progress report unless specified.         Updated 08/23/2024    R knee flexion PROM: 65 deg with seated EOB  R knee extension 0 deg     MMT   Right knee flex 4-/5  Right knee ext 4/5  Right hip abd 4/5  Right hip flex 5/5  Right ankle DF 4+/5  Right anklr PF 5/5  Right inver/ever 4/5    TREATMENT     Romana received the treatments listed below:      therapeutic activities to improve functional performance for 15 minutes, including:    R-bike seat level 16 for 10 min  Forward Step up and down 6 in box 3x10    Sit to stand 18 in box 2 blue foam 1x10     received therapeutic exercises to develop strength and ROM for 00 minutes  including:    Standing calf stretch 5x30 sec  Stairs knee flexion 30x hold 10 sec     neuromuscular re-education activities to improve: muscles motor control  for 40 minutes. The following activities were included:    Standing red TKE 3x10 hold 5 sec   Standing mini squat  3x10     Shuttle DL squat 2 black  band 4x10  Shuttle SL squat 1.5 black  band 4x10  Standing hip abd 2x10 ea   Standing marching 10 ea   Matrix knee extension 10# 3x10   Matrix knee flexion 35# 3x10   Matric hip abd 30# 3x10     received the following manual therapy techniques: Soft tissue Mobilization were applied to the: R knee for  00 minutes, including:  Patella mobs in all direction   PROM in flexion + STM quad tendon   EOT MET for knee flexion   Scar tissue massage       PATIENT EDUCATION AND HOME EXERCISES     Education provided:   - discussed WB restrictions and progression.  Importance to move the knee, start ROM activity.  Use of cryo for edema control.     Education on today's visit 5/9/2024:  Elevate and do ankle pumps to help with decrease swelling  Heavily focus on restoring knee flexion at home. ROM as tolerated.  Practice sitting with right knee in flexed position rather than in extension for too long.        Written Home Exercises Provided: yes. Exercises were reviewed and Romana was able to demonstrate them prior to the end of the session.  Romana demonstrated fair  understanding of the education provided. See EMR under Patient Instructions for exercises provided during therapy sessions.     ASSESSMENT   Pt has been s/p ORIF right tibia plateau, tibial shaft, removal external fixator right lower extremity, right lateral meniscus repair on 3-8-24. Pt has been 19 weeks and 6 days since surgery. Pt was re-assessed today. Pt was able to achieve 65 deg of PROM R knee flexion and 0 deg of extension. Pt demonstrated stiffness of R knee. She cont to use dynasplint flexion at home. Pt has any overall 4/5 MMT of R LE. Pt has min pain  of R knee. Pt has improved patella mobility in all direction. Pt might have scar tissues build up in the R knee preventing R knee flexion. Pt has met 0/4 LTGs today. Overall, pt ahs been improving slowly. R knee flexion stiffness is limiting progress. Pt might be having LYNSEY soon. Plan to place therapy on hold until pt has her LYNSEY. I believe pt required more physical therapy visit to improve functional mobility and quality of life.       Romana Is progressing well towards her goals.   Pt prognosis is Good.     Pt will continue to benefit from skilled outpatient physical therapy to address the deficits listed in the problem list box on initial evaluation, provide pt/family education and to maximize pt's level of independence in the home and community environment.     Pt's spiritual, cultural and educational needs considered and pt agreeable to plan of care and goals.     Anticipated barriers to physical therapy: None at this time     Goals:  Short Term Goals: 3 weeks   1: Pt I with progressed HEP. Goal met 5-30-24   2: Pt ambulate full wb with rolling walker in clinic 100 ft with good mechanics. Goal met 5-30-24  3: Pt increase AROM knee ext to 0 deg. Goal met 5-30-24  4: Pt Transfer sit to stand I. Goal met 5-30-24  5: Pt increase PROM knee flex to 110 deg. Goal not met 5-30-24     Long Term Goals: 8 weeks   1: Pt ambulate community distance with SC max pain of 2/10. Goal not met 7-25-24  2: Pt increase AROM knee flex to 120 deg for safety with gait. Goal not met 7-25-24  3: Pt Increase MMS right knee flex/ext to 4+/5. Goal not met 7-25-24  4: Pt report ability to drive herself. Goal not met 7-25-24    PLAN     Plan to place therapy on hold until pt return to M.D and have LYNSEY.     Cont POC    Peter Anaya, PT                                Patient

## 2024-12-10 NOTE — ED ADULT NURSE NOTE - TEMPERATURE IN CELSIUS (DEGREES C)
Detail Level: Detailed
General Sunscreen Counseling: I recommended a broad spectrum sunscreen with a SPF of 30 or higher.  I explained that SPF 30 sunscreens block approximately 97 percent of the sun's harmful rays.  Sunscreens should be applied at least 15 minutes prior to expected sun exposure and then every 2 hours after that as long as sun exposure continues. If swimming or exercising sunscreen should be reapplied every 45 minutes to an hour after getting wet or sweating.  One ounce, or the equivalent of a shot glass full of sunscreen, is adequate to protect the skin not covered by a bathing suit. I also recommended a lip balm with a sunscreen as well. Sun protective clothing can be used in lieu of sunscreen but must be worn the entire time you are exposed to the sun's rays.
Products Recommended: OTC Neutrogena sunscreen or Elta MD products.\\n\\nPlease schedule a follow-up appointment if you notice any new suspicious lesions lasting more than 3 months or if any existing moles change in color, shape, or size.
36.2

## 2024-12-23 NOTE — PROGRESS NOTE ADULT - PROBLEM/PLAN-2
This patient currently demonstrates symptoms of depression.  He does carry a prior diagnosis of bipolar disorder, though is a fairly limited historian and on current assessment, denies any periods of time that would be consistent with emily or hypomania with no periods of time with decreased need for sleep with excessively elevated mood or energy.  He does have a history of significant agitation and behavioral concerns.  His sister is unable to provide further clarification regarding his diagnostic history, though did note that he was stable on Seroquel and an uncertain other antidepressant recently before he discontinued these.    Seroquel has been restarted at 25 mg twice daily, will plan to titrate to 50 mg twice daily and continue to titrate further as indicated/tolerated.   DISPLAY PLAN FREE TEXT

## 2024-12-24 NOTE — PATIENT PROFILE ADULT. - NS PRO PT REFERRAL QUES 2 YN
[facility] [phone] for prior authorization on:    Medication: quilpta  Dosage: 60mg daily  Quantity requested:  90 with 3 refills  Pharmacy for prescription has been selected.    Prior authorization request has been initiated and sent for completion.    no

## 2025-02-21 NOTE — ED ADULT NURSE NOTE - IS THE PATIENT ABLE TO BE SCREENED?
Yes
Bed/Stretcher in lowest position, wheels locked, appropriate side rails in place/Call bell, personal items and telephone in reach/Instruct patient to call for assistance before getting out of bed/chair/stretcher/Non-slip footwear applied when patient is off stretcher/[place] to call system/Physically safe environment - no spills, clutter or unnecessary equipment/Purposeful proactive rounding/Room/bathroom lighting operational, light cord in reach

## 2025-08-08 ENCOUNTER — EMERGENCY (EMERGENCY)
Facility: HOSPITAL | Age: 85
LOS: 0 days | Discharge: HOME | End: 2025-08-08
Attending: STUDENT IN AN ORGANIZED HEALTH CARE EDUCATION/TRAINING PROGRAM
Payer: MEDICARE

## 2025-08-08 VITALS
SYSTOLIC BLOOD PRESSURE: 135 MMHG | OXYGEN SATURATION: 100 % | TEMPERATURE: 98 F | RESPIRATION RATE: 18 BRPM | HEIGHT: 67 IN | DIASTOLIC BLOOD PRESSURE: 97 MMHG | WEIGHT: 141.98 LBS | HEART RATE: 65 BPM

## 2025-08-08 VITALS
SYSTOLIC BLOOD PRESSURE: 144 MMHG | HEART RATE: 59 BPM | OXYGEN SATURATION: 100 % | RESPIRATION RATE: 17 BRPM | DIASTOLIC BLOOD PRESSURE: 81 MMHG

## 2025-08-08 DIAGNOSIS — M25.562 PAIN IN LEFT KNEE: ICD-10-CM
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
## 2025-08-08 DIAGNOSIS — M25.521 PAIN IN RIGHT ELBOW: ICD-10-CM

## 2025-08-08 DIAGNOSIS — S80.212A ABRASION, LEFT KNEE, INITIAL ENCOUNTER: ICD-10-CM

## 2025-08-08 DIAGNOSIS — Z95.5 PRESENCE OF CORONARY ANGIOPLASTY IMPLANT AND GRAFT: Chronic | ICD-10-CM

## 2025-08-08 DIAGNOSIS — E11.9 TYPE 2 DIABETES MELLITUS WITHOUT COMPLICATIONS: ICD-10-CM

## 2025-08-08 DIAGNOSIS — I11.0 HYPERTENSIVE HEART DISEASE WITH HEART FAILURE: ICD-10-CM

## 2025-08-08 DIAGNOSIS — S80.811A ABRASION, RIGHT LOWER LEG, INITIAL ENCOUNTER: ICD-10-CM

## 2025-08-08 DIAGNOSIS — S80.211A ABRASION, RIGHT KNEE, INITIAL ENCOUNTER: ICD-10-CM

## 2025-08-08 DIAGNOSIS — Z23 ENCOUNTER FOR IMMUNIZATION: ICD-10-CM

## 2025-08-08 DIAGNOSIS — I50.9 HEART FAILURE, UNSPECIFIED: ICD-10-CM
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
## 2025-08-08 DIAGNOSIS — Z98.890 OTHER SPECIFIED POSTPROCEDURAL STATES: Chronic | ICD-10-CM

## 2025-08-08 DIAGNOSIS — S50.311A ABRASION OF RIGHT ELBOW, INITIAL ENCOUNTER: ICD-10-CM

## 2025-08-08 DIAGNOSIS — Z79.82 LONG TERM (CURRENT) USE OF ASPIRIN: ICD-10-CM

## 2025-08-08 DIAGNOSIS — I25.10 ATHEROSCLEROTIC HEART DISEASE OF NATIVE CORONARY ARTERY WITHOUT ANGINA PECTORIS: ICD-10-CM

## 2025-08-08 DIAGNOSIS — M25.561 PAIN IN RIGHT KNEE: ICD-10-CM

## 2025-08-08 DIAGNOSIS — Z95.5 PRESENCE OF CORONARY ANGIOPLASTY IMPLANT AND GRAFT: ICD-10-CM

## 2025-08-08 DIAGNOSIS — I45.10 UNSPECIFIED RIGHT BUNDLE-BRANCH BLOCK: ICD-10-CM

## 2025-08-08 DIAGNOSIS — Z98.890 OTHER SPECIFIED POSTPROCEDURAL STATES: ICD-10-CM

## 2025-08-08 LAB
ALBUMIN SERPL ELPH-MCNC: 3.4 G/DL — SIGNIFICANT CHANGE UP (ref 3.3–5)
ALP SERPL-CCNC: 78 U/L — SIGNIFICANT CHANGE UP (ref 40–120)
ALT FLD-CCNC: 28 U/L — SIGNIFICANT CHANGE UP (ref 12–78)
ANION GAP SERPL CALC-SCNC: 5 MMOL/L — SIGNIFICANT CHANGE UP (ref 5–17)
AST SERPL-CCNC: 25 U/L — SIGNIFICANT CHANGE UP (ref 15–37)
BASOPHILS # BLD AUTO: 0.02 K/UL — SIGNIFICANT CHANGE UP (ref 0–0.2)
BASOPHILS NFR BLD AUTO: 0.2 % — SIGNIFICANT CHANGE UP (ref 0–2)
BILIRUB SERPL-MCNC: 0.6 MG/DL — SIGNIFICANT CHANGE UP (ref 0.2–1.2)
BUN SERPL-MCNC: 18 MG/DL — SIGNIFICANT CHANGE UP (ref 7–23)
CALCIUM SERPL-MCNC: 9.9 MG/DL — SIGNIFICANT CHANGE UP (ref 8.5–10.1)
CHLORIDE SERPL-SCNC: 108 MMOL/L — SIGNIFICANT CHANGE UP (ref 96–108)
CO2 SERPL-SCNC: 26 MMOL/L — SIGNIFICANT CHANGE UP (ref 22–31)
CREAT SERPL-MCNC: 1.07 MG/DL — SIGNIFICANT CHANGE UP (ref 0.5–1.3)
EGFR: 69 ML/MIN/1.73M2 — SIGNIFICANT CHANGE UP
EGFR: 69 ML/MIN/1.73M2 — SIGNIFICANT CHANGE UP
EOSINOPHIL # BLD AUTO: 0.2 K/UL — SIGNIFICANT CHANGE UP (ref 0–0.5)
EOSINOPHIL NFR BLD AUTO: 2 % — SIGNIFICANT CHANGE UP (ref 0–6)
GLUCOSE SERPL-MCNC: 129 MG/DL — HIGH (ref 70–99)
HCT VFR BLD CALC: 42.1 % — SIGNIFICANT CHANGE UP (ref 39–50)
HGB BLD-MCNC: 13.7 G/DL — SIGNIFICANT CHANGE UP (ref 13–17)
IMM GRANULOCYTES NFR BLD AUTO: 0.2 % — SIGNIFICANT CHANGE UP (ref 0–0.9)
LYMPHOCYTES # BLD AUTO: 2.5 K/UL — SIGNIFICANT CHANGE UP (ref 1–3.3)
LYMPHOCYTES # BLD AUTO: 25 % — SIGNIFICANT CHANGE UP (ref 13–44)
MCHC RBC-ENTMCNC: 27.7 PG — SIGNIFICANT CHANGE UP (ref 27–34)
MCHC RBC-ENTMCNC: 32.5 G/DL — SIGNIFICANT CHANGE UP (ref 32–36)
MCV RBC AUTO: 85.1 FL — SIGNIFICANT CHANGE UP (ref 80–100)
MONOCYTES # BLD AUTO: 0.66 K/UL — SIGNIFICANT CHANGE UP (ref 0–0.9)
MONOCYTES NFR BLD AUTO: 6.6 % — SIGNIFICANT CHANGE UP (ref 2–14)
NEUTROPHILS # BLD AUTO: 6.6 K/UL — SIGNIFICANT CHANGE UP (ref 1.8–7.4)
NEUTROPHILS NFR BLD AUTO: 66 % — SIGNIFICANT CHANGE UP (ref 43–77)
NRBC BLD AUTO-RTO: 0 /100 WBCS — SIGNIFICANT CHANGE UP (ref 0–0)
PLATELET # BLD AUTO: 231 K/UL — SIGNIFICANT CHANGE UP (ref 150–400)
POTASSIUM SERPL-MCNC: 4.8 MMOL/L — SIGNIFICANT CHANGE UP (ref 3.5–5.3)
POTASSIUM SERPL-SCNC: 4.8 MMOL/L — SIGNIFICANT CHANGE UP (ref 3.5–5.3)
PROT SERPL-MCNC: 8.1 GM/DL — SIGNIFICANT CHANGE UP (ref 6–8.3)
RBC # BLD: 4.95 M/UL — SIGNIFICANT CHANGE UP (ref 4.2–5.8)
RBC # FLD: 16.2 % — HIGH (ref 10.3–14.5)
SODIUM SERPL-SCNC: 139 MMOL/L — SIGNIFICANT CHANGE UP (ref 135–145)
WBC # BLD: 10 K/UL — SIGNIFICANT CHANGE UP (ref 3.8–10.5)
WBC # FLD AUTO: 10 K/UL — SIGNIFICANT CHANGE UP (ref 3.8–10.5)

## 2025-08-08 PROCEDURE — 99284 EMERGENCY DEPT VISIT MOD MDM: CPT

## 2025-08-08 PROCEDURE — 73080 X-RAY EXAM OF ELBOW: CPT | Mod: 26,RT

## 2025-08-08 PROCEDURE — 73590 X-RAY EXAM OF LOWER LEG: CPT | Mod: 26,50

## 2025-08-08 PROCEDURE — 73562 X-RAY EXAM OF KNEE 3: CPT | Mod: 26,50

## 2025-08-08 PROCEDURE — 93010 ELECTROCARDIOGRAM REPORT: CPT

## (undated) DEVICE — PACK UNIVERSAL CARDIAC

## (undated) DEVICE — DRAIN PLEUROVAC

## (undated) DEVICE — BLADE SCALPEL SAFETYLOCK #10

## (undated) DEVICE — SUT MONOCRYL 4-0 18" PS-2

## (undated) DEVICE — SOL NORMOSOL-R PH7.4 1000ML

## (undated) DEVICE — CONNECTOR "Y" 3/8 X 1/4 X 3/8"

## (undated) DEVICE — BEAVER BLADE MINI SHARP ALL ROUND (BLUE)

## (undated) DEVICE — VESSEL LOOP MAXI-RED  0.120" X 16"

## (undated) DEVICE — Device

## (undated) DEVICE — BLADE SURGICAL #15 CARBON

## (undated) DEVICE — BLADE SCALPEL SAFETYLOCK #15

## (undated) DEVICE — SUT VICRYL 3-0 27" CT-1

## (undated) DEVICE — CANISTER SUCTION 2000CC

## (undated) DEVICE — GLV 7 PROTEXIS (WHITE)

## (undated) DEVICE — SUT PLEDGET PRE PUNCH 4.8 X 9.5 X 1.5 MM

## (undated) DEVICE — FEEDING TUBE NG SUMP 16FR 48"

## (undated) DEVICE — DRAIN DRAINGE CHEST DRY ADL DUAL

## (undated) DEVICE — BLADE SCALPEL SAFETYLOCK #11

## (undated) DEVICE — DRAPE 1/2 SHEET 40X57"

## (undated) DEVICE — POSITIONER CARDIAC BUMP

## (undated) DEVICE — SUT SOFSILK 0 30" TIES

## (undated) DEVICE — LAP PAD 18 X 18"

## (undated) DEVICE — SUT PROLENE 7-0 24" BV175-8 MULTIPASS

## (undated) DEVICE — DRAIN RESERVOIR FOR JACKSON PRATT 100CC CARDINAL

## (undated) DEVICE — MEDTRONIC CLEARVIEW BLOWER MISTER KIT W TUBING SET

## (undated) DEVICE — SUT DOUBLE 6 WIRE STERNAL

## (undated) DEVICE — SAW BLADE SYNVASIVE OSCILLATING

## (undated) DEVICE — DRSG OPSITE 13.75 X 4"

## (undated) DEVICE — PACK CARDIAC YELLOW

## (undated) DEVICE — POSITIONER FOAM EGG CRATE ULNAR 2PCS (PINK)

## (undated) DEVICE — SUT VICRYL 1 36" CTX UNDYED

## (undated) DEVICE — STABILIZER HAND ASSISTANT ATTACHMENT W STABLESOFT 2S

## (undated) DEVICE — TISSUE STABILIZER MEDTRONIC OCTOPUS EVOLUTION

## (undated) DEVICE — SYNOVIS VASCULAR PROBE 1.5MM 15CM

## (undated) DEVICE — DRSG DERMABOND PRINEO 60CM

## (undated) DEVICE — SAW BLADE MICROAIRE OSCILLATING LG 0.9X64X33.5MM

## (undated) DEVICE — SUT PROLENE 7-0 24" BV175-6

## (undated) DEVICE — GETINGE VASOVIEW 7 ENDOSCOPIC VESSEL HARVESTING SYSTEM

## (undated) DEVICE — STOPCOCK 4-WAY (BLUE) DISCOFIX SPIN-LOCK CONNECTOR

## (undated) DEVICE — SPECIMEN CONTAINER 100ML

## (undated) DEVICE — SAW BLADE MICROAIRE STERNUM 1X34X9.4MM

## (undated) DEVICE — CHEST DRAIN OASIS DRY SUCTION WATER SEAL

## (undated) DEVICE — STAPLER SKIN VISI-STAT 35 WIDE

## (undated) DEVICE — BULLDOG SPRING CLIP LATIS/LATIS 6MM 1/2 FORCE (BLUE)

## (undated) DEVICE — SUT PROLENE 8-0 24" BV175-6

## (undated) DEVICE — SUT SILK 2-0 18" SH (POP-OFF)

## (undated) DEVICE — SUT STAINLESS STEEL 5 18" CCS

## (undated) DEVICE — SUT BOOT STANDARD (YELLOW) 5 PAIR

## (undated) DEVICE — WARMING BLANKET FULL ADULT

## (undated) DEVICE — SUT PROLENE 3-0 36" SH

## (undated) DEVICE — SUT PLEDGET SOFT LARGE 3/8" X 3/16" X 1/16" X6

## (undated) DEVICE — SUT VICRYL 0 36" CTX UNDYED

## (undated) DEVICE — DRSG TEGADERM 6"X8"

## (undated) DEVICE — GOWN TRIMAX XXL

## (undated) DEVICE — DRAIN CHANNEL 32FR ROUND HUBLESS FULL FLUTED

## (undated) DEVICE — SENSOR MYOCARDIAL TEMP 15MM

## (undated) DEVICE — GOWN SLEEVES

## (undated) DEVICE — SUMP PERICARDIAL 20FR 1/4" ADULT

## (undated) DEVICE — CONNECTOR CARDIAC 1:1 FOR HUBLESS DRAINS

## (undated) DEVICE — DRSG ACE BANDAGE 6"

## (undated) DEVICE — DRSG OPSITE 2.5 X 2"

## (undated) DEVICE — DRSG TEGADERM 4X4.75"

## (undated) DEVICE — DRSG STERISTRIPS 0.5 X 4"

## (undated) DEVICE — SOL IRR POUR NS 0.9% 500ML

## (undated) DEVICE — MEDTRONIC URCHIN EVO HEART POSITIONER & CANISTER TUBING SET

## (undated) DEVICE — PACING CABLE (BROWN) A/V TEMP SCREW DOWN 12FT

## (undated) DEVICE — NDL COUNTER FOAM AND ADHESIVE 40-70

## (undated) DEVICE — GOWN LG

## (undated) DEVICE — TOURNIQUET SET 12FR (1 RED, 1 BLUE, 1 SNARE) 7"

## (undated) DEVICE — DRSG DERMABOND PRINEO 22CM

## (undated) DEVICE — CONNECTOR STRAIGHT 3/8 X 3/8"

## (undated) DEVICE — GLV 8.5 PROTEXIS (WHITE)

## (undated) DEVICE — DRSG KLING 6"

## (undated) DEVICE — DRAPE TOWEL BLUE 17" X 24"

## (undated) DEVICE — PREP CHLORAPREP HI-LITE ORANGE 26ML

## (undated) DEVICE — GLV 6.5 PROTEXIS (WHITE)

## (undated) DEVICE — SOL IRR POUR H2O 250ML

## (undated) DEVICE — SUCTION YANKAUER BULBOUS TIP NO VENT

## (undated) DEVICE — SUT POLYSORB 2-0 30" GS-21 UNDYED

## (undated) DEVICE — VESSEL LOOP KEY SURG MAXI BLUE 2.4X1.2MM

## (undated) DEVICE — SUT PLEDGET 9MM X 4MM X 1.5MM

## (undated) DEVICE — SUT SOFSILK 0 18" TIES

## (undated) DEVICE — CONNECTOR STRAIGHT 3/8 X 1/2"

## (undated) DEVICE — DRAPE 3/4 SHEET W REINFORCEMENT 56X77"

## (undated) DEVICE — NDL COUNTER FOAM AND MAGNET 40-70

## (undated) DEVICE — NDL TAPR FR EYE 1/2 CIR 3

## (undated) DEVICE — SUT PROLENE 7-0 24" BV-1

## (undated) DEVICE — DRAPE IOBAN 33" X 23"

## (undated) DEVICE — GUIDE SUT CRVD 8 SLOT

## (undated) DEVICE — GLV 8 PROTEXIS (WHITE)

## (undated) DEVICE — SUT POLYSORB 3-0 30" V-20 UNDYED

## (undated) DEVICE — SUT SILK 5-0 60" TIES

## (undated) DEVICE — SYR LUER LOK 20CC

## (undated) DEVICE — SUT PROLENE 4-0 30" SH-1

## (undated) DEVICE — GOWN TRIMAX LG

## (undated) DEVICE — GLV 7.5 PROTEXIS (WHITE)

## (undated) DEVICE — SUT PROLENE 6-0 30" C-1

## (undated) DEVICE — DRAPE MAYO STAND 30"

## (undated) DEVICE — AORTIC PUNCH 4.0MM LONG LENGTH HANDLE

## (undated) DEVICE — SYS VEIN HARVESTING VIRTUOSAPH PLUS W/ RADIAL

## (undated) DEVICE — SUT TICRON 2-0 30" CV-305 DA

## (undated) DEVICE — SUT ETHIBOND 1 30" CT-1

## (undated) DEVICE — VESSEL LOOP ASPEN SUPERMAXI BLUE

## (undated) DEVICE — SUT MONOCRYL 3-0 18" PS-2 UNDYED

## (undated) DEVICE — FOLEY TRAY 16FR 5CC LF LUBRISIL ADVANCE TEMP CLOSED